# Patient Record
Sex: FEMALE | Race: ASIAN | Employment: UNEMPLOYED | URBAN - METROPOLITAN AREA
[De-identification: names, ages, dates, MRNs, and addresses within clinical notes are randomized per-mention and may not be internally consistent; named-entity substitution may affect disease eponyms.]

---

## 2024-07-29 ENCOUNTER — HOSPITAL ENCOUNTER (INPATIENT)
Facility: HOSPITAL | Age: 54
LOS: 15 days | Discharge: HOME | End: 2024-08-13
Attending: STUDENT IN AN ORGANIZED HEALTH CARE EDUCATION/TRAINING PROGRAM | Admitting: HOSPITALIST
Payer: MEDICAID

## 2024-07-29 ENCOUNTER — APPOINTMENT (OUTPATIENT)
Dept: GENERAL RADIOLOGY | Facility: HOSPITAL | Age: 54
End: 2024-07-29
Payer: MEDICAID

## 2024-07-29 DIAGNOSIS — I50.9 ACUTE CONGESTIVE HEART FAILURE, UNSPECIFIED HEART FAILURE TYPE (HCC): ICD-10-CM

## 2024-07-29 DIAGNOSIS — E05.91 THYROTOXICOSIS WITH THYROTOXIC CRISIS, UNSPECIFIED THYROTOXICOSIS TYPE: ICD-10-CM

## 2024-07-29 DIAGNOSIS — I42.9 CARDIOMYOPATHY, UNSPECIFIED TYPE (HCC): ICD-10-CM

## 2024-07-29 DIAGNOSIS — I48.91 ATRIAL FIBRILLATION WITH RAPID VENTRICULAR RESPONSE (HCC): Primary | ICD-10-CM

## 2024-07-29 DIAGNOSIS — D64.9 ANEMIA, UNSPECIFIED TYPE: ICD-10-CM

## 2024-07-29 LAB
ALBUMIN SERPL-MCNC: 3.2 G/DL (ref 3.2–4.8)
ALP LIVER SERPL-CCNC: 110 U/L
ALT SERPL-CCNC: <7 U/L
ANION GAP SERPL CALC-SCNC: 5 MMOL/L (ref 0–18)
APTT PPP: 33 SECONDS (ref 23–36)
AST SERPL-CCNC: 23 U/L (ref ?–34)
BASOPHILS # BLD AUTO: 0.05 X10(3) UL (ref 0–0.2)
BASOPHILS NFR BLD AUTO: 1 %
BILIRUB DIRECT SERPL-MCNC: 0.5 MG/DL (ref ?–0.3)
BILIRUB SERPL-MCNC: 0.9 MG/DL (ref 0.3–1.2)
BNP SERPL-MCNC: 426 PG/ML
BUN BLD-MCNC: 14 MG/DL (ref 9–23)
BUN/CREAT SERPL: 22.6 (ref 10–20)
CALCIUM BLD-MCNC: 8.5 MG/DL (ref 8.7–10.4)
CHLORIDE SERPL-SCNC: 110 MMOL/L (ref 98–112)
CO2 SERPL-SCNC: 26 MMOL/L (ref 21–32)
CORTIS SERPL-MCNC: 18.9 UG/DL
CREAT BLD-MCNC: 0.62 MG/DL
DEPRECATED RDW RBC AUTO: 51.9 FL (ref 35.1–46.3)
EGFRCR SERPLBLD CKD-EPI 2021: 105 ML/MIN/1.73M2 (ref 60–?)
EOSINOPHIL # BLD AUTO: 0.06 X10(3) UL (ref 0–0.7)
EOSINOPHIL NFR BLD AUTO: 1.2 %
ERYTHROCYTE [DISTWIDTH] IN BLOOD BY AUTOMATED COUNT: 19.5 % (ref 11–15)
GLUCOSE BLD-MCNC: 88 MG/DL (ref 70–99)
HCG SERPL QL: NEGATIVE
HCT VFR BLD AUTO: 25 %
HGB BLD-MCNC: 7.1 G/DL
IMM GRANULOCYTES # BLD AUTO: 0.01 X10(3) UL (ref 0–1)
IMM GRANULOCYTES NFR BLD: 0.2 %
INR BLD: 1.4 (ref 0.8–1.2)
IRON SATN MFR SERPL: 6 %
IRON SERPL-MCNC: 22 UG/DL
LYMPHOCYTES # BLD AUTO: 1.35 X10(3) UL (ref 1–4)
LYMPHOCYTES NFR BLD AUTO: 26.8 %
MAGNESIUM SERPL-MCNC: 1.8 MG/DL (ref 1.6–2.6)
MCH RBC QN AUTO: 20.9 PG (ref 26–34)
MCHC RBC AUTO-ENTMCNC: 28.4 G/DL (ref 31–37)
MCV RBC AUTO: 73.7 FL
MONOCYTES # BLD AUTO: 0.43 X10(3) UL (ref 0.1–1)
MONOCYTES NFR BLD AUTO: 8.5 %
NEUTROPHILS # BLD AUTO: 3.14 X10 (3) UL (ref 1.5–7.7)
NEUTROPHILS # BLD AUTO: 3.14 X10(3) UL (ref 1.5–7.7)
NEUTROPHILS NFR BLD AUTO: 62.3 %
OSMOLALITY SERPL CALC.SUM OF ELEC: 292 MOSM/KG (ref 275–295)
PLATELET # BLD AUTO: 264 10(3)UL (ref 150–450)
POTASSIUM SERPL-SCNC: 3.8 MMOL/L (ref 3.5–5.1)
PROT SERPL-MCNC: 8.2 G/DL (ref 5.7–8.2)
PROTHROMBIN TIME: 18 SECONDS (ref 11.6–14.8)
RBC # BLD AUTO: 3.39 X10(6)UL
SODIUM SERPL-SCNC: 141 MMOL/L (ref 136–145)
T3FREE SERPL-MCNC: 6.24 PG/ML (ref 2.4–4.2)
T4 FREE SERPL-MCNC: 3.1 NG/DL (ref 0.8–1.7)
TIBC SERPL-MCNC: 349 UG/DL (ref 250–425)
TRANSFERRIN SERPL-MCNC: 234 MG/DL (ref 250–380)
TROPONIN I SERPL HS-MCNC: 33 NG/L
TSI SER-ACNC: <0.008 MIU/ML (ref 0.55–4.78)
WBC # BLD AUTO: 5 X10(3) UL (ref 4–11)

## 2024-07-29 PROCEDURE — 99223 1ST HOSP IP/OBS HIGH 75: CPT | Performed by: HOSPITALIST

## 2024-07-29 PROCEDURE — 71045 X-RAY EXAM CHEST 1 VIEW: CPT | Performed by: STUDENT IN AN ORGANIZED HEALTH CARE EDUCATION/TRAINING PROGRAM

## 2024-07-29 PROCEDURE — 3E0333Z INTRODUCTION OF ANTI-INFLAMMATORY INTO PERIPHERAL VEIN, PERCUTANEOUS APPROACH: ICD-10-PCS | Performed by: STUDENT IN AN ORGANIZED HEALTH CARE EDUCATION/TRAINING PROGRAM

## 2024-07-29 RX ORDER — PROPYLTHIOURACIL 50 MG/1
50 TABLET ORAL ONCE
Status: COMPLETED | OUTPATIENT
Start: 2024-07-29 | End: 2024-07-29

## 2024-07-29 RX ORDER — PROCHLORPERAZINE EDISYLATE 5 MG/ML
5 INJECTION INTRAMUSCULAR; INTRAVENOUS EVERY 8 HOURS PRN
Status: DISCONTINUED | OUTPATIENT
Start: 2024-07-29 | End: 2024-08-13

## 2024-07-29 RX ORDER — HEPARIN SODIUM AND DEXTROSE 10000; 5 [USP'U]/100ML; G/100ML
INJECTION INTRAVENOUS CONTINUOUS
Status: DISCONTINUED | OUTPATIENT
Start: 2024-07-30 | End: 2024-08-05

## 2024-07-29 RX ORDER — HEPARIN SODIUM 1000 [USP'U]/ML
5000 INJECTION, SOLUTION INTRAVENOUS; SUBCUTANEOUS ONCE
Status: COMPLETED | OUTPATIENT
Start: 2024-07-29 | End: 2024-07-29

## 2024-07-29 RX ORDER — DEXAMETHASONE SODIUM PHOSPHATE 4 MG/ML
4 VIAL (ML) INJECTION ONCE
Status: COMPLETED | OUTPATIENT
Start: 2024-07-29 | End: 2024-07-29

## 2024-07-29 RX ORDER — ACETAMINOPHEN 500 MG
500 TABLET ORAL EVERY 4 HOURS PRN
Status: DISCONTINUED | OUTPATIENT
Start: 2024-07-29 | End: 2024-08-13

## 2024-07-29 RX ORDER — HEPARIN SODIUM AND DEXTROSE 10000; 5 [USP'U]/100ML; G/100ML
1000 INJECTION INTRAVENOUS ONCE
Status: COMPLETED | OUTPATIENT
Start: 2024-07-29 | End: 2024-07-30

## 2024-07-29 RX ORDER — FUROSEMIDE 10 MG/ML
40 INJECTION INTRAMUSCULAR; INTRAVENOUS
Status: DISCONTINUED | OUTPATIENT
Start: 2024-07-29 | End: 2024-08-01

## 2024-07-29 RX ORDER — ONDANSETRON 2 MG/ML
4 INJECTION INTRAMUSCULAR; INTRAVENOUS EVERY 6 HOURS PRN
Status: DISCONTINUED | OUTPATIENT
Start: 2024-07-29 | End: 2024-08-13

## 2024-07-29 RX ORDER — FUROSEMIDE 10 MG/ML
40 INJECTION INTRAMUSCULAR; INTRAVENOUS ONCE
Status: COMPLETED | OUTPATIENT
Start: 2024-07-29 | End: 2024-07-29

## 2024-07-29 RX ORDER — TEMAZEPAM 15 MG/1
15 CAPSULE ORAL NIGHTLY PRN
Status: DISCONTINUED | OUTPATIENT
Start: 2024-07-29 | End: 2024-08-13

## 2024-07-29 NOTE — ED INITIAL ASSESSMENT (HPI)
Shubham arrives via [facility] from  for new onset afib. Patient went there today due to 60-80lb weight gain in the last month. She states her legs are weeping as well.   Denies any medical hx, denies taking any medications.

## 2024-07-30 ENCOUNTER — APPOINTMENT (OUTPATIENT)
Dept: CV DIAGNOSTICS | Facility: HOSPITAL | Age: 54
End: 2024-07-30
Attending: HOSPITALIST
Payer: MEDICAID

## 2024-07-30 LAB
ANION GAP SERPL CALC-SCNC: 6 MMOL/L (ref 0–18)
APTT PPP: 198.8 SECONDS (ref 23–36)
APTT PPP: 46.7 SECONDS (ref 23–36)
APTT PPP: 54.1 SECONDS (ref 23–36)
BASOPHILS # BLD AUTO: 0.02 X10(3) UL (ref 0–0.2)
BASOPHILS NFR BLD AUTO: 0.7 %
BUN BLD-MCNC: 13 MG/DL (ref 9–23)
BUN/CREAT SERPL: 18.8 (ref 10–20)
CALCIUM BLD-MCNC: 8.2 MG/DL (ref 8.7–10.4)
CHLORIDE SERPL-SCNC: 110 MMOL/L (ref 98–112)
CO2 SERPL-SCNC: 23 MMOL/L (ref 21–32)
CREAT BLD-MCNC: 0.69 MG/DL
DEPRECATED RDW RBC AUTO: 53.1 FL (ref 35.1–46.3)
EGFRCR SERPLBLD CKD-EPI 2021: 102 ML/MIN/1.73M2 (ref 60–?)
EOSINOPHIL # BLD AUTO: 0 X10(3) UL (ref 0–0.7)
EOSINOPHIL NFR BLD AUTO: 0 %
ERYTHROCYTE [DISTWIDTH] IN BLOOD BY AUTOMATED COUNT: 19.6 % (ref 11–15)
GLUCOSE BLD-MCNC: 130 MG/DL (ref 70–99)
HCT VFR BLD AUTO: 25.1 %
HGB BLD-MCNC: 7.1 G/DL
IMM GRANULOCYTES # BLD AUTO: 0.01 X10(3) UL (ref 0–1)
IMM GRANULOCYTES NFR BLD: 0.3 %
IRON SATN MFR SERPL: 8 %
IRON SERPL-MCNC: 23 UG/DL
LYMPHOCYTES # BLD AUTO: 0.82 X10(3) UL (ref 1–4)
LYMPHOCYTES NFR BLD AUTO: 27.9 %
MAGNESIUM SERPL-MCNC: 1.7 MG/DL (ref 1.6–2.6)
MCH RBC QN AUTO: 21.1 PG (ref 26–34)
MCHC RBC AUTO-ENTMCNC: 28.3 G/DL (ref 31–37)
MCV RBC AUTO: 74.7 FL
MONOCYTES # BLD AUTO: 0.1 X10(3) UL (ref 0.1–1)
MONOCYTES NFR BLD AUTO: 3.4 %
NEUTROPHILS # BLD AUTO: 1.99 X10 (3) UL (ref 1.5–7.7)
NEUTROPHILS # BLD AUTO: 1.99 X10(3) UL (ref 1.5–7.7)
NEUTROPHILS NFR BLD AUTO: 67.7 %
OSMOLALITY SERPL CALC.SUM OF ELEC: 290 MOSM/KG (ref 275–295)
PLATELET # BLD AUTO: 261 10(3)UL (ref 150–450)
PLATELET MORPHOLOGY: NORMAL
POTASSIUM SERPL-SCNC: 4.2 MMOL/L (ref 3.5–5.1)
Q-T INTERVAL: 262 MS
QRS DURATION: 64 MS
QTC CALCULATION (BEZET): 398 MS
R AXIS: 88 DEGREES
RBC # BLD AUTO: 3.36 X10(6)UL
SODIUM SERPL-SCNC: 139 MMOL/L (ref 136–145)
T AXIS: -4 DEGREES
T4 FREE SERPL-MCNC: 2.5 NG/DL (ref 0.8–1.7)
TIBC SERPL-MCNC: 302 UG/DL (ref 250–425)
TRANSFERRIN SERPL-MCNC: 203 MG/DL (ref 250–380)
VENTRICULAR RATE: 139 BPM
WBC # BLD AUTO: 2.9 X10(3) UL (ref 4–11)

## 2024-07-30 PROCEDURE — 93306 TTE W/DOPPLER COMPLETE: CPT | Performed by: HOSPITALIST

## 2024-07-30 PROCEDURE — 99223 1ST HOSP IP/OBS HIGH 75: CPT | Performed by: INTERNAL MEDICINE

## 2024-07-30 PROCEDURE — 99233 SBSQ HOSP IP/OBS HIGH 50: CPT | Performed by: HOSPITALIST

## 2024-07-30 RX ORDER — PROPRANOLOL HYDROCHLORIDE 20 MG/1
20 TABLET ORAL 3 TIMES DAILY
Status: DISCONTINUED | OUTPATIENT
Start: 2024-07-30 | End: 2024-08-07

## 2024-07-30 RX ORDER — METHIMAZOLE 5 MG/1
20 TABLET ORAL 2 TIMES DAILY
Status: DISCONTINUED | OUTPATIENT
Start: 2024-07-30 | End: 2024-08-01

## 2024-07-30 RX ORDER — MAGNESIUM OXIDE 400 MG/1
400 TABLET ORAL ONCE
Status: COMPLETED | OUTPATIENT
Start: 2024-07-30 | End: 2024-07-30

## 2024-07-30 NOTE — CONSULTS
[facility]  part of Located within [facility]    Report of Consultation    Shubham Zurita Patient Status:  Inpatient    1970 MRN [id number]   Location [facility] 3W/SW Attending Ana Alcantara MD   Hosp Day # 1 PCP No primary care provider on file.     Date of Admission:  2024   DOS is the same date the note was signed   Consulted by Ana Alcantara MD  Reason for Consultation:    Hyperthyroid    History of Present Illness:   Patient is a 54 year old female who was admitted to the hospital for Atrial fibrillation with rapid ventricular response (HCC): Atrial fibrillation with rapid ventricular response, hyperthyroidism and heart failure.      Pt noticed eye changes yrs ago but got worse recently   She was told she might have thyroid dz   1 sister with hyperthyroid   1 sister with hypothyroid     No tremors or palpitations   Has leg swelling and wt gain   No compressive sx       Past Medical History  History reviewed. No pertinent past medical history.  Denied PMH     Past Surgical History  History reviewed. No pertinent surgical history.  CS  Family History  No family history on file.  1 sister with hyperthyroid   1 sister with hypothyroid   Social History  Social History     Socioeconomic History    Marital status:    Tobacco Use    Smoking status: Never    Smokeless tobacco: Never   Vaping Use    Vaping status: Never Used   Substance and Sexual Activity    Alcohol use: Never    Drug use: Never     Social Determinants of Health     Food Insecurity: No Food Insecurity (2024)    Food Insecurity     Food Insecurity: Never true   Transportation Needs: No Transportation Needs (2024)    Transportation Needs     Lack of Transportation: No   Housing Stability: Low Risk  (2024)    Housing Stability     Housing Instability: No           Current Medications:  Current Facility-Administered Medications   Medication Dose Route Frequency    methIMAzole (Tapazole) tab 20 mg  20 mg  Oral BID    heparin (Porcine) 91981 units/250mL infusion ACS/AFIB CONTINUOUS  200-3,000 Units/hr Intravenous Continuous    dilTIAZem 10 mg BOLUS FROM BAG infusion  10 mg Intravenous Q1H PRN    dilTIAZem (cardIZEM) 100 mg in sodium chloride 0.9% 100 mL IVPB-ADDV  2.5-20 mg/hr Intravenous Continuous    dilTIAZem (cardIZEM) tab 30 mg  30 mg Oral 4 times per day    furosemide (Lasix) 10 mg/mL injection 40 mg  40 mg Intravenous BID (Diuretic)    acetaminophen (Tylenol Extra Strength) tab 500 mg  500 mg Oral Q4H PRN    ondansetron (Zofran) 4 MG/2ML injection 4 mg  4 mg Intravenous Q6H PRN    prochlorperazine (Compazine) 10 MG/2ML injection 5 mg  5 mg Intravenous Q8H PRN    temazepam (Restoril) cap 15 mg  15 mg Oral Nightly PRN     No medications prior to admission.       Allergies  No Known Allergies                Review of Systems:   All other systems are negative other than HPI    Physical Exam:   Vital Signs:  Blood pressure 122/71, pulse 112, temperature 98.2 °F (36.8 °C), temperature source Oral, resp. rate 19, height 5' 4\" (1.626 m), weight 244 lb 1.6 oz (110.7 kg), SpO2 96%.   Graves orbitopathy   No goiter   LE edema     General: Awake and alert.    HENT: Eye: EOMI, normal lids, no discharge,     Neck: full range of motion  Neck/Thyroid: neck inspection: normal, No scar, No goiter   Lungs: No acute respiratory distress, non-labored respiration. Speaking full sentences  Abdomen:  Obese, nontender  MSK: Moves extremities spontaneously. full range of motion in all major joints  Neuro:speech is clear. Awake, alert, no aphasia, no facial asymmetry,   Psych: Orientated to time, place, person & situation,   Skin: Skin is dry, no obvious rashes or lesions      Results:     Laboratory Data:  Lab Results   Component Value Date    WBC 2.9 (L) 07/30/2024    HGB 7.1 (L) 07/30/2024    HCT 25.1 (L) 07/30/2024    .0 07/30/2024    CREATSERUM 0.69 07/30/2024    BUN 13 07/30/2024     07/30/2024    K 4.2 07/30/2024      07/30/2024    CO2 23.0 07/30/2024     (H) 07/30/2024    CA 8.2 (L) 07/30/2024    ALB 3.2 07/29/2024    ALKPHO 110 (H) 07/29/2024    TP 8.2 07/29/2024    AST 23 07/29/2024    ALT <7 (L) 07/29/2024    PTT 46.7 (H) 07/30/2024    INR 1.40 (H) 07/29/2024    PTP 18.0 (H) 07/29/2024    T4F 3.1 (H) 07/29/2024    TSH <0.008 (L) 07/29/2024    MG 1.7 07/30/2024         Imaging:  XR CHEST AP PORTABLE  (CPT=71045)    Result Date: 7/29/2024  CONCLUSION: Cardiomegaly.  Small left pleural effusion.    Dictated by (CST): Saúl Belle MD on 7/29/2024 at 5:54 PM     Finalized by (CST): Saúl Belle MD on 7/29/2024 at 5:56 PM              Impression:     Patient Active Problem List   Diagnosis    Atrial fibrillation with rapid ventricular response (HCC)    Thyrotoxicosis with thyrotoxic crisis, unspecified thyrotoxicosis type    Acute congestive heart failure, unspecified heart failure type (HCC)    Cardiomyopathy, unspecified type (HCC)    Anemia, unspecified type    Acute heart failure (HCC)       Severe hyperthyroid with A fib and RVR  Newly dx hyperthyroid with complications. Graves orbitopathy. Most likely graves. No goiter on exam. Strong family hx of thyroid autoimmune disease   D/w   ER Dr. ANC and LFTs are normal. Neg HCG. FT4 high with suppressed TSH  . Gave PTU and dexamethasone in the ER     Pending labs today   Pending TSI and TRAb   Will start methimazole 20 mg bid first dose now   Will follow       Thank you for allowing me to participate in the care of your patient.  D/w pt and  Rn   Scott Black MD  7/30/2024

## 2024-07-30 NOTE — DIETARY NOTE
NUTRITION EDUCATION NOTE     Received consult for heart failure nutrition education. Verbally reviewed basic cardiac diet restrictions. Provided with Heart Failure Nutrition Therapy NCM handout to reinforce. Receptive to instruction. Would benefit from outpt f/u. Expect fair to good compliance. RD contact information provided to ptAlvaro Shepard MS, BING, RDN, LDN  Clinical Dietitian  P: [phone]

## 2024-07-30 NOTE — PLAN OF CARE
No complaints overnight. Heparin and Cardizem drip running.   Problem: Patient Centered Care  Goal: Patient preferences are identified and integrated in the patient's plan of care  Description: Interventions:  - What would you like us to know as we care for you? From home with   - Provide timely, complete, and accurate information to patient/family  - Incorporate patient and family knowledge, values, beliefs, and cultural backgrounds into the planning and delivery of care  - Encourage patient/family to participate in care and decision-making at the level they choose  - Honor patient and family perspectives and choices  Outcome: Progressing     Problem: Patient/Family Goals  Goal: Patient/Family Long Term Goal  Description: Patient's Long Term Goal: go home    Interventions:  - follow md orders  - See additional Care Plan goals for specific interventions  Outcome: Progressing  Goal: Patient/Family Short Term Goal  Description: Patient's Short Term Goal: remain safe in hospital    Interventions:   - monitor on tele  - heparin gtt  - See additional Care Plan goals for specific interventions  Outcome: Progressing     Problem: CARDIOVASCULAR - ADULT  Goal: Maintains optimal cardiac output and hemodynamic stability  Description: INTERVENTIONS:  - Monitor vital signs, rhythm, and trends  - Monitor for bleeding, hypotension and signs of decreased cardiac output  - Evaluate effectiveness of vasoactive medications to optimize hemodynamic stability  - Monitor arterial and/or venous puncture sites for bleeding and/or hematoma  - Assess quality of pulses, skin color and temperature  - Assess for signs of decreased coronary artery perfusion - ex. Angina  - Evaluate fluid balance, assess for edema, trend weights  Outcome: Progressing  Goal: Absence of cardiac arrhythmias or at baseline  Description: INTERVENTIONS:  - Continuous cardiac monitoring, monitor vital signs, obtain 12 lead EKG if indicated  - Evaluate  effectiveness of antiarrhythmic and heart rate control medications as ordered  - Initiate emergency measures for life threatening arrhythmias  - Monitor electrolytes and administer replacement therapy as ordered  Outcome: Progressing

## 2024-07-30 NOTE — PLAN OF CARE
Patient alert and oriented. Ambulating with standby assist in room. Heparin gtt and cardizem gtt infusing. IV lasix continued. Started on PO thyroid medication for possible Grave's Disease. Resting in bed with fall precautions in place and call light in reach. Plan to discharge home when medically clear.     Problem: Patient Centered Care  Goal: Patient preferences are identified and integrated in the patient's plan of care  Description: Interventions:  - What would you like us to know as we care for you? From home with   - Provide timely, complete, and accurate information to patient/family  - Incorporate patient and family knowledge, values, beliefs, and cultural backgrounds into the planning and delivery of care  - Encourage patient/family to participate in care and decision-making at the level they choose  - Honor patient and family perspectives and choices  Outcome: Progressing     Problem: Patient/Family Goals  Goal: Patient/Family Long Term Goal  Description: Patient's Long Term Goal: go home    Interventions:  - follow md orders  - See additional Care Plan goals for specific interventions  Outcome: Progressing  Goal: Patient/Family Short Term Goal  Description: Patient's Short Term Goal: remain safe in hospital    Interventions:   - monitor on tele  - heparin gtt  - See additional Care Plan goals for specific interventions  Outcome: Progressing     Problem: CARDIOVASCULAR - ADULT  Goal: Maintains optimal cardiac output and hemodynamic stability  Description: INTERVENTIONS:  - Monitor vital signs, rhythm, and trends  - Monitor for bleeding, hypotension and signs of decreased cardiac output  - Evaluate effectiveness of vasoactive medications to optimize hemodynamic stability  - Monitor arterial and/or venous puncture sites for bleeding and/or hematoma  - Assess quality of pulses, skin color and temperature  - Assess for signs of decreased coronary artery perfusion - ex. Angina  - Evaluate fluid balance,  assess for edema, trend weights  Outcome: Progressing  Goal: Absence of cardiac arrhythmias or at baseline  Description: INTERVENTIONS:  - Continuous cardiac monitoring, monitor vital signs, obtain 12 lead EKG if indicated  - Evaluate effectiveness of antiarrhythmic and heart rate control medications as ordered  - Initiate emergency measures for life threatening arrhythmias  - Monitor electrolytes and administer replacement therapy as ordered  Outcome: Progressing

## 2024-07-30 NOTE — PAYOR COMM NOTE
--------------  ADMISSION REVIEW     Payor: [facility]  Subscriber #:  [id number]  Authorization Number: [id number]    Admit date: 7/29/24  Admit time:  9:42 PM       REVIEW DOCUMENTATION:     ED Provider Notes        ED Provider Notes signed by Victor M Nina MD at 7/29/2024  7:56 PM       Author: Victor M Nina MD Service: -- Author Type: Physician    Filed: 7/29/2024  7:56 PM Date of Service: 7/29/2024  5:42 PM Status: Signed    : Victor M Nina MD (Physician)             History     Chief Complaint   Patient presents with    Weight Gain    Arrythmia/Palpitations       HPI    54 year old female brought in by EMS from urgent care.  She states for about a month now she has had fatigue, exertional dyspnea, about 60 pounds weight gain, bilateral lower extremity swelling.  She does not take any medicines that does not routinely go to the doctor.  It was noted at urgent care patient was in A-fib with RVR and EMS was called.          History reviewed. No pertinent past medical history.    History reviewed. No pertinent surgical history.    Social History     Socioeconomic History    Marital status:    Tobacco Use    Smoking status: Never    Smokeless tobacco: Never   Vaping Use    Vaping status: Never Used   Substance and Sexual Activity    Alcohol use: Never    Drug use: Never                   Physical Exam     ED Triage Vitals [07/29/24 1731]   /78   Pulse (!) 155   Resp (!) 32   Temp 98.1 °F (36.7 °C)   Temp src Temporal   SpO2 100 %   O2 Device None (Room air)       Physical Exam  Constitutional:       Appearance: She is not ill-appearing.   Eyes:      Extraocular Movements: Extraocular movements intact.   Neck:      Comments: Jugular venous distention+  Cardiovascular:      Rate and Rhythm: Tachycardia present. Rhythm irregular.      Pulses: Normal pulses.   Pulmonary:      Effort: Pulmonary effort is normal. No respiratory distress.   Abdominal:      General: Abdomen  is flat. There is distension.      Tenderness: There is no abdominal tenderness.   Musculoskeletal:      Cervical back: Normal range of motion.      Comments: Bilateral lower extremity edema, venous stasis skin changes pulses are intact   Skin:     General: Skin is warm.   Neurological:      General: No focal deficit present.      Mental Status: She is alert.              ED Course     Labs Reviewed   BASIC METABOLIC PANEL (8) - Abnormal; Notable for the following components:       Result Value    BUN/CREA Ratio 22.6 (*)     Calcium, Total 8.5 (*)     All other components within normal limits   BNP (B TYPE NATRIURETIC PEPTIDE) - Abnormal; Notable for the following components:    Beta Natriuretic Peptide 426 (*)     All other components within normal limits   PROTHROMBIN TIME (PT) - Abnormal; Notable for the following components:    PT 18.0 (*)     INR 1.40 (*)     All other components within normal limits   HEPATIC FUNCTION PANEL (7) - Abnormal; Notable for the following components:    ALT <7 (*)     Alkaline Phosphatase 110 (*)     Bilirubin, Direct 0.5 (*)     All other components within normal limits   TSH W REFLEX TO FREE T4 - Abnormal; Notable for the following components:    TSH <0.008 (*)     All other components within normal limits   T4, FREE (S) - Abnormal; Notable for the following components:    Free T4 3.1 (*)     All other components within normal limits   FREE T3 (TRIIODOTHYRONINE) - Abnormal; Notable for the following components:    T3 Free 6.24 (*)     All other components within normal limits   CBC W/ DIFFERENTIAL - Abnormal; Notable for the following components:    RBC 3.39 (*)     HGB 7.1 (*)     HCT 25.0 (*)     MCV 73.7 (*)     MCH 20.9 (*)     MCHC 28.4 (*)     RDW-SD 51.9 (*)     RDW 19.5 (*)     All other components within normal limits   TROPONIN I HIGH SENSITIVITY - Normal   PTT, ACTIVATED - Normal   MAGNESIUM - Normal   HCG, BETA SUBUNIT, QUAL - Normal   CBC WITH DIFFERENTIAL WITH  PLATELET    Narrative:     The following orders were created for panel order CBC With Differential With Platelet.  Procedure                               Abnormality         Status                     ---------                               -----------         ------                     CBC W/ DIFFERENTIAL[[id number]]          Abnormal            Final result                 Please view results for these tests on the individual orders.   SCAN SLIDE   CORTISOL   RAINBOW DRAW LAVENDER   RAINBOW DRAW LIGHT GREEN   RAINBOW DRAW BLUE   RAINBOW DRAW GOLD     XR CHEST AP PORTABLE  (CPT=71045)    Result Date: 7/29/2024  CONCLUSION: Cardiomegaly.  Small left pleural effusion.    Dictated by (CST): Saúl Belle MD on 7/29/2024 at 5:54 PM     Finalized by (CST): Saúl Belle MD on 7/29/2024 at 5:56 PM               MDM     Vitals:    07/29/24 1845 07/29/24 1900 07/29/24 1915 07/29/24 1945   BP: 114/67 128/81 115/70 130/78   Pulse: 106 107 108 106   Resp: 25 19 19 15   Temp:       TempSrc:       SpO2: 100% 100% 100% 100%   Weight:       Height:           Patient's clinical picture is concerning for heart failure.  She appears fluid overloaded.  She is in atrial fibrillation with rapid ventricular response.  Initial blood pressure is reassuring.  She is mentating and perfusing appropriately.  Will plan to start patient on diltiazem bolus and drip, check for electrolyte abnormalities, ACS, thyroid dysf    ED Course as of 07/29/24 1956  ------------------------------------------------------------  Time: 07/29 1804  Comment: My interpretation of chest x-ray with significant cardiomegaly.  Radiologist noting small left pleural effusion.  ------------------------------------------------------------  Time: 07/29 1813  Value: Hemoglobin(!): 7.1  Comment: States has normal colored stools.  ------------------------------------------------------------  Time: 07/29 1817  Comment: Rate is responding well to Cardizem.  Blood pressure  remained stable.  She is mentating and perfusing appropriately.  Patient will be started on heparin drip, case endorsed to cardiologist.  ------------------------------------------------------------  Time: 07/29 1829  Comment: Undetectable TSH concerning for thyroid storm given her clinical picture.  ------------------------------------------------------------  Time: 07/29 1855  Comment: I consulted with endocrinology, will start patient on PTU 50 mg every 6 and dexamethasone 4 mg twice daily.  ------------------------------------------------------------  Time: 07/29 1943  Comment: Vitals remain stable.     Critical Care Note  44 minutes of my time was spent engaged in work directly related to patient care, exclusive of other procedures, to prevent further deterioration of patient's condition.  Addressed impending deterioration including: airway, respiratory, cardiovascular, metabolic  Interpretation of CXR, cardiac output, EKG, BP  Response to treatments and reassessment of patient  Speaking with consultants  Performed by self      Disposition and Plan     Clinical Impression:  1. Atrial fibrillation with rapid ventricular response (HCC)    2. Thyrotoxicosis with thyrotoxic crisis, unspecified thyrotoxicosis type    3. Acute congestive heart failure, unspecified heart failure type (HCC)    4. Cardiomyopathy, unspecified type (HCC)    5. Anemia, unspecified type        Disposition:  Admit    Follow-up:  No follow-up provider specified.    Medications Prescribed:  There are no discharge medications for this patient.      Hospital Problems       Present on Admission             ICD-10-CM Noted POA    * (Principal) Atrial fibrillation with rapid ventricular response (HCC) I48.91 7/29/2024 Unknown            Signed by Victor M Nina MD on 7/29/2024  7:56 PM         MEDICATIONS ADMINISTERED IN LAST 1 DAY:  heparin (Porcine) 1000 UNIT/ML injection - BOLUS IV 5,000 Units       Date Action Dose Route User    7/29/2024 2727  Given 5,000 Units Intravenous Ariane Hernández RN          heparin (Porcine) 26028 units/250mL infusion ACS/AFIB CONTINUOUS       Date Action Dose Route User    7/30/2024 0829 Hi-Risk Rate/Dose Change 900 Units/hr Intravenous Gina Tristan RN    7/30/2024 0207 Hi-Risk Rate/Dose Change 700 Units/hr Intravenous Amina Gallo RN          dexamethasone (Decadron) 4 MG/ML injection 4 mg       Date Action Dose Route User    7/29/2024 2050 Given 4 mg Intravenous Jeremias Sanchez RN          dilTIAZem (cardIZEM) 100 mg in sodium chloride 0.9% 100 mL IVPB-ADDV       Date Action Dose Route User    7/29/2024 2000 Rate/Dose Change 15 mg/hr Intravenous Jeremias Sanchez RN    7/29/2024 1807 Rate/Dose Change 15 mg/hr Intravenous Ariane Hernández RN    7/29/2024 1748 New Bag 10 mg/hr Intravenous Ariane Hernández RN          dilTIAZem (cardIZEM) 100 mg in sodium chloride 0.9% 100 mL IVPB-ADDV       Date Action Dose Route User    7/30/2024 1128 New Bag 5 mg/hr Intravenous Gina Tristan RN    7/30/2024 0159 Rate/Dose Change 5 mg/hr Intravenous Amina Gallo RN    7/30/2024 0008 New Bag 10 mg/hr Intravenous Amina Gallo RN    7/29/2024 2210 Started Other 15 mg/hr Intravenous Amina Gallo RN          dilTIAZem 10 mg BOLUS FROM BAG infusion       Date Action Dose Route User    7/29/2024 1748 Bolus from Bag 10 mg Intravenous Ariane Hernández RN          dilTIAZem (cardIZEM) tab 30 mg       Date Action Dose Route User    7/30/2024 1200 Given 30 mg Oral Gina Tristan RN    7/30/2024 0610 Given 30 mg Oral Amina Gallo RN    7/30/2024 0010 Given 30 mg Oral Amina Gallo RN          heparin (Porcine) 93276 units/250mL infusion ED INITIAL DOSE       Date Action Dose Route User    7/29/2024 1844 New Bag 1,000 Units/hr Intravenous Hernández, Ariane, RN          furosemide (Lasix) 10 mg/mL injection 40 mg       Date Action Dose Route User    7/29/2024 1841 Given 40 mg Intravenous Ariane Hernández, RN          furosemide  (Lasix) 10 mg/mL injection 40 mg       Date Action Dose Route User    7/30/2024 0824 Given 40 mg Intravenous Gina Tristan RN          magnesium oxide (Mag-Ox) tab 400 mg       Date Action Dose Route User    7/30/2024 0824 Given 400 mg Oral Gina Tristan RN          methIMAzole (Tapazole) tab 20 mg       Date Action Dose Route User    7/30/2024 0824 Given 20 mg Oral Gina Tristan RN          propylthiouracil (PTU) tab 50 mg       Date Action Dose Route User    7/29/2024 2216 Given 50 mg Oral Amina Gallo RN            Vitals (last day)       Date/Time Temp Pulse Resp BP SpO2 Weight O2 Device O2 Flow Rate (L/min) Who    07/30/24 1125 98 °F (36.7 °C) 99 18 103/58 96 % -- None (Room air) -- CS    07/30/24 0813 98.2 °F (36.8 °C) 112 19 122/71 96 % -- None (Room air) -- CS    07/30/24 0606 97.7 °F (36.5 °C) 105 20 106/64 96 % -- None (Room air) -- PG    07/30/24 0007 -- 99 -- 110/60 -- 244 lb 1.6 oz (110.7 kg) -- -- PG    07/29/24 2158 98 °F (36.7 °C) 109 20 111/65 98 % -- None (Room air) -- PG    07/29/24 2126 -- 97 -- -- -- -- -- -- MO    07/29/24 2115 -- 90 20 -- 98 % -- -- -- TG    07/29/24 2100 -- 88 22 131/72 100 % -- -- -- TG    07/29/24 2045 -- 92 18 122/80 100 % -- -- -- TG    07/29/24 1945 -- 106 15 130/78 100 % -- -- -- TG    07/29/24 1915 -- 108 19 115/70 100 % -- None (Room air) -- KS    07/29/24 1900 -- 107 19 128/81 100 % -- None (Room air) -- KS    07/29/24 1845 -- 106 25 114/67 100 % -- None (Room air) -- KS    07/29/24 1835 -- -- -- -- -- 242 lb 8.1 oz (110 kg) -- -- KS    07/29/24 1826 -- 125 20 133/81 98 % -- -- -- KS    07/29/24 1815 -- 116 18 126/77 97 % -- None (Room air) -- KS    07/29/24 1809 -- 112 20 138/91 99 % -- None (Room air) -- KS    07/29/24 1800 -- 115 22 135/86 100 % -- None (Room air) -- KS    07/29/24 1745 -- 149 27 136/87 100 % -- None (Room air) -- KS    07/29/24 1743 -- 153 26 140/80 100 % -- None (Room air) -- KS    07/29/24 1740 -- -- -- -- -- -- None (Room air) -- KS     07/29/24 1731 98.1 °F (36.7 °C) 155 32 152/78 100 % -- None (Room air) -- TC    07/29/24 1730 -- -- -- -- -- 240 lb (108.9 kg) -- -- TC       7/29 H&P    ADMISSION DATE:       07/29/2024     HISTORY AND PHYSICAL EXAMINATION     CHIEF COMPLAINT:  Atrial fibrillation with rapid ventricular response, hyperthyroidism and heart failure.     HISTORY OF PRESENT ILLNESS:  The patient is a 54-year-old [place] female who came into the emergency department for evaluation of progressive weight gain and dyspnea.  She does not usually see a physician and does not take any medications at home.  CBC was unremarkable, except for hemoglobin of 7.1 and MCV 73.7 suggestive of iron deficiency anemia.  Liver function test and chemistry were roughly unremarkable.  Troponin was negative.  Magnesium 1.8.  TSH was 0.008.  Free T4 still pending.  Noted to be in atrial fibrillation with rapid ventricular response, rate of 130.  B natriuretic peptide 426, and chest x-ray showed cardiomegaly with small left pleural effusion.  Patient was initiated on IV Cardizem, Lasix, heparin, and she will be admitted to the hospital for further management.       REVIEW OF SYSTEMS:  Patient describes progressive dyspnea on exertion, leg edema, anasarca and weight gain of almost 60 pounds in the last 2 to 3 months.  She does not feel palpitations.  She feels chills at times, and she had loose bowel movements which has been chronic.  She denies any chest pain.  Other 12-point review of systems is negative.       PHYSICAL EXAMINATION:    GENERAL:  Alert and oriented to time, place, and person.  Mild to moderate distress.  VITAL SIGNS:  Temperature 98.1, pulse 125, respiratory rate 20, blood pressure 133/81, pulse ox 98% on room air.    HEENT:  Atraumatic.  Oropharynx clear.  Dry mucous membranes.   NECK:  Supple.  Positive jugular venous distention.  Trachea midline.  Goiter noted on exam.  Thyromegaly.    LUNGS:  Diminished breathing sounds both lung  bases.  HEART:  Irregularly irregular rhythm.  S1 and S2 auscultated.  Tachycardiac with systolic murmur best heard at the apex.  ABDOMEN:  Soft, nondistended.  Obese.  EXTREMITIES:  Edema +3 both legs.  No clubbing or cyanosis.  NEUROLOGIC:  Motor and sensory intact.  Cranial nerves II through XII are intact.      ASSESSMENT:    1.       Atrial fibrillation with rapid ventricular response.  2.       Hyperthyroidism with possible thyrotoxic goiter.  3.       Iron deficiency anemia.  4.       Heart failure with possible underlying severe cardiomyopathy.     PLAN:  Patient will be admitted to telemetry floor.  IV Cardizem, Lasix, heparin.  Obtain 2D echocardiogram with Doppler.  Obtain cardiology and endocrinology consult.  Further recommendations to follow.     7/30 ENDOCRINOLOGY CONSULT NOTE  Reason for Consultation:    Hyperthyroid     History of Present Illness:   Patient is a 54 year old female who was admitted to the hospital for Atrial fibrillation with rapid ventricular response (HCC): Atrial fibrillation with rapid ventricular response, hyperthyroidism and heart failure.      Pt noticed eye changes yrs ago but got worse recently   She was told she might have thyroid dz   1 sister with hyperthyroid   1 sister with hypothyroid      No tremors or palpitations   Has leg swelling and wt gain   No compressive sx           Impression:          Patient Active Problem List   Diagnosis    Atrial fibrillation with rapid ventricular response (HCC)    Thyrotoxicosis with thyrotoxic crisis, unspecified thyrotoxicosis type    Acute congestive heart failure, unspecified heart failure type (HCC)    Cardiomyopathy, unspecified type (HCC)    Anemia, unspecified type    Acute heart failure (HCC)         Severe hyperthyroid with A fib and RVR  Newly dx hyperthyroid with complications. Graves orbitopathy. Most likely graves. No goiter on exam. Strong family hx of thyroid autoimmune disease   D/w   ER  ANC and LFTs are normal.  Neg HCG. FT4 high with suppressed TSH  . Gave PTU and dexamethasone in the ER      Pending labs today   Pending TSI and TRAb   Will start methimazole 20 mg bid first dose now   Will follow     7/30 CARDIOLOGY CONSULT NOTE    HPIAlvaro Zurita is a 54 year old female presenting with atrial fibrillation, shortness of breath, significant volume retention.  In the emergency room, patient noted to be anemic with hemoglobin 7, MCV 73 and severely hypothyroid with TSH 0.008.  Patient noted to be in rapid atrial fibrillation with heart rate in 130s, .  She was started on diltiazem infusion given diuretics as well as started on anticoagulation with heparin infusion and admitted for further evaluation.  Endocrinology's been consulted for opinion on management and workup of severe hypothyroidism.  She was given PTU and dexamethasone in the emergency room prior to admission.  Current workup is pending however she was started on methimazole 20 mg twice daily.  Currently she is on diltiazem infusion with good rate control.  Echocardiogram today shows EF 60 to 65% with no regional wall motion maladies, there is moderate MR         Assessment:    HFpEF  Severe hypothyroidism  Atrial fibrillation with RVR        Plan:  Patient started on methimazole per endocrinology.  Recommend transitioning off of diltiazem and switching to propranolol for hyperthyroidism related atrial fibrillation.  Echo  shows normal EF, moderate MR  Will recommend weaning up diltiazem infusion and starting propranolol short acting 20 mg 3 times daily  Agree with IV diuretics given significant volume overload.

## 2024-07-30 NOTE — PROGRESS NOTES
Progress Note     Shubham Zurita Patient Status:  Inpatient    1970 MRN [id number]   Location [facility] 3W/SW Attending Ana Alcantara MD   Hosp Day # 1 PCP No primary care provider on file.     Chief Complaint: lower ext edema    Subjective:   S: Patient admitted for weight gain and lower extremity edema she did no dyspnea she was also found to be in atrial fibrillation.  She states that she usually does not see physicians  Her biggest concern is her edema  Is mild shortness of breath  She notes that her weight has fluctuated  Bowel movements are loose      Review of Systems:   10 point ROS completed and was negative, except for pertinent positive and negatives stated in subjective.    Objective:   Vital signs:  Temp:  [97.7 °F (36.5 °C)-98.2 °F (36.8 °C)] 97.8 °F (36.6 °C)  Pulse:  [] 105  Resp:  [15-32] 19  BP: (103-152)/(55-91) 117/55  SpO2:  [96 %-100 %] 99 %    Wt Readings from Last 6 Encounters:   24 244 lb 1.6 oz (110.7 kg)         Physical Exam:    General: No acute distress. Alert ,      , morbidly obese  Respiratory: Clear to auscultation bilaterally. No wheezes. No rhonchi.  Cardiovascular: S1, S2.  Irregular and tachycardic   abdomen: Soft, nontender, nondistended.  Positive bowel sounds. No rebound or guarding.  Neurologic: No focal neurological deficits.   Musculoskeletal: Moves all extremities.  Extremities: + Edema bilaterally  Results:   Diagnostic Data:      Labs:    Labs Last 24 Hours:   BMP     CBC    Other     Na 139 Cl 110 BUN 13 Glu 130   Hb 7.1   PTT 54.1 Procal -   K 4.2 CO2 23.0 Cr 0.69   WBC 2.9 >< .0  INR 1.40 CRP -   Renal Lytes Endo    Hct 25.1   Trop - D dim -   eGFR - Ca 8.2 POC Gluc  -    LFT   pBNP - Lactic -   eGFR AA - PO4 - A1c -   AST 23 APk 110 Prot 8.2  LDL -     Mg 1.7 TSH <0.008   ALT <7 T jagdeep 0.9 Alb 3.2        COVID-19 Lab Results    COVID-19  No results found for: \"COVID19\"    Pro-Calcitonin  No results for input(s): \"PCT\" in the  last 168 hours.    Cardiac  No results for input(s): \"TROP\", \"PBNP\" in the last 168 hours.    Creatinine Kinase  No results for input(s): \"CK\" in the last 168 hours.    Inflammatory Markers  No results for input(s): \"CRP\", \"JORGE LUIS\", \"LDH\", \"DDIMER\" in the last 168 hours.    Imaging: Imaging data reviewed in Epic.    Medications:    methIMAzole  20 mg Oral BID    propranolol  20 mg Oral TID    dilTIAZem  30 mg Oral 4 times per day    furosemide  40 mg Intravenous BID (Diuretic)       Assessment & Plan:   ASSESSMENT / PLAN:      Atrial fibrillation with rapid ventricular response  -TSH low started on methimazole  -Started on IV diltiazem drip and now switching to propranolol once IV Dilt is weaned  -Ejection fraction is 60 to 65%  -iv hep gtt    HFpEF  -Ejection fraction is 60 to 65% cannot assess LV dysfunction due to heart rhythm   -Plan aggressive diuresis with IV Lasix 40 mg twice daily  -Strict I's and O's and daily weights     Severe hyperthyroidism  -Has Graves' disease as well  -F T4 high with suppressed TCH  -Was given PTU and dexamethasone in the ED  -Started methimazole 20 mg twice daily  -Endocrinology consultation appreciated    Iron deficiency anemia    Heart failure          Quality:  DVT Prophylaxis: hep iv  CODE status: full  [place]:    Central line: n/a  Dispo: further recs pending clinical course      Will the patient be referred to TCC on discharge?: yes  Estimated date of discharge: TBD  Discharge is dependent on: clinical improvement  At this point Ms. Zurita is expected to be discharge to: home    Plan of care discussed with patient and nursing    Outpatient records or previous hospital records reviewed.   Patient and/or patient's family given opportunity to ask questions and note understanding and agreeing with therapeutic plan as outlined     Coordinated care with providers and counseling re: treatment plan and workup    MDM: High complexity     ALICE DANIELS MD    Supplementary Documentation:

## 2024-07-30 NOTE — DISCHARGE INSTRUCTIONS
Going Home Instructions  In this section you will find the tools which will guide you through the first few days after you leave the hospital. Continued use of these tools will help you develop the skills necessary to keep your heart failure under control.     Home Care Instructions Following Heart Failure - the most important things to do every day include:   Weigh yourself and review the “Self-Check Plan” sheet every morning.   Call your cardiologist office if you are in the “Pay Attention-Use Caution” (yellow zone) or “Medical Alert-Warning!” (red zone) as outlined in the Self-Check Plan sheet.  Take your medicines as prescribed.  Limit your sodium (salt) intake.  Know when to call your cardiologist, primary doctor, or nurse.  Know when to seek emergency care.      Things for You to Remember:   1. See your doctor or healthcare provider as written on your discharge instructions.  It is important that you attend this appointment to make sure your symptoms are under control.     2. Your recommended sodium intake is 6202-3819 mg daily.    3.  Weigh yourself every day.    4. Some exercise and activity is important to help keep your heart functioning and strong. Unless instructed not to exercise, you may walk at a slow to moderate pace for 10-15 minutes 2-3 days per week to start. Pace your activity to prevent shortness of breath or fatigue. Stop exercising if you develop chest pain, lightheadedness, or significant shortness of breath.       Call Your Cardiologist If:   You gain 2-3 pounds in one day or 5 pounds in one week.  You have more difficulty breathing.  You are getting more tired with normal activity.  You are more short of breath lying down, or awaken at night short of breath.  You have swelling of your feet or legs.  You urinate less often during the day and more often at night.  You have cramps in your legs.  You have blurred vision or see yellowish-green halos around objects of lights.    Go to the  Emergency Room If:   You have pain or tightness in your chest  You are extremely short of breath  You are coughing up pink-frothy mucus  You are traveling and develop symptoms of worsening heart failure      ** Please follow up with your cardiologist or Advanced Practice Provider as written on your discharge instructions. If you are not provided with an appointment, let your nurse know so you can get an appointment**

## 2024-07-30 NOTE — H&P
[facility]    PATIENT'S NAME: MARLENE RICHARDSON   ATTENDING PHYSICIAN: Victor M Nina MD   PATIENT ACCOUNT#:   [id number]    LOCATION:  [place] [address]  MEDICAL RECORD #:   [id number]       YOB: 1970  ADMISSION DATE:       07/29/2024    HISTORY AND PHYSICAL EXAMINATION    CHIEF COMPLAINT:  Atrial fibrillation with rapid ventricular response, hyperthyroidism and heart failure.    HISTORY OF PRESENT ILLNESS:  The patient is a 54-year-old [place] female who came into the emergency department for evaluation of progressive weight gain and dyspnea.  She does not usually see a physician and does not take any medications at home.  CBC was unremarkable, except for hemoglobin of 7.1 and MCV 73.7 suggestive of iron deficiency anemia.  Liver function test and chemistry were roughly unremarkable.  Troponin was negative.  Magnesium 1.8.  TSH was 0.008.  Free T4 still pending.  Noted to be in atrial fibrillation with rapid ventricular response, rate of 130.  B natriuretic peptide 426, and chest x-ray showed cardiomegaly with small left pleural effusion.  Patient was initiated on IV Cardizem, Lasix, heparin, and she will be admitted to the hospital for further management.    PAST MEDICAL HISTORY:  None.    PAST SURGICAL HISTORY:  None.    MEDICATIONS:  Currently none.    ALLERGIES:  No known drug allergies.    FAMILY HISTORY:  Father had heart disease.    SOCIAL HISTORY:  No tobacco, alcohol, or drug use.  Lives with her family.     REVIEW OF SYSTEMS:  Patient describes progressive dyspnea on exertion, leg edema, anasarca and weight gain of almost 60 pounds in the last 2 to 3 months.  She does not feel palpitations.  She feels chills at times, and she had loose bowel movements which has been chronic.  She denies any chest pain.  Other 12-point review of systems is negative.      PHYSICAL EXAMINATION:    GENERAL:  Alert and oriented to time, place, and person.  Mild to moderate distress.  VITAL SIGNS:   Temperature 98.1, pulse 125, respiratory rate 20, blood pressure 133/81, pulse ox 98% on room air.    HEENT:  Atraumatic.  Oropharynx clear.  Dry mucous membranes.   NECK:  Supple.  Positive jugular venous distention.  Trachea midline.  Goiter noted on exam.  Thyromegaly.    LUNGS:  Diminished breathing sounds both lung bases.  HEART:  Irregularly irregular rhythm.  S1 and S2 auscultated.  Tachycardiac with systolic murmur best heard at the apex.  ABDOMEN:  Soft, nondistended.  Obese.  EXTREMITIES:  Edema +3 both legs.  No clubbing or cyanosis.  NEUROLOGIC:  Motor and sensory intact.  Cranial nerves II through XII are intact.     ASSESSMENT:    1.   Atrial fibrillation with rapid ventricular response.  2.   Hyperthyroidism with possible thyrotoxic goiter.  3.   Iron deficiency anemia.  4.   Heart failure with possible underlying severe cardiomyopathy.    PLAN:  Patient will be admitted to telemetry floor.  IV Cardizem, Lasix, heparin.  Obtain 2D echocardiogram with Doppler.  Obtain cardiology and endocrinology consult.  Further recommendations to follow.     Dictated By Cynthia Aguilar MD  d: 07/29/2024 18:37:30  t: 07/29/2024 19:54:04  Job [id number]  FB/    cc: Victor M Nina MD

## 2024-07-30 NOTE — ED QUICK NOTES
Orders for admission, patient is aware of plan and ready to go upstairs. Any questions, please call ED RN Ariane at extension [phone].     Patient Covid vaccination status: Fully vaccinated     COVID Test Ordered in ED: None    COVID Suspicion at Admission: N/A    Running Infusions:    dilTIAZem 15 mg/hr (07/29/24 1807)    [START ON 7/30/2024] continuous dose heparin          Mental Status/LOC at time of transport: a&ox4    Other pertinent information:   CIWA score: N/A   NIH score:  N/A

## 2024-07-30 NOTE — CONSULTS
Cardiology Consult Note    Shubham Zurita Patient Status:  Inpatient    1970 MRN [id number]   Location [facility] 3W/SW Attending Ana Alcantara MD   Hosp Day # 1 PCP No primary care provider on file.     HPI.  Shubham Zurita is a 54 year old female presenting with atrial fibrillation, shortness of breath, significant volume retention.  In the emergency room, patient noted to be anemic with hemoglobin 7, MCV 73 and severely hypothyroid with TSH 0.008.  Patient noted to be in rapid atrial fibrillation with heart rate in 130s, .  She was started on diltiazem infusion given diuretics as well as started on anticoagulation with heparin infusion and admitted for further evaluation.  Endocrinology's been consulted for opinion on management and workup of severe hypothyroidism.  She was given PTU and dexamethasone in the emergency room prior to admission.  Current workup is pending however she was started on methimazole 20 mg twice daily.  Currently she is on diltiazem infusion with good rate control.  Echocardiogram today shows EF 60 to 65% with no regional wall motion maladies, there is moderate MR      Prior cardiac workup  Echo   1. Left ventricle: The cavity size was normal. Wall thickness was mildly      increased. Systolic function was normal. The estimated ejection fraction      was 60-65%, by visual assessment. Wall motion is normal; there are no      regional wall motion abnormalities. Unable to assess LV diastolic      function due to heart rhythm.   2. Right ventricle: The cavity size was increased. Systolic function was      normal.   3. Ventricular septum: There was diastolic flattening.   4. Mitral valve: There was moderate regurgitation.   5. Left atrium: The atrium was mildly dilated.   6. Tricuspid valve: The annulus is dilated. The valve is structurally      normal. There leaflets were restricted. There was wide-open      regurgitation.   7. Right atrium: The atrium was markedly  dilated.   8. Pulmonary arteries: Systolic pressure was moderately increased, estimated      to be 57mm Hg.         --------------------------------------------------------------------------------------------------------------------------------  ROS 10 systems reviewed, pertinent findings above.  ROS    History:  History reviewed. No pertinent past medical history.  History reviewed. No pertinent surgical history.  No family history on file.   reports that she has never smoked. She has never used smokeless tobacco. She reports that she does not drink alcohol and does not use drugs.    Objective:   Temp: 98 °F (36.7 °C)  Pulse: 99  Resp: 18  BP: 103/58    Intake/Output:     Intake/Output Summary (Last 24 hours) at 7/30/2024 1601  Last data filed at 7/30/2024 0929  Gross per 24 hour   Intake 641.52 ml   Output --   Net 641.52 ml       Physical Exam:     General: Alert and oriented x 3, no acute distress  HEENT: Normocephalic, anicteric sclera, neck supple.  Neck: No JVD, carotids 2+, no bruits.  Cardiac: Irregularly irregular rate and rhythm. S1, S2 normal. No murmur, pericardial rub, S3.  Lungs: Clear without wheezes, rales, rhonchi or dullness.  Normal excursions and effort.  Abdomen: Soft, non-tender. BS-present.  Extremities: Without clubbing, cyanosis.  Peripheral pulses are 2+.  +3 lower extremity edema bilaterally  Neurologic: Non-focal  Skin: Warm and dry.       Assessment:    HFpEF  Severe hypothyroidism  Atrial fibrillation with RVR      Plan:  Patient started on methimazole per endocrinology.  Recommend transitioning off of diltiazem and switching to propranolol for hyperthyroidism related atrial fibrillation.  Echo  shows normal EF, moderate MR  Will recommend weaning up diltiazem infusion and starting propranolol short acting 20 mg 3 times daily  Agree with IV diuretics given significant volume overload.    Thank you for allowing me to take part in the care of Shubham Zurita. Please call with any questions  of concerns.        Level of care: C5    Dr. Garfield Padron,   July 30, 2024  4:01 PM

## 2024-07-30 NOTE — PROGRESS NOTES
Shubham Zurita Patient Status:  Emergency    1970 MRN [id number]   Location [facility] EMERGENCY DEPARTMENT Attending Victor M Nina MD   Hosp Day # 0 PCP No primary care provider on file.     Cardiology Nocturnal APN Note    Briefly: (Documentation from chart review)     Shubham Zurita is a 53 y/o who presented with shortness of breath, fatigue and weight gain over the last month and has a PMH/PSH of:     History reviewed. No pertinent past medical history.    Primary Cardiologist None    Vital Signs:       2024     7:15 PM 2024     7:45 PM   Vitals History   /70 130/78   Pulse 108 106   Resp 19 15   SpO2 100 % 100 %        Labs:   Lab Results   Component Value Date    WBC 5.0 2024    HGB 7.1 2024    HCT 25.0 2024    .0 2024    CREATSERUM 0.62 2024    BUN 14 2024     2024    K 3.8 2024     2024    CO2 26.0 2024    GLU 88 2024    CA 8.5 2024    ALB 3.2 2024    ALKPHO 110 2024    BILT 0.9 2024    TP 8.2 2024    AST 23 2024    ALT <7 2024    PTT 33.0 2024    INR 1.40 2024    T4F 3.1 2024    TSH <0.008 2024    MG 1.8 2024    TROPHS 33 2024       Diagnostics:   XR CHEST AP PORTABLE  (CPT=71045)    Result Date: 2024  PROCEDURE: XR CHEST AP PORTABLE  (CPT=71045) TIME: 5:46 p.m.   COMPARISON: None.  INDICATIONS: Afib heart rate, sudden weight gain, and bilateral leg swelling.  TECHNIQUE:   Single view.   FINDINGS:  CARDIAC/VASC: There is enlargement of the cardiac silhouette.  Unremarkable pulmonary vasculature.  MEDIAST/VASHTI:   No visible mass or adenopathy. LUNGS/PLEURA: No significant pulmonary parenchymal abnormalities.  Blunting of left costophrenic sulcus compatible small effusion.  BONES: Scattered mild degenerative endplate changes in the visualized thoracolumbar spine. OTHER: Negative.           CONCLUSION: Cardiomegaly.  Small left pleural effusion.    Dictated by (CST): Saúl Belle MD on 7/29/2024 at 5:54 PM     Finalized by (CST): Saúl Belle MD on 7/29/2024 at 5:56 PM            Allergies:  No Known Allergies    Medications:    dilTIAZem    dilTIAZem    ED initial dose heparin    [START ON 7/30/2024] continuous dose heparin    propylthiouracil    dexamethasone    Assessment:   AF RVR  - New onset of unclear duration  - On heparin and diltiazem gtt  - Potentially triggered by thyroid storm    Acute heart failure  - Likely secondary to new AF RFR, though cardiac silhouette is enlarged on x-ray   -   - Up 60 pounds  - Small pleural effusion on x-ray  - Agree with echo in am as ordered by hospitalist      Plan:    - Continue to monitor overnight  - Formal Cardiology consult to follow in AM.       JULIAN Batres  [facility]  7/29/2024  8:21 PM

## 2024-07-31 PROBLEM — E05.00 GRAVES' ORBITOPATHY: Status: ACTIVE | Noted: 2024-07-31

## 2024-07-31 PROBLEM — E05.00 GRAVES DISEASE: Status: ACTIVE | Noted: 2024-07-31

## 2024-07-31 LAB
ANION GAP SERPL CALC-SCNC: 6 MMOL/L (ref 0–18)
APTT PPP: 54 SECONDS (ref 23–36)
BASOPHILS # BLD AUTO: 0.06 X10(3) UL (ref 0–0.2)
BASOPHILS NFR BLD AUTO: 0.9 %
BUN BLD-MCNC: 17 MG/DL (ref 9–23)
BUN/CREAT SERPL: 17.5 (ref 10–20)
CALCIUM BLD-MCNC: 8.6 MG/DL (ref 8.7–10.4)
CHLORIDE SERPL-SCNC: 107 MMOL/L (ref 98–112)
CO2 SERPL-SCNC: 24 MMOL/L (ref 21–32)
CREAT BLD-MCNC: 0.97 MG/DL
DEPRECATED HBV CORE AB SER IA-ACNC: 19.5 NG/ML
DEPRECATED RDW RBC AUTO: 49.7 FL (ref 35.1–46.3)
EGFRCR SERPLBLD CKD-EPI 2021: 69 ML/MIN/1.73M2 (ref 60–?)
EOSINOPHIL # BLD AUTO: 0.05 X10(3) UL (ref 0–0.7)
EOSINOPHIL NFR BLD AUTO: 0.8 %
ERYTHROCYTE [DISTWIDTH] IN BLOOD BY AUTOMATED COUNT: 19 % (ref 11–15)
GLUCOSE BLD-MCNC: 115 MG/DL (ref 70–99)
HCT VFR BLD AUTO: 24.4 %
HGB BLD-MCNC: 7.2 G/DL
IMM GRANULOCYTES # BLD AUTO: 0.02 X10(3) UL (ref 0–1)
IMM GRANULOCYTES NFR BLD: 0.3 %
LYMPHOCYTES # BLD AUTO: 2.15 X10(3) UL (ref 1–4)
LYMPHOCYTES NFR BLD AUTO: 32.8 %
MAGNESIUM SERPL-MCNC: 1.8 MG/DL (ref 1.6–2.6)
MCH RBC QN AUTO: 21.4 PG (ref 26–34)
MCHC RBC AUTO-ENTMCNC: 29.5 G/DL (ref 31–37)
MCV RBC AUTO: 72.4 FL
MONOCYTES # BLD AUTO: 0.63 X10(3) UL (ref 0.1–1)
MONOCYTES NFR BLD AUTO: 9.6 %
NEUTROPHILS # BLD AUTO: 3.64 X10 (3) UL (ref 1.5–7.7)
NEUTROPHILS # BLD AUTO: 3.64 X10(3) UL (ref 1.5–7.7)
NEUTROPHILS NFR BLD AUTO: 55.6 %
OSMOLALITY SERPL CALC.SUM OF ELEC: 286 MOSM/KG (ref 275–295)
PLATELET # BLD AUTO: 320 10(3)UL (ref 150–450)
POTASSIUM SERPL-SCNC: 4.2 MMOL/L (ref 3.5–5.1)
RBC # BLD AUTO: 3.37 X10(6)UL
SODIUM SERPL-SCNC: 137 MMOL/L (ref 136–145)
T4 FREE SERPL-MCNC: 1.9 NG/DL (ref 0.8–1.7)
THY STIM IMMUNO: 1.85 IU/L
WBC # BLD AUTO: 6.6 X10(3) UL (ref 4–11)

## 2024-07-31 PROCEDURE — 99233 SBSQ HOSP IP/OBS HIGH 50: CPT | Performed by: HOSPITALIST

## 2024-07-31 PROCEDURE — 99233 SBSQ HOSP IP/OBS HIGH 50: CPT | Performed by: INTERNAL MEDICINE

## 2024-07-31 RX ORDER — MAGNESIUM OXIDE 400 MG/1
400 TABLET ORAL ONCE
Status: COMPLETED | OUTPATIENT
Start: 2024-07-31 | End: 2024-07-31

## 2024-07-31 NOTE — PROGRESS NOTES
[facility]  part of [facility]    Progress Note    Shubham Zurita Patient Status:  Inpatient    1970 MRN [id number]   Location [facility] 3W/SW Attending Los Suero MD   Hosp Day # 2 PCP No primary care provider on file.     Subjective:   Shubham Zurita is a(n) 54 year old female      Discussed with pt and her  at bedside   FT4 1.9 this AM , high TSI   Sister had thyroid surgery for large goiter     Objective:   Vital Signs:  Blood pressure 97/56, pulse 83, temperature 98 °F (36.7 °C), temperature source Oral, resp. rate 18, height 5' 4\" (1.626 m), weight 248 lb 12.8 oz (112.9 kg), SpO2 98%.                    General: Awake and alert.    HENT: Eye: EOMI,    Neck/Thyroid: neck inspection: normal   Lungs:  Speaking full sentences  MSK: Moves extremities spontaneously.    Neuro:speech is clear.   Skin: Skin is dry       Assessment and Plan:     Patient Active Problem List   Diagnosis    Atrial fibrillation with rapid ventricular response (HCC)    Thyrotoxicosis with thyrotoxic crisis, unspecified thyrotoxicosis type    Acute congestive heart failure, unspecified heart failure type (HCC)    Cardiomyopathy, unspecified type (HCC)    Anemia, unspecified type    Acute heart failure (HCC)    Graves disease    Graves' orbitopathy       Severe hyperthyroid with A fib and RVR  Newly dx hyperthyroid with complications. Graves orbitopathy. Graves high TSI  No goiter on exam. Strong family hx of thyroid autoimmune disease        Pending  TRAb   Methimazole 20 mg bid and will titrate   Will follow       Discussed with patient and  her dx, treatment options, SALGADO vs surgery vs meds. Discussed thyroid and pregnancy (if applicable), wt gain, eye disease, and SE of meds and SALGADO. Methimazole may cause significant bone marrow depression; the most severe manifestation is agranulocytosis. May also cause Hepatotoxicity (including acute liver failure) Symptoms suggestive of hepatic  dysfunction (eg, anorexia, pruritus, right upper quadrant pain) should prompt evaluation. If you have nausea, vomiting, abdominal pain, jaundice- yellow skin or eye color-, dark urine, light stool color, fever, sore throat or infection, call us or the PCP.  Antithyroid drugs  side effects, monitoring, and follow-up issues, specifically agranulocytosis, liver toxicity, anaphylaxis, rash, lupus like syndrome, metallic taste in the mouth, and joint aches. We discussed that patient should discontinue methimazole at the earliest sign of a fever, sore throat, or other infection, should call our office and have WBC count with differential done. The drug should be held until the result is available.             Results:      Latest Reference Range & Units 07/30/24 09:35   Thy Stim Immuno 0.00 - 0.55 IU/L 1.85 (H)   (H): Data is abnormally high   Latest Reference Range & Units 07/31/24 06:18   T4,Free (Direct) 0.8 - 1.7 ng/dL 1.9 (H)   (H): Data is abnormally high     Latest Reference Range & Units 07/31/24 06:18   Neutrophils Absolute 1.50 - 7.70 x10(3) uL 3.64     Lab Results   Component Value Date    WBC 6.6 07/31/2024    HGB 7.2 (L) 07/31/2024    HCT 24.4 (L) 07/31/2024    .0 07/31/2024    CREATSERUM 0.97 07/31/2024    BUN 17 07/31/2024     07/31/2024    K 4.2 07/31/2024     07/31/2024    CO2 24.0 07/31/2024     (H) 07/31/2024    CA 8.6 (L) 07/31/2024    ALB 3.2 07/29/2024    ALKPHO 110 (H) 07/29/2024    BILT 0.9 07/29/2024    TP 8.2 07/29/2024    AST 23 07/29/2024    ALT <7 (L) 07/29/2024    PTT 54.0 (H) 07/31/2024    INR 1.40 (H) 07/29/2024    T4F 1.9 (H) 07/31/2024    TSH <0.008 (L) 07/29/2024    MG 1.8 07/31/2024       XR CHEST AP PORTABLE  (CPT=71045)    Result Date: 7/29/2024  CONCLUSION: Cardiomegaly.  Small left pleural effusion.    Dictated by (CST): Saúl Belle MD on 7/29/2024 at 5:54 PM     Finalized by (CST): Saúl Belle MD on 7/29/2024 at 5:56 PM         EKG 12 Lead    Result  Date: 7/30/2024  Atrial fibrillation with rapid ventricular response Low voltage QRS, consider pulmonary disease, pericardial effusion, or normal variant Nonspecific T wave abnormality Abnormal ECG No previous ECGs found in [facility] Confirmed by ADAN GREENWOOD ([id number]) on 7/30/2024 7:21:15 AM           Scott Black MD  7/31/2024        DOS is the same date the note was signed

## 2024-07-31 NOTE — PLAN OF CARE
No complaints overnight. Cardizem drip stopped. Pt asymptomatically hypotensive, 250ml bolus given. Heparin drip running at 9.  Problem: Patient Centered Care  Goal: Patient preferences are identified and integrated in the patient's plan of care  Description: Interventions:  - What would you like us to know as we care for you? From home with   - Provide timely, complete, and accurate information to patient/family  - Incorporate patient and family knowledge, values, beliefs, and cultural backgrounds into the planning and delivery of care  - Encourage patient/family to participate in care and decision-making at the level they choose  - Honor patient and family perspectives and choices  Outcome: Progressing     Problem: Patient/Family Goals  Goal: Patient/Family Long Term Goal  Description: Patient's Long Term Goal: go home    Interventions:  - follow md orders  - See additional Care Plan goals for specific interventions  Outcome: Progressing  Goal: Patient/Family Short Term Goal  Description: Patient's Short Term Goal: remain safe in hospital    Interventions:   - monitor on tele  - heparin gtt  - See additional Care Plan goals for specific interventions  Outcome: Progressing     Problem: CARDIOVASCULAR - ADULT  Goal: Maintains optimal cardiac output and hemodynamic stability  Description: INTERVENTIONS:  - Monitor vital signs, rhythm, and trends  - Monitor for bleeding, hypotension and signs of decreased cardiac output  - Evaluate effectiveness of vasoactive medications to optimize hemodynamic stability  - Monitor arterial and/or venous puncture sites for bleeding and/or hematoma  - Assess quality of pulses, skin color and temperature  - Assess for signs of decreased coronary artery perfusion - ex. Angina  - Evaluate fluid balance, assess for edema, trend weights  Outcome: Progressing  Goal: Absence of cardiac arrhythmias or at baseline  Description: INTERVENTIONS:  - Continuous cardiac monitoring, monitor  vital signs, obtain 12 lead EKG if indicated  - Evaluate effectiveness of antiarrhythmic and heart rate control medications as ordered  - Initiate emergency measures for life threatening arrhythmias  - Monitor electrolytes and administer replacement therapy as ordered  Outcome: Progressing

## 2024-07-31 NOTE — PROGRESS NOTES
Notified by RN pt hypotensive with last BP 85/60. Cardizem gtt stopped. Scheduled dose of propanolol held. Will monitor for now. No change to plan of care at this time.       Addendum 2315:  RN stated pt still hypotensive. Last BP 86/50. 250 ml bolus 0.9 NS ordered.

## 2024-07-31 NOTE — PLAN OF CARE
Problem: Patient Centered Care  Goal: Patient preferences are identified and integrated in the patient's plan of care  Description: Interventions:  - What would you like us to know as we care for you? From home with   - Provide timely, complete, and accurate information to patient/family  - Incorporate patient and family knowledge, values, beliefs, and cultural backgrounds into the planning and delivery of care  - Encourage patient/family to participate in care and decision-making at the level they choose  - Honor patient and family perspectives and choices  Outcome: Progressing     Problem: Patient/Family Goals  Goal: Patient/Family Long Term Goal  Description: Patient's Long Term Goal: go home    Interventions:  - follow md orders  - See additional Care Plan goals for specific interventions  Outcome: Progressing  Goal: Patient/Family Short Term Goal  Description: Patient's Short Term Goal: remain safe in hospital    Interventions:   - monitor on tele  - heparin gtt  - See additional Care Plan goals for specific interventions  Outcome: Progressing     Problem: CARDIOVASCULAR - ADULT  Goal: Maintains optimal cardiac output and hemodynamic stability  Description: INTERVENTIONS:  - Monitor vital signs, rhythm, and trends  - Monitor for bleeding, hypotension and signs of decreased cardiac output  - Evaluate effectiveness of vasoactive medications to optimize hemodynamic stability  - Monitor arterial and/or venous puncture sites for bleeding and/or hematoma  - Assess quality of pulses, skin color and temperature  - Assess for signs of decreased coronary artery perfusion - ex. Angina  - Evaluate fluid balance, assess for edema, trend weights  Outcome: Progressing  Goal: Absence of cardiac arrhythmias or at baseline  Description: INTERVENTIONS:  - Continuous cardiac monitoring, monitor vital signs, obtain 12 lead EKG if indicated  - Evaluate effectiveness of antiarrhythmic and heart rate control medications as  ordered  - Initiate emergency measures for life threatening arrhythmias  - Monitor electrolytes and administer replacement therapy as ordered  Outcome: Progressing    Alert x 4. Heparin drip. No complaints of pain or shortness of breath. Bladder scanned patient; patient retaining 800 ml of urine; patient refusing to be straight cathed at this time, will be more agreeable this evening after she eats dinner per patient. Patient had 4.5 second pause, asymptomatic. Patient had 3 second pause, asymptomatic; on call cardiology apn notified. Said to notify her if patient has any more pauses.

## 2024-07-31 NOTE — PROGRESS NOTES
[facility]  part of [facility]    Progress Note    Shubham Zurita Patient Status:  Inpatient    1970 MRN [id number]   Location [facility] 3W/SW Attending Los Suero MD   Hosp Day # 2 PCP No primary care provider on file.       Subjective:     No CP.    Breathing improved.    Objective:   Blood pressure 108/51, pulse 83, temperature 98 °F (36.7 °C), temperature source Oral, resp. rate 18, height 5' 4\" (1.626 m), weight 243 lb 9.6 oz (110.5 kg), SpO2 98%.    Gen:   NAD.  A and O x 3  CV:    irreg irreg  Pulm:   CTA bilat  Abd:   +bs, soft, NT, ND  LE:   2-3+ lymphedema  Neuro:   nonfocal    Results:     Lab Results   Component Value Date    WBC 6.6 2024    HGB 7.2 (L) 2024    HCT 24.4 (L) 2024    .0 2024    CREATSERUM 0.97 2024    BUN 17 2024     2024    K 4.2 2024     2024    CO2 24.0 2024     (H) 2024    CA 8.6 (L) 2024    ALB 3.2 2024    ALKPHO 110 (H) 2024    BILT 0.9 2024    TP 8.2 2024    AST 23 2024    ALT <7 (L) 2024    PTT 54.0 (H) 2024    INR 1.40 (H) 2024    T4F 1.9 (H) 2024    TSH <0.008 (L) 2024    MG 1.8 2024       XR CHEST AP PORTABLE  (CPT=71045)    Result Date: 2024  CONCLUSION: Cardiomegaly.  Small left pleural effusion.    Dictated by (CST): Saúl Belle MD on 2024 at 5:54 PM     Finalized by (CST): Saúl Belle MD on 2024 at 5:56 PM         EKG 12 Lead    Result Date: 2024  Atrial fibrillation with rapid ventricular response Low voltage QRS, consider pulmonary disease, pericardial effusion, or normal variant Nonspecific T wave abnormality Abnormal ECG No previous ECGs found in [facility] Confirmed by ADAN GREENWOOD ([id number]) on 2024 7:21:15 AM     Assessment and Plan:     Atrial fibrillation with rapid ventricular response  Due to hyperthyroidism.  - TSH low, started on  methimazole  - cards on consult  - off dilt gtt  - cont po diltiazem, cont po propranolol  - heparin gtt  -Echo shows ejection fraction is 60 to 65%     HFpEF  - Ejection fraction is 60 to 65%, cannot assess LV dysfunction due to heart rhythm   - cont diuresis with IV Lasix 40 mg twice daily  - Strict I's and O's and daily weights   - cards on consult     Severe hyperthyroidism  Graves' disease    - F T4 high with suppressed TCH  - Was given PTU and dexamethasone in the ED  - cont methimazole 20 mg twice daily  - Endocrinology on consult     Microcytic anemia  - check ferritin, check haptoglobin  - check hemocult     dvt proph:    heparin gtt    Code status:    Full       MDM:    High Los Mason MD  7/31/2024

## 2024-07-31 NOTE — PROGRESS NOTES
Progress Note  Shubham Zurita Patient Status:  Inpatient    1970 MRN [id number]   Location [facility] 3W/SW Attending [place] MD   Hosp Day # 2 PCP No primary care provider on file.     Subjective:  Pt stated to have some SOB with activities. Stated last time she went to any doctor was 34years ago when she was pregnant, was healthy and didn't even have any cough all these years.     Objective:  /51 (BP Location: Left arm)   Pulse 83   Temp 97.8 °F (36.6 °C) (Oral)   Resp 18   Ht 5' 4\" (1.626 m)   Wt 248 lb 12.8 oz (112.9 kg)   SpO2 99%   BMI 42.71 kg/m²     Telemetry: afib, Hr 84      Intake/Output:    Intake/Output Summary (Last 24 hours) at 2024 0927  Last data filed at 2024 0607  Gross per 24 hour   Intake 924.75 ml   Output 750 ml   Net 174.75 ml       Last 3 Weights   24 0300 248 lb 12.8 oz (112.9 kg)   24 0007 244 lb 1.6 oz (110.7 kg)   24 1835 242 lb 8.1 oz (110 kg)   24 1730 240 lb (108.9 kg)       Labs:  Recent Labs   Lab 24  17324  0710 24  0618   GLU 88 130* 115*   BUN 14 13 17   CREATSERUM 0.62 0.69 0.97   EGFRCR 105 102 69   CA 8.5* 8.2* 8.6*    139 137   K 3.8 4.2 4.2    110 107   CO2 26.0 23.0 24.0     Recent Labs   Lab 24  17324  0710 24  0618   RBC 3.39* 3.36* 3.37*   HGB 7.1* 7.1* 7.2*   HCT 25.0* 25.1* 24.4*   MCV 73.7* 74.7* 72.4*   MCH 20.9* 21.1* 21.4*   MCHC 28.4* 28.3* 29.5*   RDW 19.5* 19.6* 19.0*   NEPRELIM 3.14 1.99 3.64   WBC 5.0 2.9* 6.6   .0 261.0 320.0         Recent Labs   Lab 24  1731   TROPHS 33     Lab Results   Component Value Date    INR 1.40 (H) 2024       Diagnostics:       Review of Systems   Respiratory:  Positive for shortness of breath.    Cardiovascular:  Positive for leg swelling.       Physical Exam:    Physical Exam  Vitals reviewed.   Constitutional:       General: She is not in acute distress.     Appearance: She is obese. She  is not ill-appearing.   Eyes:      Comments: Exophthalmos    Neck:      Vascular: JVD present.   Cardiovascular:      Rate and Rhythm: Normal rate. Rhythm irregularly irregular.      Pulses: Normal pulses.           Radial pulses are 2+ on the right side and 2+ on the left side.      Heart sounds: S1 normal and S2 normal. Murmur heard.      Systolic murmur is present with a grade of 3/6.      No friction rub. Gallop present. S3 sounds present. No S4 sounds.   Pulmonary:      Effort: Pulmonary effort is normal. No respiratory distress.      Breath sounds: No stridor. No wheezing, rhonchi or rales.   Abdominal:      Palpations: Abdomen is soft.      Comments: Rounded, striae present   Musculoskeletal:      Cervical back: Normal range of motion.      Right lower leg: 3+ Edema present.      Left lower leg: 3+ Edema present.      Comments: Edema up to abdomen   Neurological:      Mental Status: She is alert and oriented to person, place, and time.         Medications:   magnesium oxide  400 mg Oral Once    methIMAzole  20 mg Oral BID    propranolol  20 mg Oral TID    dilTIAZem  30 mg Oral 4 times per day    furosemide  40 mg Intravenous BID (Diuretic)      continuous dose heparin 900 Units/hr (07/30/24 2250)    dilTIAZem Stopped (07/30/24 2020)       Assessment/Plan:    Severe hyperthyroidism  - TSH of 0.008  - free T3, T4 elevated   - started on methlmazole and propanolol     HFpEF  - pt presented with SOB, significant volume retention  - BNP: 426  - chest xray: cardiomegaly, small left pleural effusion  - Echo with LVEF 60-65%, no RWMA, RV dilated, diastolic flattening of Ventricular septum, LA/RA dilated, moderate MR, There is wide open TR, PASP 57mmHg. IVC dilated,   - inaccurate documentation of output  -  on propanolol and lasix    pHTN  - on lasix    Iron deficiency anemia  - hgb of 7.2 today morning  - mgt per primary    Atrial fibrillation with RVR  - HR well controlled  - Cardizem gtt was stopped due to  hypotension  - propanolol on hold with low BP  - on heparin gtt for AC, if no invasive procedures planned and if Hgb stays stable will plan on transitioning to DOAC    Plan:  - Volume expaned on exam, continue diureis  - continue with propranolol   - Anemia work up per primary  - continue with heparin gtt per afib protocol now, if no invasive procedures planned, will switch to DOAC    Plan discussed with pt and RN     JULIAN Rodriguez  [facility]  7/31/2024  9:27 AM    Cardiologist Addendum:  Shubham Zurita was seen and examined independently and I agree with the above documentation provided by FAUSTO Saez.  Clinically, patient appears improved.  Echo with normal EF.  Transitioned off diltiazem infusion currently on short acting propranolol-can transition to extended release Inderal once blood pressure is more stable.  Hypothyroidism therapy per endocrinology.  Transition to DOAC if no invasive procedures are planned.    Thank you for allowing me to take part in the care of Shubham Zurita. Please call with any questions of concerns.    L3    Garfield Padron,   [facility]   Interventional Cardiac and Vascular Services      July 31, 2024  4:18 PM

## 2024-08-01 LAB
ANION GAP SERPL CALC-SCNC: 6 MMOL/L (ref 0–18)
APTT PPP: 33.8 SECONDS (ref 23–36)
APTT PPP: 67.1 SECONDS (ref 23–36)
APTT PPP: >240 SECONDS (ref 23–36)
BUN BLD-MCNC: 23 MG/DL (ref 9–23)
BUN/CREAT SERPL: 20.2 (ref 10–20)
CALCIUM BLD-MCNC: 8.1 MG/DL (ref 8.7–10.4)
CHLORIDE SERPL-SCNC: 107 MMOL/L (ref 98–112)
CO2 SERPL-SCNC: 24 MMOL/L (ref 21–32)
CREAT BLD-MCNC: 1.14 MG/DL
CREAT UR-SCNC: 14.8 MG/DL
DEPRECATED RDW RBC AUTO: 49.4 FL (ref 35.1–46.3)
EGFRCR SERPLBLD CKD-EPI 2021: 57 ML/MIN/1.73M2 (ref 60–?)
ERYTHROCYTE [DISTWIDTH] IN BLOOD BY AUTOMATED COUNT: 19 % (ref 11–15)
GLUCOSE BLD-MCNC: 100 MG/DL (ref 70–99)
HCT VFR BLD AUTO: 25.5 %
HGB BLD-MCNC: 7.7 G/DL
MAGNESIUM SERPL-MCNC: 1.8 MG/DL (ref 1.6–2.6)
MCH RBC QN AUTO: 21.8 PG (ref 26–34)
MCHC RBC AUTO-ENTMCNC: 30.2 G/DL (ref 31–37)
MCV RBC AUTO: 72.2 FL
OSMOLALITY SERPL CALC.SUM OF ELEC: 288 MOSM/KG (ref 275–295)
PLATELET # BLD AUTO: 348 10(3)UL (ref 150–450)
POTASSIUM SERPL-SCNC: 4.1 MMOL/L (ref 3.5–5.1)
RBC # BLD AUTO: 3.53 X10(6)UL
SODIUM SERPL-SCNC: 127 MMOL/L
SODIUM SERPL-SCNC: 137 MMOL/L (ref 136–145)
THYROTROPIN REC AB: 6.68 IU/L
UUN UR-MCNC: 121 MG/DL
WBC # BLD AUTO: 7.4 X10(3) UL (ref 4–11)

## 2024-08-01 PROCEDURE — 99232 SBSQ HOSP IP/OBS MODERATE 35: CPT | Performed by: INTERNAL MEDICINE

## 2024-08-01 PROCEDURE — 99233 SBSQ HOSP IP/OBS HIGH 50: CPT | Performed by: STUDENT IN AN ORGANIZED HEALTH CARE EDUCATION/TRAINING PROGRAM

## 2024-08-01 RX ORDER — METHIMAZOLE 5 MG/1
20 TABLET ORAL DAILY
Status: DISCONTINUED | OUTPATIENT
Start: 2024-08-01 | End: 2024-08-13

## 2024-08-01 RX ORDER — BUMETANIDE 0.25 MG/ML
2 INJECTION INTRAMUSCULAR; INTRAVENOUS
Status: DISCONTINUED | OUTPATIENT
Start: 2024-08-01 | End: 2024-08-01

## 2024-08-01 RX ORDER — MAGNESIUM OXIDE 400 MG/1
400 TABLET ORAL ONCE
Status: COMPLETED | OUTPATIENT
Start: 2024-08-01 | End: 2024-08-01

## 2024-08-01 RX ORDER — TAMSULOSIN HYDROCHLORIDE 0.4 MG/1
0.4 CAPSULE ORAL
Status: DISCONTINUED | OUTPATIENT
Start: 2024-08-01 | End: 2024-08-13

## 2024-08-01 RX ORDER — HEPARIN SODIUM 1000 [USP'U]/ML
3000 INJECTION, SOLUTION INTRAVENOUS; SUBCUTANEOUS ONCE
Status: COMPLETED | OUTPATIENT
Start: 2024-08-01 | End: 2024-08-01

## 2024-08-01 NOTE — PAYOR COMM NOTE
7/31 & 8/1  CONTINUED STAY REVIEW    Payor: [facility]  Subscriber #:  [id number]  Authorization Number: [id number]    Admit date: 7/29/24  Admit time:  9:42 PM         MEDICATIONS ADMINISTERED IN LAST 1 DAY:  heparin (Porcine) 57452 units/250mL infusion ACS/AFIB CONTINUOUS       Date Action Dose Route User    8/1/2024 0742 Hi-Risk Rate/Dose Change 600 Units/hr Intravenous Stephanie Jordan RN    7/31/2024 2140 New Bag 900 Units/hr Intravenous Nina Streeter RN          dilTIAZem (cardIZEM) tab 30 mg       Date Action Dose Route User    8/1/2024 1250 Given 30 mg Oral Stephanie Jordan RN    8/1/2024 0540 Given 30 mg Oral Nina Streeter RN    8/1/2024 0123 Given 30 mg Oral Nina Streeter RN    7/31/2024 1706 Given 30 mg Oral Stephanie Jordan RN          furosemide (Lasix) 10 mg/mL injection 40 mg       Date Action Dose Route User    8/1/2024 1251 Given 40 mg Intravenous Stephanie Jordan RN    7/31/2024 1706 Given 40 mg Intravenous Stephanie Jordan RN          magnesium oxide (Mag-Ox) tab 400 mg       Date Action Dose Route User    8/1/2024 1122 Given 400 mg Oral Stephaine Jordan RN          methIMAzole (Tapazole) tab 20 mg       Date Action Dose Route User    7/31/2024 2131 Given 20 mg Oral Nina Streeter RN          methIMAzole (Tapazole) tab 20 mg       Date Action Dose Route User    8/1/2024 1251 Given 20 mg Oral Setphanie Jordan RN          propranolol (Inderal) tab 20 mg       Date Action Dose Route User    7/31/2024 2131 Given 20 mg Oral Nina Streeter RN    7/31/2024 1706 Given 20 mg Oral Stephanie Jordan RN          tamsulosin (Flomax) cap 0.4 mg       Date Action Dose Route User    8/1/2024 1122 Given 0.4 mg Oral Stephanie Jordan, RN            Vitals (last day)       Date/Time Temp Pulse Resp BP SpO2 Weight O2 Device O2 Flow Rate (L/min) [facility]    08/01/24 1249 -- 75 20 100/57 100 % -- None (Room air) -- CC     08/01/24 1124 -- 80 18 103/60 98 % -- None (Room air) -- CC    08/01/24 0952 98.4 °F (36.9 °C) 77 16 99/55 97 % -- None (Room air) -- CC    08/01/24 0500 98.3 °F (36.8 °C) 74 14 97/54 99 % -- None (Room air) -- GO    08/01/24 0415 -- -- -- -- -- 248 lb 3.2 oz (112.6 kg) -- -- FS    07/31/24 2129 98 °F (36.7 °C) 86 15 107/63 100 % -- None (Room air) -- GO    07/31/24 1900 -- 66 -- -- -- -- -- -- MO    07/31/24 1830 -- 73 18 100/73 98 % -- None (Room air) --     07/31/24 1825 -- 72 -- -- -- -- -- -- RW    07/31/24 1819 -- 82 -- -- -- -- -- -- RW    07/31/24 1705 98.2 °F (36.8 °C) 85 18 111/58 98 % -- None (Room air) --     07/31/24 1116 -- 83 -- 108/51 -- -- -- --     07/31/24 1049 -- -- -- -- -- 243 lb 9.6 oz (110.5 kg) -- -- MV    07/31/24 1049 -- 86 -- 99/56 -- -- -- --     07/31/24 0929 98 °F (36.7 °C) 83 18 97/56 98 % -- None (Room air) -- CC    07/31/24 0608 97.8 °F (36.6 °C) 83 18 100/51 99 % -- None (Room air) -- PG    07/31/24 0300 -- -- -- -- -- 248 lb 12.8 oz (112.9 kg) -- -- BP    07/31/24 0027 -- -- -- 90/53 -- -- -- -- PG    07/31/24 0025 -- 76 -- 91/51 -- -- -- -- PG     7/31 CARDIOLOGY NOTE    Subjective:  Pt stated to have some SOB with activities. Stated last time she went to any doctor was 34years ago when she was pregnant, was healthy and didn't even have any cough all these years.      Objective:  /51 (BP Location: Left arm)   Pulse 83   Temp 97.8 °F (36.6 °C) (Oral)   Resp 18   Ht 5' 4\" (1.626 m)   Wt 248 lb 12.8 oz (112.9 kg)   SpO2 99%   BMI 42.71 kg/m²      Telemetry: afib, Hr 84        Intake/Output:     Intake/Output Summary (Last 24 hours) at 7/31/2024 0927  Last data filed at 7/31/2024 0607      Gross per 24 hour   Intake 924.75 ml   Output 750 ml   Net 174.75 ml          Labs:        Recent Labs   Lab 07/29/24  1731 07/30/24  0710 07/31/24  0618   GLU 88 130* 115*   BUN 14 13 17   CREATSERUM 0.62 0.69 0.97   EGFRCR 105 102 69   CA 8.5* 8.2* 8.6*    139 137   K  3.8 4.2 4.2    110 107   CO2 26.0 23.0 24.0            Recent Labs   Lab 07/29/24  1731 07/30/24  0710 07/31/24  0618   RBC 3.39* 3.36* 3.37*   HGB 7.1* 7.1* 7.2*   HCT 25.0* 25.1* 24.4*   MCV 73.7* 74.7* 72.4*   MCH 20.9* 21.1* 21.4*   MCHC 28.4* 28.3* 29.5*   RDW 19.5* 19.6* 19.0*   NEPRELIM 3.14 1.99 3.64   WBC 5.0 2.9* 6.6   .0 261.0 320.0         Physical Exam:    Physical Exam  Vitals reviewed.   Constitutional:       General: She is not in acute distress.     Appearance: She is obese. She is not ill-appearing.   Eyes:      Comments: Exophthalmos    Neck:      Vascular: JVD present.   Cardiovascular:      Rate and Rhythm: Normal rate. Rhythm irregularly irregular.      Pulses: Normal pulses.           Radial pulses are 2+ on the right side and 2+ on the left side.      Heart sounds: S1 normal and S2 normal. Murmur heard.      Systolic murmur is present with a grade of 3/6.      No friction rub. Gallop present. S3 sounds present. No S4 sounds.   Pulmonary:      Effort: Pulmonary effort is normal. No respiratory distress.      Breath sounds: No stridor. No wheezing, rhonchi or rales.   Abdominal:      Palpations: Abdomen is soft.      Comments: Rounded, striae present   Musculoskeletal:      Cervical back: Normal range of motion.      Right lower leg: 3+ Edema present.      Left lower leg: 3+ Edema present.      Comments: Edema up to abdomen   Neurological:      Mental Status: She is alert and oriented to person, place, and time.            Medications:  Scheduled Medications    magnesium oxide  400 mg Oral Once    methIMAzole  20 mg Oral BID    propranolol  20 mg Oral TID    dilTIAZem  30 mg Oral 4 times per day    furosemide  40 mg Intravenous BID (Diuretic)         Medication Infusions    continuous dose heparin 900 Units/hr (07/30/24 2250)    dilTIAZem Stopped (07/30/24 2020)          Assessment/Plan:     Severe hyperthyroidism  - TSH of 0.008  - free T3, T4 elevated   - started on methlmazole  and propanolol      HFpEF  - pt presented with SOB, significant volume retention  - BNP: 426  - chest xray: cardiomegaly, small left pleural effusion  - Echo with LVEF 60-65%, no RWMA, RV dilated, diastolic flattening of Ventricular septum, LA/RA dilated, moderate MR, There is wide open TR, PASP 57mmHg. IVC dilated,   - inaccurate documentation of output  -  on propanolol and lasix     pHTN  - on lasix     Iron deficiency anemia  - hgb of 7.2 today morning  - mgt per primary     Atrial fibrillation with RVR  - HR well controlled  - Cardizem gtt was stopped due to hypotension  - propanolol on hold with low BP  - on heparin gtt for AC, if no invasive procedures planned and if Hgb stays stable will plan on transitioning to DOAC     Plan:  - Volume expaned on exam, continue diureis  - continue with propranolol   - Anemia work up per primary  - continue with heparin gtt per afib protocol now, if no invasive procedures planned, will switch to DOAC     Plan discussed with pt and RN     7/31 ENDOCRINOLOGY NOTE    Discussed with pt and her  at bedside   FT4 1.9 this AM , high TSI   Sister had thyroid surgery for large goiter      Objective:   Vital Signs:  Blood pressure 97/56, pulse 83, temperature 98 °F (36.7 °C), temperature source Oral, resp. rate 18, height 5' 4\" (1.626 m), weight 248 lb 12.8 oz (112.9 kg), SpO2 98%.        Assessment and Plan:          Patient Active Problem List   Diagnosis    Atrial fibrillation with rapid ventricular response (HCC)    Thyrotoxicosis with thyrotoxic crisis, unspecified thyrotoxicosis type    Acute congestive heart failure, unspecified heart failure type (HCC)    Cardiomyopathy, unspecified type (HCC)    Anemia, unspecified type    Acute heart failure (HCC)    Graves disease    Graves' orbitopathy         Severe hyperthyroid with A fib and RVR  Newly dx hyperthyroid with complications. Graves orbitopathy. Graves high TSI  No goiter on exam. Strong family hx of thyroid  autoimmune disease         Pending  TRAb   Methimazole 20 mg bid and will titrate   Will follow     7/31 HOSPITALIST NOTE    Subjective:      No CP.    Breathing improved.     Objective:   Blood pressure 108/51, pulse 83, temperature 98 °F (36.7 °C), temperature source Oral, resp. rate 18, height 5' 4\" (1.626 m), weight 243 lb 9.6 oz (110.5 kg), SpO2 98%.     Gen:   NAD.  A and O x 3  CV:    irreg irreg  Pulm:   CTA bilat  Abd:   +bs, soft, NT, ND  LE:   2-3+ lymphedema  Neuro:   nonfocal      Assessment and Plan:      Atrial fibrillation with rapid ventricular response  Due to hyperthyroidism.  - TSH low, started on methimazole  - cards on consult  - off dilt gtt  - cont po diltiazem, cont po propranolol  - heparin gtt  -Echo shows ejection fraction is 60 to 65%     HFpEF  - Ejection fraction is 60 to 65%, cannot assess LV dysfunction due to heart rhythm   - cont diuresis with IV Lasix 40 mg twice daily  - Strict I's and O's and daily weights   - cards on consult     Severe hyperthyroidism  Graves' disease    - F T4 high with suppressed TCH  - Was given PTU and dexamethasone in the ED  - cont methimazole 20 mg twice daily  - Endocrinology on consult     Microcytic anemia  - check ferritin, check haptoglobin  - check hemocult     dvt proph:    heparin gtt     Code status:    Full        8/1 ENDOCRINOLOGY    Subjective:   Shubham Zurita is a(n) 54 year old female       D/w pt and  at bedside   Feels better today   On MMI 20 mg bid      Objective:   Vital Signs:  Blood pressure 97/54, pulse 74, temperature 98.3 °F (36.8 °C), temperature source Oral, resp. rate 14, height 5' 4\" (1.626 m), weight 248 lb 3.2 oz (112.6 kg), SpO2 99%.      Assessment and Plan:          Patient Active Problem List   Diagnosis    Atrial fibrillation with rapid ventricular response (HCC)    Thyrotoxicosis with thyrotoxic crisis, unspecified thyrotoxicosis type    Acute congestive heart failure, unspecified heart failure type (HCC)     Cardiomyopathy, unspecified type (HCC)    Anemia, unspecified type    Acute heart failure (HCC)    Graves disease    Graves' orbitopathy         Severe hyperthyroid with A fib and RVR  Newly dx hyperthyroid with complications. Graves orbitopathy. Graves high TSI, High  TRAb   No goiter on exam. Strong family hx of thyroid autoimmune disease         Methimazole 20 mg bid --> every day and will titrate   FT4 levels tomorrow   Will follow         Discussed with patient and  her dx, treatment options, SALGADO vs surgery vs meds. Discussed thyroid and pregnancy (if applicable), wt gain, eye disease, and SE of meds and SALGADO. Methimazole may cause significant bone marrow depression; the most severe manifestation is agranulocytosis. May also cause Hepatotoxicity (including acute liver failure) Symptoms suggestive of hepatic dysfunction (eg, anorexia, pruritus, right upper quadrant pain) should prompt evaluation. If you have nausea, vomiting, abdominal pain, jaundice- yellow skin or eye color-, dark urine, light stool color, fever, sore throat or infection, call us or the PCP.  Antithyroid drugs  side effects, monitoring, and follow-up issues, specifically agranulocytosis, liver toxicity, anaphylaxis, rash, lupus like syndrome, metallic taste in the mouth, and joint aches. We discussed that patient should discontinue methimazole at the earliest sign of a fever, sore throat, or other infection, should call our office and have WBC count with differential done. The drug should be held until the result is available.      Results:            Lab Results   Component Value Date     WBC 7.4 08/01/2024     HGB 7.7 (L) 08/01/2024     HCT 25.5 (L) 08/01/2024     .0 08/01/2024     CREATSERUM 1.14 (H) 08/01/2024     BUN 23 08/01/2024      08/01/2024     K 4.1 08/01/2024      08/01/2024     CO2 24.0 08/01/2024      (H) 08/01/2024     CA 8.1 (L) 08/01/2024

## 2024-08-01 NOTE — PROGRESS NOTES
Notified by RN pt had a 4 second pause and then a 3.5 second pause about 5 minutes later. Per RN, asymptomatic. Will monitor for now. No change to plan of care at this time. RN advised to repage if pauses continue.

## 2024-08-01 NOTE — PROGRESS NOTES
Progress Note     Shubham Zurita Patient Status:  Inpatient    1970 MRN [id number]   Location [facility] 3W/SW Attending Sara Leiva MD   Hosp Day # 3 PCP No primary care provider on file.     Subjective:   S: Patient seen at bedside, denied acute complaints. Only some discomfort when durbin inserted.     Review of Systems:   10 point ROS completed and was negative, except for pertinent positive and negatives stated in subjective.    Objective:   Vital signs:  Temp:  [98 °F (36.7 °C)-98.4 °F (36.9 °C)] 98.4 °F (36.9 °C)  Pulse:  [66-86] 75  Resp:  [14-20] 20  BP: ()/(54-73) 100/57  SpO2:  [97 %-100 %] 100 %    Wt Readings from Last 6 Encounters:   24 248 lb 3.2 oz (112.6 kg)         Physical Exam:    General: NAD, +Anasarca  Respiratory: Clear to auscultation bilaterally.  Cardiovascular: RRR  Abdomen: Soft, nontender, nondistended.  Positive bowel sounds. No rebound or guarding.  Neurologic: No focal neurological deficits.   Musculoskeletal: Moves all extremities.  Extremities: LE lymphadematous legs bilaterally     Results:   Diagnostic Data:      Labs:    Labs Last 24 Hours:   BMP     CBC    Other     Na 137 Cl 107 BUN 23 Glu 100   Hb 7.7   PTT 33.8 Procal -   K 4.1 CO2 24.0 Cr 1.14   WBC 7.4 >< .0  INR - CRP -   Renal Lytes Endo    Hct 25.5   Trop - D dim -   eGFR - Ca 8.1 POC Gluc  -    LFT   pBNP - Lactic -   eGFR AA - PO4 - A1c -   AST - APk - Prot -  LDL -     Mg 1.8 TSH -   ALT - T jagdeep - Alb -        COVID-19 Lab Results    COVID-19  No results found for: \"COVID19\"    Pro-Calcitonin  No results for input(s): \"PCT\" in the last 168 hours.    Cardiac  No results for input(s): \"TROP\", \"PBNP\" in the last 168 hours.    Creatinine Kinase  No results for input(s): \"CK\" in the last 168 hours.    Inflammatory Markers  Recent Labs   Lab 24  1241   JORGE LUIS 19.5*       Imaging: Imaging data reviewed in Epic.    Medications:    methIMAzole  20 mg Oral Daily    tamsulosin  0.4 mg  Oral Daily @ 0700    heparin  3,000 Units Intravenous Once    propranolol  20 mg Oral TID    dilTIAZem  30 mg Oral 4 times per day    furosemide  40 mg Intravenous BID (Diuretic)       Assessment & Plan:   ASSESSMENT / PLAN:     Atrial fibrillation with rapid ventricular response  Due to hyperthyroidism  - TSH low, started on methimazole  - cards on consult  - off dilt gtt  - cont po diltiazem, cont po propranolol  - heparin gtt  -Echo shows ejection fraction is 60 to 65%     HFpEF  - Ejection fraction is 60 to 65%, cannot assess LV dysfunction due to heart rhythm   - cont diuresis with IV Lasix 40 mg twice daily  - Strict I's and O's and daily weights -- showing net positive balance so far   - cards on consult     Severe hyperthyroidism  Graves' disease    - F T4 high with suppressed TCH  - strong family history of autoimmune thyroid disease   - Was given PTU and dexamethasone in the ED  - Methimazole 20 mg twice daily --> dose daily starting today   - FT4 levels tomorrow   - Endocrinology on consult     Microcytic anemia  - Iron labs reveal ACD  - cont to monitor H/H, transfuse for Hgb <7  - unclear if pt ever had preventative workup with cscope, pap, etc. Needs strict OP follow up.      Urinary Retention  - pt endorsing good Bms  - bladder scan showing >700-900 retention  - after durbin insertion still low UOP in durbin bag  - flomax added  - urology consulted   - pt on Lasix for CHF above, hold for now     DALTON  - Cr bumped this morning to 1.14, trending up from 0.6  - could be in setting of lasix vs retention vs other  - ulytes ordered  - cont to monitor     Deconditioning  - PT eval       dvt proph:    heparin gtt  Code status:    Full         MDM: High   I personally spent time on chart/note review, review of labs/imaging, discussion with patient, physical exam, discussion with staff, consultants, coordinating care, writing progress note, and discussion of plan of care.   Acute illness posing threat to life.  IV  medications requiring close inpatient monitoring       Sara Leiva MD    Supplementary Documentation:

## 2024-08-01 NOTE — PLAN OF CARE
Heparin drip continues. Straight cath done x1 for bladder scan 744 ml. Able to  walk to BR with supervision. Need stool OB, no Bm this shift. Will continue to monitor.    Problem: Patient Centered Care  Goal: Patient preferences are identified and integrated in the patient's plan of care  Description: Interventions:  - What would you like us to know as we care for you? From home with   - Provide timely, complete, and accurate information to patient/family  - Incorporate patient and family knowledge, values, beliefs, and cultural backgrounds into the planning and delivery of care  - Encourage patient/family to participate in care and decision-making at the level they choose  - Honor patient and family perspectives and choices  Outcome: Progressing     Problem: CARDIOVASCULAR - ADULT  Goal: Maintains optimal cardiac output and hemodynamic stability  Description: INTERVENTIONS:  - Monitor vital signs, rhythm, and trends  - Monitor for bleeding, hypotension and signs of decreased cardiac output  - Evaluate effectiveness of vasoactive medications to optimize hemodynamic stability  - Monitor arterial and/or venous puncture sites for bleeding and/or hematoma  - Assess quality of pulses, skin color and temperature  - Assess for signs of decreased coronary artery perfusion - ex. Angina  - Evaluate fluid balance, assess for edema, trend weights  Outcome: Progressing  Goal: Absence of cardiac arrhythmias or at baseline  Description: INTERVENTIONS:  - Continuous cardiac monitoring, monitor vital signs, obtain 12 lead EKG if indicated  - Evaluate effectiveness of antiarrhythmic and heart rate control medications as ordered  - Initiate emergency measures for life threatening arrhythmias  - Monitor electrolytes and administer replacement therapy as ordered  Outcome: Progressing     Problem: SAFETY ADULT - FALL  Goal: Free from fall injury  Description: INTERVENTIONS:  - Assess pt frequently for physical needs  - Identify  cognitive and physical deficits and behaviors that affect risk of falls.  - [place] fall precautions as indicated by assessment.  - Educate pt/family on patient safety including physical limitations  - Instruct pt to call for assistance with activity based on assessment  - Modify environment to reduce risk of injury  - Provide assistive devices as appropriate  - Consider OT/PT consult to assist with strengthening/mobility  - Encourage toileting schedule  Outcome: Progressing     Problem: DISCHARGE PLANNING  Goal: Discharge to home or other facility with appropriate resources  Description: INTERVENTIONS:  - Identify barriers to discharge w/pt and caregiver  - Include patient/family/discharge partner in discharge planning  - Arrange for needed discharge resources and transportation as appropriate  - Identify discharge learning needs (meds, wound care, etc)  - Arrange for interpreters to assist at discharge as needed  - Consider post-discharge preferences of patient/family/discharge partner  - Complete POLST form as appropriate  - Assess patient's ability to be responsible for managing their own health  - Refer to Case Management Department for coordinating discharge planning if the patient needs post-hospital services based on physician/LIP order or complex needs related to functional status, cognitive ability or social support system  Outcome: Progressing

## 2024-08-01 NOTE — PROGRESS NOTES
[facility] Cardiology Progress Note    Shubham Zurita Patient Status:  Inpatient    1970 MRN [id number]   Location [facility] 3W/SW Attending Sara Leiva MD   Hosp Day # 3 PCP No primary care provider on file.     Subjective:  No acute events overnight  Still sob, gross anasarca, feels minimal change from admission in edema    Objective:  /57 (BP Location: Right arm)   Pulse 75   Temp 98.4 °F (36.9 °C) (Oral)   Resp 20   Ht 5' 4\" (1.626 m)   Wt 248 lb 3.2 oz (112.6 kg)   SpO2 100%   BMI 42.60 kg/m²     Telemetry: afib      Intake/Output:    Intake/Output Summary (Last 24 hours) at 2024 1500  Last data filed at 2024 1100  Gross per 24 hour   Intake 544.95 ml   Output 800 ml   Net -255.05 ml       Last 3 Weights   24 0415 248 lb 3.2 oz (112.6 kg)   24 1049 243 lb 9.6 oz (110.5 kg)   24 0300 248 lb 12.8 oz (112.9 kg)   24 0007 244 lb 1.6 oz (110.7 kg)   24 1835 242 lb 8.1 oz (110 kg)   24 1730 240 lb (108.9 kg)       Labs:  Recent Labs   Lab 24  0710 24  0618 24  0420   * 115* 100*   BUN 13 17 23   CREATSERUM 0.69 0.97 1.14*   EGFRCR 102 69 57*   CA 8.2* 8.6* 8.1*    137 137   K 4.2 4.2 4.1    107 107   CO2 23.0 24.0 24.0     Recent Labs   Lab 24  1731 24  0710 24  0618 24  0420   RBC 3.39* 3.36* 3.37* 3.53*   HGB 7.1* 7.1* 7.2* 7.7*   HCT 25.0* 25.1* 24.4* 25.5*   MCV 73.7* 74.7* 72.4* 72.2*   MCH 20.9* 21.1* 21.4* 21.8*   MCHC 28.4* 28.3* 29.5* 30.2*   RDW 19.5* 19.6* 19.0* 19.0*   NEPRELIM 3.14 1.99 3.64  --    WBC 5.0 2.9* 6.6 7.4   .0 261.0 320.0 348.0         Recent Labs   Lab 24  1731   TROPHS 33       Diagnostics:             Physical Exam:    Physical Exam  Cardiovascular:      Rate and Rhythm: Normal rate. Rhythm irregular.      Heart sounds: Murmur heard.      Gallop present.   Pulmonary:      Comments: Difficulty taking deep breath, dim  BLL  Abdominal:      General: There is distension.      Comments: + abd wall edema   Musculoskeletal:      Right lower leg: Edema present.      Left lower leg: Edema present.   Neurological:      Mental Status: She is alert and oriented to person, place, and time.         Medications:   methIMAzole  20 mg Oral Daily    tamsulosin  0.4 mg Oral Daily @ 0700    heparin  3,000 Units Intravenous Once    propranolol  20 mg Oral TID    dilTIAZem  30 mg Oral 4 times per day    furosemide  40 mg Intravenous BID (Diuretic)      continuous dose heparin 800 Units/hr (08/01/24 1419)    dilTIAZem Stopped (07/30/24 2020)       Assessment:  55yo presenting with one month of SOB,fatigue, and 60lb weight gain. Found to be in Afib.     New onset Afib  Duration unclear   Rate controlled on Propranolol and diltiazem; had 4s pause in afib (not a conversion pause)k  [id number] (female, HF)   On heparin gtt   Acute HFpEF, with wide-open TR, mod MR  Clinically appears to have Right sided HF, perhaps related to pulmonary hypertensionn r/t thryotoxicosis   gross volume overload with minimal diuretic response   Thyrotoxicosis with crisis  New on Methimazole  CHOLO  Hgb 7.2    Plan:    Reduce diltiazem to 30mg TID, goal HR with Afib 80-100bpm, may also help avoid hypotension   Push IV diuresis, changes lasix to Bumex 2mg TID; if tolerates would consider diuretic gtt as output has been low    Meeta Rodriguez, APRN  8/1/2024  3:00 PM  Ph [phone] ([place])  Ph [phone] ([place])        L3  Seen and examined bedside. Agree with the present management, will follow with you.    Quite extensive edema on exam for response to Lasix was started on Bumex 3 times daily will switch to Bumex drip at 1 mg an hour.  Right-sided CHF.  Hemoglobin 7.2.  Currently on heparin drip will start anticoagulation tomorrow no procedures planned.  Thank you for allowing me to participate in the care of your patient, feel free to contact me if you have any  questions.    Palomo Clement MD  [facility] [place]  Interventional Cardiology.

## 2024-08-01 NOTE — PLAN OF CARE
Problem: Patient Centered Care  Goal: Patient preferences are identified and integrated in the patient's plan of care  Description: Interventions:  - What would you like us to know as we care for you? From home with   - Provide timely, complete, and accurate information to patient/family  - Incorporate patient and family knowledge, values, beliefs, and cultural backgrounds into the planning and delivery of care  - Encourage patient/family to participate in care and decision-making at the level they choose  - Honor patient and family perspectives and choices  Outcome: Progressing     Problem: Patient/Family Goals  Goal: Patient/Family Long Term Goal  Description: Patient's Long Term Goal: go home    Interventions:  - follow md orders  - See additional Care Plan goals for specific interventions  Outcome: Progressing  Goal: Patient/Family Short Term Goal  Description: Patient's Short Term Goal: remain safe in hospital    Interventions:   - monitor on tele  - heparin gtt  - See additional Care Plan goals for specific interventions  Outcome: Progressing     Problem: CARDIOVASCULAR - ADULT  Goal: Maintains optimal cardiac output and hemodynamic stability  Description: INTERVENTIONS:  - Monitor vital signs, rhythm, and trends  - Monitor for bleeding, hypotension and signs of decreased cardiac output  - Evaluate effectiveness of vasoactive medications to optimize hemodynamic stability  - Monitor arterial and/or venous puncture sites for bleeding and/or hematoma  - Assess quality of pulses, skin color and temperature  - Assess for signs of decreased coronary artery perfusion - ex. Angina  - Evaluate fluid balance, assess for edema, trend weights  Outcome: Progressing  Goal: Absence of cardiac arrhythmias or at baseline  Description: INTERVENTIONS:  - Continuous cardiac monitoring, monitor vital signs, obtain 12 lead EKG if indicated  - Evaluate effectiveness of antiarrhythmic and heart rate control medications as  ordered  - Initiate emergency measures for life threatening arrhythmias  - Monitor electrolytes and administer replacement therapy as ordered  Outcome: Progressing     Problem: SAFETY ADULT - FALL  Goal: Free from fall injury  Description: INTERVENTIONS:  - Assess pt frequently for physical needs  - Identify cognitive and physical deficits and behaviors that affect risk of falls.  - [place] fall precautions as indicated by assessment.  - Educate pt/family on patient safety including physical limitations  - Instruct pt to call for assistance with activity based on assessment  - Modify environment to reduce risk of injury  - Provide assistive devices as appropriate  - Consider OT/PT consult to assist with strengthening/mobility  - Encourage toileting schedule  Outcome: Progressing     Problem: DISCHARGE PLANNING  Goal: Discharge to home or other facility with appropriate resources  Description: INTERVENTIONS:  - Identify barriers to discharge w/pt and caregiver  - Include patient/family/discharge partner in discharge planning  - Arrange for needed discharge resources and transportation as appropriate  - Identify discharge learning needs (meds, wound care, etc)  - Arrange for interpreters to assist at discharge as needed  - Consider post-discharge preferences of patient/family/discharge partner  - Complete POLST form as appropriate  - Assess patient's ability to be responsible for managing their own health  - Refer to Case Management Department for coordinating discharge planning if the patient needs post-hospital services based on physician/LIP order or complex needs related to functional status, cognitive ability or social support system  Outcome: Progressing       On Heparin drip. Bumex drip started per MD orders. Venegas placed per MD orders. Strict intake and output. Plan; Bumex drip, Heparin drip, PT/OT evals.

## 2024-08-01 NOTE — PROGRESS NOTES
[facility]  part of [facility]    Progress Note    Shubham Zurita Patient Status:  Inpatient    1970 MRN [id number]   Location [facility] 3W/SW Attending Sara Leiva MD   Hosp Day # 3 PCP No primary care provider on file.     Subjective:   Shubham Zurita is a(n) 54 year old female      D/w pt and  at bedside   Feels better today   On MMI 20 mg bid     Objective:   Vital Signs:  Blood pressure 97/54, pulse 74, temperature 98.3 °F (36.8 °C), temperature source Oral, resp. rate 14, height 5' 4\" (1.626 m), weight 248 lb 3.2 oz (112.6 kg), SpO2 99%.                    General: Awake and alert.    HENT: Eye: EOMI,    Neck/Thyroid: neck inspection: normal   Lungs:  Speaking full sentences  MSK: Moves extremities spontaneously.    Neuro:speech is clear.   Skin: Skin is dry       Assessment and Plan:     Patient Active Problem List   Diagnosis    Atrial fibrillation with rapid ventricular response (HCC)    Thyrotoxicosis with thyrotoxic crisis, unspecified thyrotoxicosis type    Acute congestive heart failure, unspecified heart failure type (HCC)    Cardiomyopathy, unspecified type (HCC)    Anemia, unspecified type    Acute heart failure (HCC)    Graves disease    Graves' orbitopathy       Severe hyperthyroid with A fib and RVR  Newly dx hyperthyroid with complications. Graves orbitopathy. Graves high TSI, High  TRAb   No goiter on exam. Strong family hx of thyroid autoimmune disease         Methimazole 20 mg bid --> every day and will titrate   FT4 levels tomorrow   Will follow         Discussed with patient and  her dx, treatment options, SALGADO vs surgery vs meds. Discussed thyroid and pregnancy (if applicable), wt gain, eye disease, and SE of meds and SALGADO. Methimazole may cause significant bone marrow depression; the most severe manifestation is agranulocytosis. May also cause Hepatotoxicity (including acute liver failure) Symptoms suggestive of hepatic dysfunction (eg,  anorexia, pruritus, right upper quadrant pain) should prompt evaluation. If you have nausea, vomiting, abdominal pain, jaundice- yellow skin or eye color-, dark urine, light stool color, fever, sore throat or infection, call us or the PCP.  Antithyroid drugs  side effects, monitoring, and follow-up issues, specifically agranulocytosis, liver toxicity, anaphylaxis, rash, lupus like syndrome, metallic taste in the mouth, and joint aches. We discussed that patient should discontinue methimazole at the earliest sign of a fever, sore throat, or other infection, should call our office and have WBC count with differential done. The drug should be held until the result is available.     Results:     Lab Results   Component Value Date    WBC 7.4 08/01/2024    HGB 7.7 (L) 08/01/2024    HCT 25.5 (L) 08/01/2024    .0 08/01/2024    CREATSERUM 1.14 (H) 08/01/2024    BUN 23 08/01/2024     08/01/2024    K 4.1 08/01/2024     08/01/2024    CO2 24.0 08/01/2024     (H) 08/01/2024    CA 8.1 (L) 08/01/2024    ALB 3.2 07/29/2024    ALKPHO 110 (H) 07/29/2024    BILT 0.9 07/29/2024    TP 8.2 07/29/2024    AST 23 07/29/2024    ALT <7 (L) 07/29/2024    PTT >240.0 (HH) 08/01/2024    INR 1.40 (H) 07/29/2024    T4F 1.9 (H) 07/31/2024    TSH <0.008 (L) 07/29/2024    MG 1.8 08/01/2024       No results found.              Scott Black MD  8/1/2024        DOS is the same date the note was signed

## 2024-08-02 LAB
ANION GAP SERPL CALC-SCNC: 4 MMOL/L (ref 0–18)
ANION GAP SERPL CALC-SCNC: 5 MMOL/L (ref 0–18)
APTT PPP: 44.7 SECONDS (ref 23–36)
APTT PPP: 56.5 SECONDS (ref 23–36)
BASOPHILS # BLD AUTO: 0.05 X10(3) UL (ref 0–0.2)
BASOPHILS NFR BLD AUTO: 1 %
BUN BLD-MCNC: 24 MG/DL (ref 9–23)
BUN BLD-MCNC: 26 MG/DL (ref 9–23)
BUN/CREAT SERPL: 21.2 (ref 10–20)
BUN/CREAT SERPL: 22.6 (ref 10–20)
CALCIUM BLD-MCNC: 8.5 MG/DL (ref 8.7–10.4)
CALCIUM BLD-MCNC: 8.9 MG/DL (ref 8.7–10.4)
CHLORIDE SERPL-SCNC: 104 MMOL/L (ref 98–112)
CHLORIDE SERPL-SCNC: 104 MMOL/L (ref 98–112)
CO2 SERPL-SCNC: 27 MMOL/L (ref 21–32)
CO2 SERPL-SCNC: 28 MMOL/L (ref 21–32)
CREAT BLD-MCNC: 1.13 MG/DL
CREAT BLD-MCNC: 1.15 MG/DL
DEPRECATED RDW RBC AUTO: 49.7 FL (ref 35.1–46.3)
EGFRCR SERPLBLD CKD-EPI 2021: 56 ML/MIN/1.73M2 (ref 60–?)
EGFRCR SERPLBLD CKD-EPI 2021: 57 ML/MIN/1.73M2 (ref 60–?)
EOSINOPHIL # BLD AUTO: 0.12 X10(3) UL (ref 0–0.7)
EOSINOPHIL NFR BLD AUTO: 2.4 %
ERYTHROCYTE [DISTWIDTH] IN BLOOD BY AUTOMATED COUNT: 18.8 % (ref 11–15)
GLUCOSE BLD-MCNC: 90 MG/DL (ref 70–99)
GLUCOSE BLD-MCNC: 96 MG/DL (ref 70–99)
HAPTOGLOB SERPL-MCNC: 78 MG/DL (ref 30–200)
HCT VFR BLD AUTO: 25.6 %
HGB BLD-MCNC: 7.4 G/DL
IMM GRANULOCYTES # BLD AUTO: 0.01 X10(3) UL (ref 0–1)
IMM GRANULOCYTES NFR BLD: 0.2 %
LYMPHOCYTES # BLD AUTO: 1.57 X10(3) UL (ref 1–4)
LYMPHOCYTES NFR BLD AUTO: 30.8 %
MAGNESIUM SERPL-MCNC: 1.7 MG/DL (ref 1.6–2.6)
MCH RBC QN AUTO: 21.1 PG (ref 26–34)
MCHC RBC AUTO-ENTMCNC: 28.9 G/DL (ref 31–37)
MCV RBC AUTO: 73.1 FL
MONOCYTES # BLD AUTO: 0.47 X10(3) UL (ref 0.1–1)
MONOCYTES NFR BLD AUTO: 9.2 %
NEUTROPHILS # BLD AUTO: 2.88 X10 (3) UL (ref 1.5–7.7)
NEUTROPHILS # BLD AUTO: 2.88 X10(3) UL (ref 1.5–7.7)
NEUTROPHILS NFR BLD AUTO: 56.4 %
OSMOLALITY SERPL CALC.SUM OF ELEC: 284 MOSM/KG (ref 275–295)
OSMOLALITY SERPL CALC.SUM OF ELEC: 289 MOSM/KG (ref 275–295)
PLATELET # BLD AUTO: 302 10(3)UL (ref 150–450)
PLATELET MORPHOLOGY: NORMAL
POTASSIUM SERPL-SCNC: 3.8 MMOL/L (ref 3.5–5.1)
POTASSIUM SERPL-SCNC: 3.8 MMOL/L (ref 3.5–5.1)
RBC # BLD AUTO: 3.5 X10(6)UL
SODIUM SERPL-SCNC: 135 MMOL/L (ref 136–145)
SODIUM SERPL-SCNC: 137 MMOL/L (ref 136–145)
T4 FREE SERPL-MCNC: 1.3 NG/DL (ref 0.8–1.7)
WBC # BLD AUTO: 5.1 X10(3) UL (ref 4–11)

## 2024-08-02 PROCEDURE — 99232 SBSQ HOSP IP/OBS MODERATE 35: CPT | Performed by: INTERNAL MEDICINE

## 2024-08-02 PROCEDURE — 99233 SBSQ HOSP IP/OBS HIGH 50: CPT | Performed by: STUDENT IN AN ORGANIZED HEALTH CARE EDUCATION/TRAINING PROGRAM

## 2024-08-02 RX ORDER — SPIRONOLACTONE 25 MG/1
12.5 TABLET ORAL DAILY
Status: DISCONTINUED | OUTPATIENT
Start: 2024-08-02 | End: 2024-08-07

## 2024-08-02 RX ORDER — MAGNESIUM OXIDE 400 MG/1
400 TABLET ORAL ONCE
Status: COMPLETED | OUTPATIENT
Start: 2024-08-02 | End: 2024-08-02

## 2024-08-02 NOTE — CONSULTS
[facility]  [facility]  Consultation Note    Shubham Zurita Patient Status:  Inpatient    1970 MRN [id number]   Location [facility] 3W/SW Attending Sara Leiva MD   Hosp Day # 4 PCP No primary care provider on file.     Reason for Consultation:  Urinary retention     History of Present Illness:  Shubham Zurita is a a(n) 54 year old female admitted with severe hyperthyroid, a-fib, heart failure and sig weight gain (fluid overload). Patient is currently not very ambulatory and being treated with diuretics, requiring close output monitoring.  She denies prior episodes of urine retention, no frequency/urgency, no incontinence and no dysuria or hematuria.     History:  PAST MEDICAL HISTORY:  None.  PAST SURGICAL HISTORY:  None.  HOME MEDICATIONS:  Currently none.  FAMILY HISTORY:  Father had heart disease.  SOCIAL HISTORY:    reports that she has never smoked. She has never used smokeless tobacco. She reports that she does not drink alcohol and does not use drugs.    Allergies:  No Known Allergies    Medications:    Current Facility-Administered Medications:     methIMAzole (Tapazole) tab 20 mg, 20 mg, Oral, Daily    tamsulosin (Flomax) cap 0.4 mg, 0.4 mg, Oral, Daily @ 0700    dilTIAZem (cardIZEM) tab 30 mg, 30 mg, Oral, Q8H DC    bumetanide (Bumex) 12.5 mg in 50 mL infusion, 1 mg/hr, Intravenous, Continuous    propranolol (Inderal) tab 20 mg, 20 mg, Oral, TID    heparin (Porcine) 20415 units/250mL infusion ACS/AFIB CONTINUOUS, 200-3,000 Units/hr, Intravenous, Continuous    dilTIAZem 10 mg BOLUS FROM BAG infusion, 10 mg, Intravenous, Q1H PRN    acetaminophen (Tylenol Extra Strength) tab 500 mg, 500 mg, Oral, Q4H PRN    ondansetron (Zofran) 4 MG/2ML injection 4 mg, 4 mg, Intravenous, Q6H PRN    prochlorperazine (Compazine) 10 MG/2ML injection 5 mg, 5 mg, Intravenous, Q8H PRN    temazepam (Restoril) cap 15 mg, 15 mg, Oral, Nightly PRN    Review of Systems:  Pertinent items are noted in  HPI.    Physical Exam:  /66 (BP Location: Right arm)   Pulse 87   Temp 97.4 °F (36.3 °C) (Axillary)   Resp 14   Ht 5' 4\" (1.626 m)   Wt 241 lb 4.8 oz (109.5 kg)   SpO2 100%   BMI 41.42 kg/m²   GENERAL: well developed, well nourished, no apparent distress  EYES: sclera non-icteric, no redness   LUNGS: normal respiratory motion without distress  GI: Abdomen soft, no tenderness  : durbin in place, urine clear   NEURO: Alert and Oriented, motor and sensory grossly non-focal   LYMPH:  No appreciable axillary, neck, or inguinal  Adenopathy  SKIN: No rashes, no discoloration  EXTREMITIES: + edema, no calf pain    Impression:   Urine retention - likely multifactorial, ie non-ambulatory state, generalized weakness, possibly hypotonic bladder, current thyroid issues    A-fib, Thyrotoxicosis, CHF     Recommendations:  Recommend keep durbin for bladder rest (until medically stabilized)   Patient initiated on oral tamsulosin per primary service - benefit of such med in female uncertain   Await void trial until patient regains strength and more ambulatory   May require further workup as out patient if fails void trial prior to discharge     Thank you for allowing me to participate in the care of your patient.    Liberty Fuentes MD  [facility]  8/2/2024

## 2024-08-02 NOTE — PLAN OF CARE
Patient is alert and oriented. Patient is on an IV drip of Bumex and Heparin. All safety measures are in place and call light is within reach.    Problem: Patient Centered Care  Goal: Patient preferences are identified and integrated in the patient's plan of care  Description: Interventions:  - What would you like us to know as we care for you? From home with   - Provide timely, complete, and accurate information to patient/family  - Incorporate patient and family knowledge, values, beliefs, and cultural backgrounds into the planning and delivery of care  - Encourage patient/family to participate in care and decision-making at the level they choose  - Honor patient and family perspectives and choices  Outcome: Progressing     Problem: Patient/Family Goals  Goal: Patient/Family Long Term Goal  Description: Patient's Long Term Goal: go home    Interventions:  - follow md orders  - See additional Care Plan goals for specific interventions  Outcome: Progressing  Goal: Patient/Family Short Term Goal  Description: Patient's Short Term Goal: remain safe in hospital    Interventions:   - monitor on tele  - heparin gtt  - See additional Care Plan goals for specific interventions  Outcome: Progressing     Problem: CARDIOVASCULAR - ADULT  Goal: Maintains optimal cardiac output and hemodynamic stability  Description: INTERVENTIONS:  - Monitor vital signs, rhythm, and trends  - Monitor for bleeding, hypotension and signs of decreased cardiac output  - Evaluate effectiveness of vasoactive medications to optimize hemodynamic stability  - Monitor arterial and/or venous puncture sites for bleeding and/or hematoma  - Assess quality of pulses, skin color and temperature  - Assess for signs of decreased coronary artery perfusion - ex. Angina  - Evaluate fluid balance, assess for edema, trend weights  Outcome: Progressing  Goal: Absence of cardiac arrhythmias or at baseline  Description: INTERVENTIONS:  - Continuous cardiac  monitoring, monitor vital signs, obtain 12 lead EKG if indicated  - Evaluate effectiveness of antiarrhythmic and heart rate control medications as ordered  - Initiate emergency measures for life threatening arrhythmias  - Monitor electrolytes and administer replacement therapy as ordered  Outcome: Progressing     Problem: SAFETY ADULT - FALL  Goal: Free from fall injury  Description: INTERVENTIONS:  - Assess pt frequently for physical needs  - Identify cognitive and physical deficits and behaviors that affect risk of falls.  - [place] fall precautions as indicated by assessment.  - Educate pt/family on patient safety including physical limitations  - Instruct pt to call for assistance with activity based on assessment  - Modify environment to reduce risk of injury  - Provide assistive devices as appropriate  - Consider OT/PT consult to assist with strengthening/mobility  - Encourage toileting schedule  Outcome: Progressing     Problem: DISCHARGE PLANNING  Goal: Discharge to home or other facility with appropriate resources  Description: INTERVENTIONS:  - Identify barriers to discharge w/pt and caregiver  - Include patient/family/discharge partner in discharge planning  - Arrange for needed discharge resources and transportation as appropriate  - Identify discharge learning needs (meds, wound care, etc)  - Arrange for interpreters to assist at discharge as needed  - Consider post-discharge preferences of patient/family/discharge partner  - Complete POLST form as appropriate  - Assess patient's ability to be responsible for managing their own health  - Refer to Case Management Department for coordinating discharge planning if the patient needs post-hospital services based on physician/LIP order or complex needs related to functional status, cognitive ability or social support system  Outcome: Progressing

## 2024-08-02 NOTE — PAYOR COMM NOTE
8/2  CONTINUED STAY REVIEW    Payor: [facility]  Subscriber #:  [id number]  Authorization Number: [id number]    Admit date: 7/29/24  Admit time:  9:42 PM      MEDICATIONS ADMINISTERED IN LAST 1 DAY:  bumetanide (Bumex) 0.25 MG/ML injection 2 mg       Date Action Dose Route User    8/1/2024 1621 Given 2 mg Intravenous Stephanie Jordan RN          bumetanide (Bumex) 12.5 mg in 50 mL infusion       Date Action Dose Route User    8/2/2024 0211 New Bag 1 mg/hr Intravenous Nina Streeter RN    8/1/2024 1749 New Bag 1 mg/hr Intravenous Stephanie Jordan RN          heparin (Porcine) 98775 units/250mL infusion ACS/AFIB CONTINUOUS       Date Action Dose Route User    8/2/2024 0941 New Bag 1,000 Units/hr Intravenous Nishi Orellana RN    8/2/2024 0746 Hi-Risk Rate/Dose Change 1,000 Units/hr Intravenous Nishi Orellana RN    8/1/2024 1419 New Bag 800 Units/hr Intravenous Stephanie Jordan RN          dilTIAZem (cardIZEM) tab 30 mg       Date Action Dose Route User    8/2/2024 0550 Given 30 mg Oral Nina Streeter RN    8/1/2024 2214 Given 30 mg Oral BrandistNina odom RN          heparin (Porcine) 1000 UNIT/ML injection 3,000 Units       Date Action Dose Route User    8/1/2024 1621 Given 3,000 Units Intravenous Stephanie Jordan RN          magnesium oxide (Mag-Ox) tab 400 mg       Date Action Dose Route User    8/2/2024 0943 Given 400 mg Oral Nishi Orellana RN          methIMAzole (Tapazole) tab 20 mg       Date Action Dose Route User    8/2/2024 0943 Given 20 mg Oral Nishi Orellana RN          propranolol (Inderal) tab 20 mg       Date Action Dose Route User    8/2/2024 0943 Given 20 mg Oral Nishi Orellana RN    8/1/2024 2213 Given 20 mg Oral OrtbeatastNina odom RN    8/1/2024 1621 Given 20 mg Oral Stephanie Jordan RN          spironolactone (Aldactone) partial tab 12.5 mg       Date Action Dose Route User    8/2/2024 0942 Given 12.5 mg Oral Nishi Orellana RN           tamsulosin (Flomax) cap 0.4 mg       Date Action Dose Route User    8/2/2024 0550 Given 0.4 mg Oral Nina Streeter RN            Vitals (last day)       Date/Time Temp Pulse Resp BP SpO2 Weight O2 Device O2 Flow Rate (L/min) [facility]    08/02/24 0933 98.2 °F (36.8 °C) 93 18 102/59 98 % -- None (Room air) -- AL    08/02/24 0544 -- 87 -- -- -- -- -- --     08/02/24 0510 -- -- -- -- -- 241 lb 4.8 oz (109.5 kg) -- --     08/02/24 0210 97.4 °F (36.3 °C) 88 14 102/66 100 % -- None (Room air) --     08/01/24 2200 97.6 °F (36.4 °C) 88 15 105/74 99 % -- None (Room air) --     08/01/24 1753 -- 90 18 117/65 100 % -- None (Room air) --     08/01/24 1620 98 °F (36.7 °C) 91 20 104/67 100 % -- None (Room air) --     08/01/24 1249 -- 75 20 100/57 100 % -- None (Room air) --     08/01/24 1124 -- 80 18 103/60 98 % -- None (Room air) --     08/01/24 0952 98.4 °F (36.9 °C) 77 16 99/55 97 % -- None (Room air) --     08/01/24 0500 98.3 °F (36.8 °C) 74 14 97/54 99 % -- None (Room air) --     08/01/24 0415 -- -- -- -- -- 248 lb 3.2 oz (112.6 kg) -- --        8/2 UROLOGY NOTE    Reason for Consultation:  Urinary retention      History of Present Illness:  Shubham Zurita is a a(n) 54 year old female admitted with severe hyperthyroid, a-fib, heart failure and sig weight gain (fluid overload). Patient is currently not very ambulatory and being treated with diuretics, requiring close output monitoring.  She denies prior episodes of urine retention, no frequency/urgency, no incontinence and no dysuria or hematuria.      Impression:   Urine retention - likely multifactorial, ie non-ambulatory state, generalized weakness, possibly hypotonic bladder, current thyroid issues    A-fib, Thyrotoxicosis, CHF      Recommendations:  Recommend keep durbin for bladder rest (until medically stabilized)   Patient initiated on oral tamsulosin per primary service - benefit of such med in female uncertain   Await void trial until patient  regains strength and more ambulatory   May require further workup as out patient if fails void trial prior to discharge      8/2 CARDIOLOGY NOTE    Subjective:  No acute events overnight  Resting comfortably in bed  Down 3L yesteda, but she  really doesn't notice any difference      Objective:  /66 (BP Location: Right arm)   Pulse 87   Temp 97.4 °F (36.3 °C) (Axillary)   Resp 14   Ht 5' 4\" (1.626 m)   Wt 241 lb 4.8 oz (109.5 kg)   SpO2 100%   BMI 41.42 kg/m²      Telemetry: Afib     Intake/Output:     Intake/Output Summary (Last 24 hours) at 8/2/2024 0846  Last data filed at 8/2/2024 0649      Gross per 24 hour   Intake 1018.4 ml   Output 5725 ml   Net -4706.6 ml      Recent Labs   Lab 07/31/24  0618 08/01/24  0420 08/02/24  0641   * 100* 90   BUN 17 23 24*   CREATSERUM 0.97 1.14* 1.13*   EGFRCR 69 57* 57*   CA 8.6* 8.1* 8.5*    137 135*   K 4.2 4.1 3.8    107 104   CO2 24.0 24.0 27.0             Recent Labs   Lab 07/30/24  0710 07/31/24  0618 08/01/24  0420 08/02/24  0641   RBC 3.36* 3.37* 3.53* 3.50*   HGB 7.1* 7.2* 7.7* 7.4*   HCT 25.1* 24.4* 25.5* 25.6*   MCV 74.7* 72.4* 72.2* 73.1*   MCH 21.1* 21.4* 21.8* 21.1*   MCHC 28.3* 29.5* 30.2* 28.9*   RDW 19.6* 19.0* 19.0* 18.8*   NEPRELIM 1.99 3.64  --  2.88   WBC 2.9* 6.6 7.4 5.1   .0 320.0 348.0 302.0          Medications:  Scheduled Medications    magnesium oxide  400 mg Oral Once    methIMAzole  20 mg Oral Daily    tamsulosin  0.4 mg Oral Daily @ 0700    dilTIAZem  30 mg Oral Q8H [facility]    propranolol  20 mg Oral TID         Medication Infusions    bumetanide (Bumex) 12.5 mg in 50 mL infusion 1 mg/hr (08/02/24 0211)    continuous dose heparin 1,000 Units/hr (08/02/24 0746)            Assessment:    Assessment:  53yo presenting with one month of SOB,fatigue, and 60lb weight gain. Found to be in Afib.      New onset Afib  Duration unclear   Rate controlled on Propranolol and diltiazem 30mg q8h  Goal HR 80-100bpm at rest    [id number] (female, HF)   On heparin gtt, eventual DOAC  Acute HFpEF/ RV Failure with wide-open TR, mod MR  Clinically appears to have Right sided HF, perhaps related to pulmonary hypertensionn r/t thryotoxicosis   Brisk diuresis on bumex gtt     Hypervolemic hyponatremia  Thyrotoxicosis with crisis  New on Methimazole  CHOLO  Hgb 7.7     Plan:     Continue bumex gtt, repeat labs this afternoon for electrolyte monitoring.   Add Aldactone 12.5mg daily  Bmp am

## 2024-08-02 NOTE — PROGRESS NOTES
[facility]  part of [facility]    Progress Note    Shubham Zurita Patient Status:  Inpatient    1970 MRN [id number]   Location [facility] 3W/SW Attending Sara Leiva MD   Hosp Day # 4 PCP No primary care provider on file.     Subjective:  She is feeling well and overall improved. No palpitations.     A comprehensive 10 point review of systems was completed.  Pertinent positives and negatives noted in the the HPI.      Objective/Physical Exam:  Physical Exam:   General: Alert, orientated x3.  Cooperative.  No apparent distress.  Vital Signs:  Blood pressure 102/66, pulse 87, temperature 97.4 °F (36.3 °C), temperature source Axillary, resp. rate 14, height 5' 4\" (1.626 m), weight 241 lb 4.8 oz (109.5 kg), SpO2 100%.  HEENT: Exam is unremarkable.  Neck: Supple.  Lungs: Clear bilaterally.  Cardiac: Regular rate and rhythm.  Abdomen:  Bowel sounds present, normoactive.  Nontender.  Extremities:  No lower extremity edema noted.  Skin: Normal texture and turgor.  Lymphatic:  No cervical lymphadenopathy.    Labs:  FT4 1.3    Assessment/Plan:  Patient Active Problem List   Diagnosis    Atrial fibrillation with rapid ventricular response (HCC)    Thyrotoxicosis with thyrotoxic crisis, unspecified thyrotoxicosis type    Acute congestive heart failure, unspecified heart failure type (HCC)    Cardiomyopathy, unspecified type (HCC)    Anemia, unspecified type    Acute heart failure (HCC)    Graves disease    Graves' orbitopathy       Graves Disease  - New diagnosis present with Afib with RVR  - Now maintained on Methimazole 20mg PO daily   - FT4 normalized  - Overall symptoms are improving  - Recommend discharge home on current dose of methimazole and Follow up Endocrine in 2 weeks    Endocrine service will sign off and please call back with questions.       Dodie Norris MD  2024  7:32 AM

## 2024-08-02 NOTE — PROGRESS NOTES
Hello all questions with this.  Close today    [facility] Cardiology Progress Note    Shubham Zurita Patient Status:  Inpatient    1970 MRN [id number]   Location [facility] 3W/SW Attending Sara Leiva MD   Hosp Day # 4 PCP No primary care provider on file.     Subjective:  No acute events overnight  Resting comfortably in bed  Down 3L yesteda, but she  really doesn't notice any difference     Objective:  /66 (BP Location: Right arm)   Pulse 87   Temp 97.4 °F (36.3 °C) (Axillary)   Resp 14   Ht 5' 4\" (1.626 m)   Wt 241 lb 4.8 oz (109.5 kg)   SpO2 100%   BMI 41.42 kg/m²     Telemetry: Afib    Intake/Output:    Intake/Output Summary (Last 24 hours) at 2024 0846  Last data filed at 2024 0649  Gross per 24 hour   Intake 1018.4 ml   Output 5725 ml   Net -4706.6 ml       Last 3 Weights   24 0510 241 lb 4.8 oz (109.5 kg)   24 0415 248 lb 3.2 oz (112.6 kg)   24 1049 243 lb 9.6 oz (110.5 kg)   24 0300 248 lb 12.8 oz (112.9 kg)   24 0007 244 lb 1.6 oz (110.7 kg)   24 1835 242 lb 8.1 oz (110 kg)   24 1730 240 lb (108.9 kg)       Labs:  Recent Labs   Lab 24  0618 24  0420 24  0641   * 100* 90   BUN 17  24*   CREATSERUM 0.97 1.14* 1.13*   EGFRCR 69 57* 57*   CA 8.6* 8.1* 8.5*    137 135*   K 4.2 4.1 3.8    107 104   CO2 24.0 24.0 27.0     Recent Labs   Lab 24  0710 24  0618 24  0420 24  0641   RBC 3.36* 3.37* 3.53* 3.50*   HGB 7.1* 7.2* 7.7* 7.4*   HCT 25.1* 24.4* 25.5* 25.6*   MCV 74.7* 72.4* 72.2* 73.1*   MCH 21.1* 21.4* 21.8* 21.1*   MCHC 28.3* 29.5* 30.2* 28.9*   RDW 19.6* 19.0* 19.0* 18.8*   NEPRELIM 1.99 3.64  --  2.88   WBC 2.9* 6.6 7.4 5.1   .0 320.0 348.0 302.0         Recent Labs   Lab 24  1731   TROPHS 33       Diagnostics:             Physical Exam:    Physical Exam  Cardiovascular:      Rate and Rhythm: Normal rate. Rhythm irregular.   Pulmonary:       Effort: Pulmonary effort is normal.   Musculoskeletal:      Right lower leg: Edema present.      Left lower leg: Edema present.   Skin:     General: Skin is warm and dry.   Neurological:      Mental Status: She is alert and oriented to person, place, and time.         Medications:   magnesium oxide  400 mg Oral Once    methIMAzole  20 mg Oral Daily    tamsulosin  0.4 mg Oral Daily @ 0700    dilTIAZem  30 mg Oral Q8H [facility]    propranolol  20 mg Oral TID      bumetanide (Bumex) 12.5 mg in 50 mL infusion 1 mg/hr (08/02/24 0211)    continuous dose heparin 1,000 Units/hr (08/02/24 0746)       Assessment:    Assessment:  53yo presenting with one month of SOB,fatigue, and 60lb weight gain. Found to be in Afib.      New onset Afib  Duration unclear   Rate controlled on Propranolol and diltiazem 30mg q8h  Goal HR 80-100bpm at rest   [id number]=2 (female, HF)   On heparin gtt, eventual DOAC  Acute HFpEF/ RV Failure with wide-open TR, mod MR  Clinically appears to have Right sided HF, perhaps related to pulmonary hypertensionn r/t thryotoxicosis   Brisk diuresis on bumex gtt    Hypervolemic hyponatremia  Thyrotoxicosis with crisis  New on Methimazole  CHOLO  Hgb 7.7    Plan:    Continue bumex gtt, repeat labs this afternoon for electrolyte monitoring.   Add Aldactone 12.5mg daily  Bmp am     Meeta Rodriguez, JULIAN  8/2/2024  7:43 AM  Ph [phone] ([place])  Ph [phone] ([place])  [facility]      L3  Seen and examined bedside. Agree with the present management, will follow with you.    Great response to Bumex drip continue at 1 mg an hour.  As spironolactone 12.5 mg daily.  Repeat BMP tomorrow.  Thank you for allowing me to participate in the care of your patient, feel free to contact me if you have any questions.    Palomo Clement MD  [facility]  Interventional Cardiology.  [phone]

## 2024-08-02 NOTE — PLAN OF CARE
Bumex drip, heparin drip continues. Venegas in place- diuresing well. PT/OT consult. Needs stool for OB. Will continue to monitor.    Problem: Patient Centered Care  Goal: Patient preferences are identified and integrated in the patient's plan of care  Description: Interventions:  - What would you like us to know as we care for you? From home with   - Provide timely, complete, and accurate information to patient/family  - Incorporate patient and family knowledge, values, beliefs, and cultural backgrounds into the planning and delivery of care  - Encourage patient/family to participate in care and decision-making at the level they choose  - Honor patient and family perspectives and choices  Outcome: Progressing     Problem: CARDIOVASCULAR - ADULT  Goal: Maintains optimal cardiac output and hemodynamic stability  Description: INTERVENTIONS:  - Monitor vital signs, rhythm, and trends  - Monitor for bleeding, hypotension and signs of decreased cardiac output  - Evaluate effectiveness of vasoactive medications to optimize hemodynamic stability  - Monitor arterial and/or venous puncture sites for bleeding and/or hematoma  - Assess quality of pulses, skin color and temperature  - Assess for signs of decreased coronary artery perfusion - ex. Angina  - Evaluate fluid balance, assess for edema, trend weights  Outcome: Progressing  Goal: Absence of cardiac arrhythmias or at baseline  Description: INTERVENTIONS:  - Continuous cardiac monitoring, monitor vital signs, obtain 12 lead EKG if indicated  - Evaluate effectiveness of antiarrhythmic and heart rate control medications as ordered  - Initiate emergency measures for life threatening arrhythmias  - Monitor electrolytes and administer replacement therapy as ordered  Outcome: Progressing     Problem: SAFETY ADULT - FALL  Goal: Free from fall injury  Description: INTERVENTIONS:  - Assess pt frequently for physical needs  - Identify cognitive and physical deficits and  behaviors that affect risk of falls.  - [place] fall precautions as indicated by assessment.  - Educate pt/family on patient safety including physical limitations  - Instruct pt to call for assistance with activity based on assessment  - Modify environment to reduce risk of injury  - Provide assistive devices as appropriate  - Consider OT/PT consult to assist with strengthening/mobility  - Encourage toileting schedule  Outcome: Progressing     Problem: DISCHARGE PLANNING  Goal: Discharge to home or other facility with appropriate resources  Description: INTERVENTIONS:  - Identify barriers to discharge w/pt and caregiver  - Include patient/family/discharge partner in discharge planning  - Arrange for needed discharge resources and transportation as appropriate  - Identify discharge learning needs (meds, wound care, etc)  - Arrange for interpreters to assist at discharge as needed  - Consider post-discharge preferences of patient/family/discharge partner  - Complete POLST form as appropriate  - Assess patient's ability to be responsible for managing their own health  - Refer to Case Management Department for coordinating discharge planning if the patient needs post-hospital services based on physician/LIP order or complex needs related to functional status, cognitive ability or social support system  Outcome: Progressing

## 2024-08-02 NOTE — PHYSICAL THERAPY NOTE
PHYSICAL THERAPY EVALUATION - INPATIENT     Room Number: 348/348-A  Evaluation Date: 8/2/2024  Type of Evaluation: Initial   Physician Order: PT Eval and Treat    Presenting Problem: graves dz, afib, weight gain     Reason for Therapy: Mobility Dysfunction and Discharge Planning    PHYSICAL THERAPY ASSESSMENT   Patient is a 54 year old female admitted 7/29/2024 for BLE swelling, afib. Pt admitted from immediate care.  Prior to admission, patient's baseline is indep with ADLs and mobility, though requiring inc time. Pt with difficulty performing stairs and getting off of toilet d/t BLE swelling. Pt reports improvement since her hospitalization. Patient is currently functioning below baseline with bed mobility, transfers, gait, and stair negotiation.  Patient is requiring contact guard assist as a result of the following impairments: decreased functional strength, decreased endurance/aerobic capacity, and BLE swelling .  Physical Therapy will continue to follow for duration of hospitalization.    Patient will benefit from continued skilled PT Services for duration of hospitalization, however, given the patient is functioning near baseline level do not anticipate skilled therapy needs at discharge .    PLAN  PT Treatment Plan: Bed mobility;Body mechanics;Energy conservation;Endurance;Patient education;Family education;Gait training;Strengthening;Stair training;Transfer training;Balance training  Rehab Potential : Good  Frequency (Obs): 3-5x/week    PHYSICAL THERAPY MEDICAL/SOCIAL HISTORY     Problem List  Principal Problem:    Atrial fibrillation with rapid ventricular response (HCC)  Active Problems:    Thyrotoxicosis with thyrotoxic crisis, unspecified thyrotoxicosis type    Acute congestive heart failure, unspecified heart failure type (HCC)    Cardiomyopathy, unspecified type (HCC)    Anemia, unspecified type    Acute heart failure (HCC)    Graves disease    Graves' orbitopathy      HOME SITUATION  Home  Situation  Type of Home: [facility]  Home Layout: Two level  Stairs to Enter : 2  Railing: No  Stairs to Bedroom: 14  Railing: Yes  Lives With: Spouse  Drives: Yes  Patient Owned Equipment: None  Patient Regularly Uses: Glasses       SUBJECTIVE  It's better than it had been.    PHYSICAL THERAPY EXAMINATION   OBJECTIVE  Precautions:  (fluid retention BLEs)  Fall Risk: Standard fall risk    WEIGHT BEARING RESTRICTION  Weight Bearing Restriction: None                PAIN ASSESSMENT  Ratin  Location: denies pain       COGNITION  Overall Cognitive Status:  WFL - within functional limits    RANGE OF MOTION AND STRENGTH ASSESSMENT  Upper extremity ROM and strength are within functional limits   Lower extremity ROM is limited d/t swelling  Lower extremity strength is limited d/t swelling    BALANCE  Static Sitting: Good  Dynamic Sitting: Fair +  Static Standing: Fair  Dynamic Standing: Fair    ADDITIONAL TESTS                                    NEUROLOGICAL FINDINGS                      ACTIVITY TOLERANCE                         O2 WALK       AM-PAC '6-Clicks' INPATIENT SHORT FORM - BASIC MOBILITY  How much difficulty does the patient currently have...  Patient Difficulty: Turning over in bed (including adjusting bedclothes, sheets and blankets)?: A Little   Patient Difficulty: Sitting down on and standing up from a chair with arms (e.g., wheelchair, bedside commode, etc.): A Little   Patient Difficulty: Moving from lying on back to sitting on the side of the bed?: A Little   How much help from another person does the patient currently need...   Help from Another: Moving to and from a bed to a chair (including a wheelchair)?: A Little   Help from Another: Need to walk in hospital room?: A Little   Help from Another: Climbing 3-5 steps with a railing?: A Little     AM-PAC Score:  Raw Score: 18   Approx Degree of Impairment: 46.58%   Standardized Score (AM-PAC Scale): 43.63   CMS Modifier (G-Code): CK    FUNCTIONAL ABILITY  STATUS  Functional Mobility/Gait Assessment  Gait Assistance: Contact guard assist  Distance (ft): 15  Assistive Device: None (IV pole)  Pattern:  (dec jaison, inc lat weight shift)  Rolling: supervision  Supine to Sit: minimal assist  Sit to Supine:  NT  Sit to Stand: contact guard assist    Exercise/Education Provided:  Bed mobility  Body mechanics  Gait training  Transfer training  PT Eval    Skilled Therapy Provided: Pt ok to be seen per NASRIN Almodovar. Pt educ in role of PT and PT POC. Pt willing to participate. Pt's spouse at bedside, supportive. Pt has been struggling with mobility at home d/t swelling. Pt and  reported 2 hrs sitting on toilet because of inability to get off of toilet. Pt with inc time for all mobility and ADLs, lydia performing stairs. Pt limits stairs to 1x per day. Pt reports improvement since coming to hospital. Pt with Kaiden at LE for sup to sit with HOB elevated. Pt CGA for sit to stand and amb with IV pole. Pt declined hallway amb at this time. Anticipate that pt's mobility will continue to improve with fluid reduction, will continue to follow.    The patient's Approx Degree of Impairment: 46.58% has been calculated based on documentation in the [facility] '6 clicks' Inpatient Basic Mobility Short Form.  Research supports that patients with this level of impairment may benefit from home with HHPT. Pt may not need HHPT pending mobility as her swelling improves..  Final disposition will be made by interdisciplinary medical team.    Patient End of Session: Up in chair;Needs met;Call light within reach;RN aware of session/findings;All patient questions and concerns addressed;Family present    CURRENT GOALS  Goals to be met by: 8/9/24  Patient Goal Patient's self-stated goal is: less fluid, improved mobility   Goal #1 Patient is able to demonstrate supine - sit EOB @ level: modified independent     Goal #1   Current Status    Goal #2 Patient is able to demonstrate transfers Sit to/from Stand at  assistance level: modified independent with none     Goal #2  Current Status    Goal #3 Patient is able to ambulate 150 feet with assist device: none at assistance level: supervision   Goal #3   Current Status    Goal #4 Patient will negotiate 14 stairs with rail and supervision   Goal #4   Current Status    Goal #5 Patient to demonstrate independence with home activity/exercise instructions provided to patient in preparation for discharge.   Goal #5   Current Status    Goal #6    Goal #6  Current Status      Patient Evaluation Complexity Level:  History Low - no personal factors and/or co-morbidities   Examination of body systems Low -  addressing 1-2 elements   Clinical Presentation Low- Stable   Clinical Decision Making  Low Complexity     Gait Training: 15 minutes

## 2024-08-02 NOTE — PROGRESS NOTES
Progress Note     Shubham Zurita Patient Status:  Inpatient    1970 MRN [id number]   Location [facility] 3W/SW Attending Sara Leiva MD   Hosp Day # 4 PCP No primary care provider on file.     Subjective:   S: Patient with minimal response to Lasix, started on Bumex gtt with over 8L urine production. Patient feels about the same.      Review of Systems:   10 point ROS completed and was negative, except for pertinent positive and negatives stated in subjective.    Objective:   Vital signs:  Temp:  [97.4 °F (36.3 °C)-98.2 °F (36.8 °C)] 98.2 °F (36.8 °C)  Pulse:  [87-93] 93  Resp:  [14-20] 20  BP: (101-117)/(59-74) 101/63  SpO2:  [98 %-100 %] 100 %    Wt Readings from Last 6 Encounters:   24 241 lb 4.8 oz (109.5 kg)         Physical Exam:      General: NAD, +Anasarca  Respiratory: Clear to auscultation bilaterally.  Cardiovascular: RRR  Abdomen: Soft, nontender, nondistended.  Positive bowel sounds. No rebound or guarding.  Neurologic: No focal neurological deficits.   Musculoskeletal: Moves all extremities.  Extremities: LE lymphadematous legs bilaterally     Results:   Diagnostic Data:      Labs:    Labs Last 24 Hours:   BMP     CBC    Other     Na 137 Cl 104 BUN 26 Glu 96   Hb 7.4   PTT 56.5 Procal -   K 3.8 CO2 28.0 Cr 1.15   WBC 5.1 >< .0  INR - CRP -   Renal Lytes Endo    Hct 25.6   Trop - D dim -   eGFR - Ca 8.9 POC Gluc  -    LFT   pBNP - Lactic -   eGFR AA - PO4 - A1c -   AST - APk - Prot -  LDL -     Mg 1.7 TSH -   ALT - T jagdeep - Alb -        COVID-19 Lab Results    COVID-19  No results found for: \"COVID19\"    Pro-Calcitonin  No results for input(s): \"PCT\" in the last 168 hours.    Cardiac  No results for input(s): \"TROP\", \"PBNP\" in the last 168 hours.    Creatinine Kinase  No results for input(s): \"CK\" in the last 168 hours.    Inflammatory Markers  Recent Labs   Lab 24  1241   JORGE LUIS 19.5*       Imaging: Imaging data reviewed in Epic.    Medications:    spironolactone   12.5 mg Oral Daily    methIMAzole  20 mg Oral Daily    tamsulosin  0.4 mg Oral Daily @ 0700    dilTIAZem  30 mg Oral Q8H DC    propranolol  20 mg Oral TID       Assessment & Plan:   ASSESSMENT / PLAN:     Atrial fibrillation with rapid ventricular response  Due to hyperthyroidism  - TSH low, started on methimazole  - cards on consult  - off dilt gtt  - cont po diltiazem, cont po propranolol  - heparin gtt, plan to transition to oral anticoag   -Echo shows ejection fraction is 60 to 65%     HFpEF  - Ejection fraction is 60 to 65%, cannot assess LV dysfunction due to heart rhythm   - switch IV Lasix 40 mg twice daily to Bumex gtt   - spironolactone 12.5mg every day added   - Strict I's and O's and daily weights  - cards on consult     Severe hyperthyroidism  Graves' disease    - Admission FT4 high with suppressed TCH  - strong family history of autoimmune thyroid disease   - Was given PTU and dexamethasone in the ED  - Swtiched Methimazole 20 mg twice daily --> Methimazole daily  - FT4 normalized  - Endocrinology on consult  - Pt needs strict endo follow up OP in 2 weeks      Microcytic anemia  - Iron labs reveal ACD  - cont to monitor H/H, transfuse for Hgb <7  - unclear if pt ever had preventative workup with cscope, pap, etc. Needs strict OP follow up.      Urinary Retention  - pt endorsing good Bms  - on diuretic, durbin inserted 8/1  - flomax for now, dc on discharge  - urology consulted   - Hold VT until medically optimized, cont to monitor     DALTON  - Cr 1.15, trended up from 0.6 on admission   - in setting of lasix vs retention vs other  - Feurea showing intrinsic disease  - monitor diuretic carefully, strict I/os  - follow up AM bmp    Deconditioning  - PT eval       dvt proph:    heparin gtt  Code status:    Full         MDM: High   I personally spent time on chart/note review, review of labs/imaging, discussion with patient, physical exam, discussion with staff, consultants, coordinating care, writing  progress note, and discussion of plan of care.   Acute illness posing threat to life.  IV medications requiring close inpatient monitoring       Sara Leiva MD    Supplementary Documentation:

## 2024-08-03 LAB
ANION GAP SERPL CALC-SCNC: 5 MMOL/L (ref 0–18)
APTT PPP: 63.1 SECONDS (ref 23–36)
BASOPHILS # BLD AUTO: 0.06 X10(3) UL (ref 0–0.2)
BASOPHILS NFR BLD AUTO: 1 %
BUN BLD-MCNC: 25 MG/DL (ref 9–23)
BUN/CREAT SERPL: 23.4 (ref 10–20)
CALCIUM BLD-MCNC: 9.1 MG/DL (ref 8.7–10.4)
CHLORIDE SERPL-SCNC: 102 MMOL/L (ref 98–112)
CO2 SERPL-SCNC: 31 MMOL/L (ref 21–32)
CREAT BLD-MCNC: 1.07 MG/DL
DEPRECATED RDW RBC AUTO: 48.2 FL (ref 35.1–46.3)
EGFRCR SERPLBLD CKD-EPI 2021: 61 ML/MIN/1.73M2 (ref 60–?)
EOSINOPHIL # BLD AUTO: 0.13 X10(3) UL (ref 0–0.7)
EOSINOPHIL NFR BLD AUTO: 2.2 %
ERYTHROCYTE [DISTWIDTH] IN BLOOD BY AUTOMATED COUNT: 18.4 % (ref 11–15)
GLUCOSE BLD-MCNC: 90 MG/DL (ref 70–99)
HCT VFR BLD AUTO: 25.8 %
HGB BLD-MCNC: 7.6 G/DL
IMM GRANULOCYTES # BLD AUTO: 0.02 X10(3) UL (ref 0–1)
IMM GRANULOCYTES NFR BLD: 0.3 %
LYMPHOCYTES # BLD AUTO: 1.7 X10(3) UL (ref 1–4)
LYMPHOCYTES NFR BLD AUTO: 28.8 %
MAGNESIUM SERPL-MCNC: 1.5 MG/DL (ref 1.6–2.6)
MCH RBC QN AUTO: 21.3 PG (ref 26–34)
MCHC RBC AUTO-ENTMCNC: 29.5 G/DL (ref 31–37)
MCV RBC AUTO: 72.3 FL
MONOCYTES # BLD AUTO: 0.49 X10(3) UL (ref 0.1–1)
MONOCYTES NFR BLD AUTO: 8.3 %
NEUTROPHILS # BLD AUTO: 3.51 X10 (3) UL (ref 1.5–7.7)
NEUTROPHILS # BLD AUTO: 3.51 X10(3) UL (ref 1.5–7.7)
NEUTROPHILS NFR BLD AUTO: 59.4 %
OSMOLALITY SERPL CALC.SUM OF ELEC: 290 MOSM/KG (ref 275–295)
PLATELET # BLD AUTO: 311 10(3)UL (ref 150–450)
POTASSIUM SERPL-SCNC: 3.8 MMOL/L (ref 3.5–5.1)
RBC # BLD AUTO: 3.57 X10(6)UL
SODIUM SERPL-SCNC: 138 MMOL/L (ref 136–145)
WBC # BLD AUTO: 5.9 X10(3) UL (ref 4–11)

## 2024-08-03 PROCEDURE — 99233 SBSQ HOSP IP/OBS HIGH 50: CPT | Performed by: STUDENT IN AN ORGANIZED HEALTH CARE EDUCATION/TRAINING PROGRAM

## 2024-08-03 RX ORDER — MAGNESIUM OXIDE 400 MG/1
800 TABLET ORAL ONCE
Status: COMPLETED | OUTPATIENT
Start: 2024-08-03 | End: 2024-08-03

## 2024-08-03 NOTE — PROGRESS NOTES
[facility]  Cardiology Progress Note    Shubham Zurita Patient Status:  Inpatient    1970 MRN [id number]   Location [facility] 3W/SW Attending Sara Leiva MD   Hosp Day # 5 PCP No primary care provider on file.       Subjective:  No acute events overnight  No SOB today  Still has LE edema but feel less swollen    --------------------------------------------------------------------------------------------------------------------------------  ROS 12 systems reviewed and at baseline, pertinent findings above.      History:  History reviewed. No pertinent past medical history.  History reviewed. No pertinent surgical history.  No family history on file.   reports that she has never smoked. She has never used smokeless tobacco. She reports that she does not drink alcohol and does not use drugs.    Objective:   Temp: 98.8 °F (37.1 °C)  Pulse: 92  Resp: 20  BP: 112/66    Intake/Output:     Intake/Output Summary (Last 24 hours) at 8/3/2024 0837  Last data filed at 8/3/2024 0445  Gross per 24 hour   Intake 600 ml   Output 5500 ml   Net -4900 ml       Physical Exam:  General: NAD.  HEENT: Normocephalic.  Neck: Supple.  Cardiac: Irreg irreg. S1, S2 normal. No murmur.  Lungs: GAEB.  Extremities: LE edema 3/4+ BL pitting.    Tele Afib    Assessment:    New Afib  Right sided CHF  Severe TR  Thyrotoxicosis      Plan:  Good response to diuresis. Still has significant volume overload. Continue Bumex. Monitor Cr, Uoutput, I/Os.  Replete K, Mg    Discussed with patient. Questions answered.    Please call with questions.     Level of care: L3    Gilson Child MD  Interventional Cardiology  [facility]    8/3/2024  8:37 AM

## 2024-08-03 NOTE — PROGRESS NOTES
Progress Note     Shubham Zurita Patient Status:  Inpatient    1970 MRN [id number]   Location [facility] 3W/SW Attending Sara Leiva MD   Hosp Day # 5 PCP No primary care provider on file.     Subjective:   S: Patient sleepy this morning, denied acute complaints.  Remains on Bumex gtt with good UOP.    Review of Systems:   10 point ROS completed and was negative, except for pertinent positive and negatives stated in subjective.    Objective:   Vital signs:  Temp:  [97.8 °F (36.6 °C)-98.8 °F (37.1 °C)] 97.8 °F (36.6 °C)  Pulse:  [86-92] 90  Resp:  [18-20] 18  BP: (100-121)/(55-66) 121/60  SpO2:  [97 %-100 %] 98 %    Wt Readings from Last 6 Encounters:   24 232 lb 8 oz (105.5 kg)         Physical Exam:      General: NAD, +Anasarca  Respiratory: Clear to auscultation bilaterally.  Cardiovascular: RRR  Abdomen: Soft, nontender, nondistended.  Positive bowel sounds. No rebound or guarding.  Neurologic: No focal neurological deficits.   Musculoskeletal: Moves all extremities.  Extremities: LE lymphadematous legs bilaterally     Results:   Diagnostic Data:      Labs:    Labs Last 24 Hours:   BMP     CBC    Other     Na 138 Cl 102 BUN 25 Glu 90   Hb 7.6   PTT 63.1 Procal -   K 3.8 CO2 31.0 Cr 1.07   WBC 5.9 >< .0  INR - CRP -   Renal Lytes Endo    Hct 25.8   Trop - D dim -   eGFR - Ca 9.1 POC Gluc  -    LFT   pBNP - Lactic -   eGFR AA - PO4 - A1c -   AST - APk - Prot -  LDL -     Mg 1.5 TSH -   ALT - T jagdeep - Alb -        COVID-19 Lab Results    COVID-19  No results found for: \"COVID19\"    Pro-Calcitonin  No results for input(s): \"PCT\" in the last 168 hours.    Cardiac  No results for input(s): \"TROP\", \"PBNP\" in the last 168 hours.    Creatinine Kinase  No results for input(s): \"CK\" in the last 168 hours.    Inflammatory Markers  Recent Labs   Lab 24  1241   JORGE LUIS 19.5*       Imaging: Imaging data reviewed in Epic.    Medications:    spironolactone  12.5 mg Oral Daily    methIMAzole  20  mg Oral Daily    tamsulosin  0.4 mg Oral Daily @ 0700    dilTIAZem  30 mg Oral Q8H DC    propranolol  20 mg Oral TID       Assessment & Plan:   ASSESSMENT / PLAN:     Atrial fibrillation with rapid ventricular response  Due to hyperthyroidism  - TSH low, started on methimazole  - cards on consult  - off dilt gtt  - cont po diltiazem, cont po propranolol  - heparin gtt, plan to transition to oral anticoag   -Echo shows ejection fraction is 60 to 65%     HFpEF  - Ejection fraction is 60 to 65%, cannot assess LV dysfunction due to heart rhythm   - switched IV Lasix 40 mg twice daily to Bumex gtt   - spironolactone 12.5mg every day added   - Strict I's and O's and daily weights, replete e-lytes per protocol  - cards on consult     Severe hyperthyroidism  Graves' disease    - Admission FT4 high with suppressed TCH  - strong family history of autoimmune thyroid disease   - Was given PTU and dexamethasone in the ED  - Endocrinology consulted  - Swtiched Methimazole 20 mg twice daily --> Methimazole daily  - FT4 normalized  - Pt needs strict endo follow up OP in 2 weeks      Microcytic anemia  - Iron labs reveal ACD  - cont to monitor H/H, transfuse for Hgb <7  - unclear if pt ever had preventative workup with cscope, pap, etc. Needs strict OP follow up.      Urinary Retention  - pt endorsing good Bms  - on diuretic, durbin inserted 8/1  - flomax for now, dc on discharge  - urology consulted   - Hold VT until medically optimized, cont to monitor     DALTON, improving  - Cr initially trended up to 1.15 from 0.6 on admission   - in setting of lasix vs retention vs other  - Feurea was intrinsic disease  - monitor diuretic carefully, strict I/os    Deconditioning  - PT       dvt proph:    heparin gtt  Code status:    Full     Dispo: anticipate home, pending clinical course     MDM: High   I personally spent time on chart/note review, review of labs/imaging, discussion with patient, physical exam, discussion with staff,  consultants, coordinating care, writing progress note, and discussion of plan of care.   Acute illness posing threat to life.  IV medications requiring close inpatient monitoring       Sara Leiva MD    Supplementary Documentation:

## 2024-08-03 NOTE — PLAN OF CARE
Patient is alert and oriented. Patient is on an IV drip one containing heparin the other is bumex. All safety measures are in place and call light is within reach.    Problem: Patient Centered Care  Goal: Patient preferences are identified and integrated in the patient's plan of care  Description: Interventions:  - What would you like us to know as we care for you? From home with   - Provide timely, complete, and accurate information to patient/family  - Incorporate patient and family knowledge, values, beliefs, and cultural backgrounds into the planning and delivery of care  - Encourage patient/family to participate in care and decision-making at the level they choose  - Honor patient and family perspectives and choices  Outcome: Progressing     Problem: Patient/Family Goals  Goal: Patient/Family Long Term Goal  Description: Patient's Long Term Goal: go home    Interventions:  - follow md orders  - See additional Care Plan goals for specific interventions  Outcome: Progressing  Goal: Patient/Family Short Term Goal  Description: Patient's Short Term Goal: remain safe in hospital    Interventions:   - monitor on tele  - heparin gtt  - See additional Care Plan goals for specific interventions  Outcome: Progressing     Problem: CARDIOVASCULAR - ADULT  Goal: Maintains optimal cardiac output and hemodynamic stability  Description: INTERVENTIONS:  - Monitor vital signs, rhythm, and trends  - Monitor for bleeding, hypotension and signs of decreased cardiac output  - Evaluate effectiveness of vasoactive medications to optimize hemodynamic stability  - Monitor arterial and/or venous puncture sites for bleeding and/or hematoma  - Assess quality of pulses, skin color and temperature  - Assess for signs of decreased coronary artery perfusion - ex. Angina  - Evaluate fluid balance, assess for edema, trend weights  Outcome: Progressing  Goal: Absence of cardiac arrhythmias or at baseline  Description: INTERVENTIONS:  -  Continuous cardiac monitoring, monitor vital signs, obtain 12 lead EKG if indicated  - Evaluate effectiveness of antiarrhythmic and heart rate control medications as ordered  - Initiate emergency measures for life threatening arrhythmias  - Monitor electrolytes and administer replacement therapy as ordered  Outcome: Progressing     Problem: SAFETY ADULT - FALL  Goal: Free from fall injury  Description: INTERVENTIONS:  - Assess pt frequently for physical needs  - Identify cognitive and physical deficits and behaviors that affect risk of falls.  - Hansen fall precautions as indicated by assessment.  - Educate pt/family on patient safety including physical limitations  - Instruct pt to call for assistance with activity based on assessment  - Modify environment to reduce risk of injury  - Provide assistive devices as appropriate  - Consider OT/PT consult to assist with strengthening/mobility  - Encourage toileting schedule  Outcome: Progressing     Problem: DISCHARGE PLANNING  Goal: Discharge to home or other facility with appropriate resources  Description: INTERVENTIONS:  - Identify barriers to discharge w/pt and caregiver  - Include patient/family/discharge partner in discharge planning  - Arrange for needed discharge resources and transportation as appropriate  - Identify discharge learning needs (meds, wound care, etc)  - Arrange for interpreters to assist at discharge as needed  - Consider post-discharge preferences of patient/family/discharge partner  - Complete POLST form as appropriate  - Assess patient's ability to be responsible for managing their own health  - Refer to Case Management Department for coordinating discharge planning if the patient needs post-hospital services based on physician/LIP order or complex needs related to functional status, cognitive ability or social support system  Outcome: Progressing

## 2024-08-04 LAB
ANION GAP SERPL CALC-SCNC: 5 MMOL/L (ref 0–18)
ANTIBODY SCREEN: NEGATIVE
APTT PPP: 72.3 SECONDS (ref 23–36)
BASOPHILS # BLD AUTO: 0.03 X10(3) UL (ref 0–0.2)
BASOPHILS NFR BLD AUTO: 0.6 %
BUN BLD-MCNC: 25 MG/DL (ref 9–23)
BUN/CREAT SERPL: 24 (ref 10–20)
CALCIUM BLD-MCNC: 8.9 MG/DL (ref 8.7–10.4)
CHLORIDE SERPL-SCNC: 98 MMOL/L (ref 98–112)
CO2 SERPL-SCNC: 34 MMOL/L (ref 21–32)
CREAT BLD-MCNC: 1.04 MG/DL
DEPRECATED RDW RBC AUTO: 47.4 FL (ref 35.1–46.3)
EGFRCR SERPLBLD CKD-EPI 2021: 63 ML/MIN/1.73M2 (ref 60–?)
EOSINOPHIL # BLD AUTO: 0.15 X10(3) UL (ref 0–0.7)
EOSINOPHIL NFR BLD AUTO: 3.2 %
ERYTHROCYTE [DISTWIDTH] IN BLOOD BY AUTOMATED COUNT: 18.2 % (ref 11–15)
EST. AVERAGE GLUCOSE BLD GHB EST-MCNC: 103 MG/DL (ref 68–126)
GLUCOSE BLD-MCNC: 108 MG/DL (ref 70–99)
HBA1C MFR BLD: 5.2 % (ref ?–5.7)
HCT VFR BLD AUTO: 23.1 %
HCT VFR BLD AUTO: 24.9 %
HEMOCCULT STL QL: NEGATIVE
HGB BLD-MCNC: 6.9 G/DL
HGB BLD-MCNC: 7.8 G/DL
IMM GRANULOCYTES # BLD AUTO: 0.01 X10(3) UL (ref 0–1)
IMM GRANULOCYTES NFR BLD: 0.2 %
LYMPHOCYTES # BLD AUTO: 1.35 X10(3) UL (ref 1–4)
LYMPHOCYTES NFR BLD AUTO: 28.8 %
MAGNESIUM SERPL-MCNC: 1.4 MG/DL (ref 1.6–2.6)
MCH RBC QN AUTO: 21.4 PG (ref 26–34)
MCHC RBC AUTO-ENTMCNC: 29.9 G/DL (ref 31–37)
MCV RBC AUTO: 71.5 FL
MONOCYTES # BLD AUTO: 0.5 X10(3) UL (ref 0.1–1)
MONOCYTES NFR BLD AUTO: 10.7 %
NEUTROPHILS # BLD AUTO: 2.64 X10 (3) UL (ref 1.5–7.7)
NEUTROPHILS # BLD AUTO: 2.64 X10(3) UL (ref 1.5–7.7)
NEUTROPHILS NFR BLD AUTO: 56.5 %
OSMOLALITY SERPL CALC.SUM OF ELEC: 289 MOSM/KG (ref 275–295)
PLATELET # BLD AUTO: 271 10(3)UL (ref 150–450)
POTASSIUM SERPL-SCNC: 3.8 MMOL/L (ref 3.5–5.1)
RBC # BLD AUTO: 3.23 X10(6)UL
RH BLOOD TYPE: POSITIVE
RH BLOOD TYPE: POSITIVE
SODIUM SERPL-SCNC: 137 MMOL/L (ref 136–145)
WBC # BLD AUTO: 4.7 X10(3) UL (ref 4–11)

## 2024-08-04 PROCEDURE — 30233N1 TRANSFUSION OF NONAUTOLOGOUS RED BLOOD CELLS INTO PERIPHERAL VEIN, PERCUTANEOUS APPROACH: ICD-10-PCS | Performed by: HOSPITALIST

## 2024-08-04 PROCEDURE — 99233 SBSQ HOSP IP/OBS HIGH 50: CPT | Performed by: STUDENT IN AN ORGANIZED HEALTH CARE EDUCATION/TRAINING PROGRAM

## 2024-08-04 RX ORDER — SODIUM CHLORIDE 9 MG/ML
INJECTION, SOLUTION INTRAVENOUS ONCE
Status: COMPLETED | OUTPATIENT
Start: 2024-08-04 | End: 2024-08-04

## 2024-08-04 RX ORDER — POLYETHYLENE GLYCOL 3350 17 G/17G
17 POWDER, FOR SOLUTION ORAL DAILY PRN
Status: DISCONTINUED | OUTPATIENT
Start: 2024-08-04 | End: 2024-08-13

## 2024-08-04 RX ORDER — MAGNESIUM OXIDE 400 MG/1
800 TABLET ORAL ONCE
Status: COMPLETED | OUTPATIENT
Start: 2024-08-04 | End: 2024-08-04

## 2024-08-04 RX ORDER — FERROUS SULFATE 325(65) MG
325 TABLET, DELAYED RELEASE (ENTERIC COATED) ORAL 2 TIMES DAILY WITH MEALS
Status: DISCONTINUED | OUTPATIENT
Start: 2024-08-04 | End: 2024-08-13

## 2024-08-04 NOTE — PROGRESS NOTES
[facility]  Cardiology Progress Note    Shubham Zurita Patient Status:  Inpatient    1970 MRN [id number]   Location [facility] 3W/SW Attending Sara Leiva MD   Hosp Day # 6 PCP No primary care provider on file.       Subjective:  Feels okay today  No SOB  Legs still swollen    --------------------------------------------------------------------------------------------------------------------------------  ROS 12 systems reviewed and at baseline, pertinent findings above.      History:  History reviewed. No pertinent past medical history.  History reviewed. No pertinent surgical history.  No family history on file.   reports that she has never smoked. She has never used smokeless tobacco. She reports that she does not drink alcohol and does not use drugs.    Objective:   Temp: 98.9 °F (37.2 °C)  Pulse: 96  Resp: 18  BP: 114/67    Intake/Output:     Intake/Output Summary (Last 24 hours) at 2024 0956  Last data filed at 2024 0716  Gross per 24 hour   Intake 888.47 ml   Output 7450 ml   Net -6561.53 ml       Physical Exam:  General: NAD.  HEENT: Normocephalic.  Neck: Supple.  Cardiac: Irreg irreg. S1, S2 normal. No murmur.  Lungs: GAEB.  Extremities: LE edema 4+ BL pitting.    Tele Afib      Assessment:    New Afib  Right sided CHF  Severe TR  Thyrotoxicosis        Plan:  Excellent response to diuresis. Still has significant volume overload. Continue Bumex. Monitor Cr, Uoutput, I/Os.  Replete K, Mg    Discussed with patient. Questions answered.    Please call with questions.     Level of care: L2    Gilson Child MD  Interventional Cardiology  [facility]    2024  9:56 AM

## 2024-08-04 NOTE — DIETARY NOTE
ADULT NUTRITION INITIAL ASSESSMENT    Pt is at high nutrition risk.  Pt meets moderate malnutrition criteria.      CRITERIA FOR MALNUTRITION DIAGNOSIS:  Criteria for non-severe malnutrition diagnosis: chronic illness related to energy intake less than75% for greater than 1 month, body fat mild depletion, and muscle mass mild depletion.    RECOMMENDATIONS TO MD: See Nutrition Intervention, continue with cardiac diet - encouraged increased intake.      ADMITTING DIAGNOSIS:  Atrial fibrillation with rapid ventricular response (HCC) [I48.91]  Thyrotoxicosis with thyrotoxic crisis, unspecified thyrotoxicosis type [E05.91]  Anemia, unspecified type [D64.9]  Cardiomyopathy, unspecified type (HCC) [I42.9]  Acute congestive heart failure, unspecified heart failure type (HCC) [I50.9]  PERTINENT PAST MEDICAL HISTORY: History reviewed. No pertinent past medical history.    PATIENT STATUS: Initial 08/04/24: Pt admit for A.fib, LE swelling/weight gain. PMH sig for none; doesn't usually see physical or take medication. New diagnosis of Grave's disease this admission along with CHF and anemia - microcytic.  Pt assessed due to screening at risk for MD consult for malnutrition, anemia and anorexia. PT was seen at the bedside.  Pt reports eating much better since admission.  Pt eating 2 meals per day on average and taking % of most meals.  Pt reports that PTA, her po intake was poor due to increased swelling in her LE and difficult getting up to use the bathroom.  Wt Hx: Pts weight has increase due to fluid retention -  lbs 1 year ago per pt.  Current weight of 214 lbs down from admission weight of 240 lbs.  Pt states she lost weight \"during COVID\" due to not eating out at restaurants.  Pt reports her UBW prior to 2020 was ~170 lbs and went down to 134 lbs.  There is no weight hx prior to this admission in EMR to confirm pts account of weight loss. Diet Hx:  Pt states she is \"mostly\" vegetarian, eats chicken and eggs;  doesn't consume/like milk or most dairy products.  Pt states she mostly drinks water at home with 1 coffee per week.  Prior to pts swelling which caused decreased po pt was eating 3 meals per day with a good appetite.   Malnutrition:  Pt has wasting to her upper body - clavicles, temples, triceps.  Pt reports gradual muscle loss over the last few years.  Pt is not interested in ONS and states she doesn't like them.  RD encouraged 3 meals per day or several small meals per day.  RD encouraged both animal and vegetarian sources of iron and encouraged use of Vit C (orange juice) when taking Iron supplement to maximize absorption.         FOOD/NUTRITION RELATED HISTORY:  Appetite: Fair to good and Improved  Intake: ~% x7 meals documented since admit  Intake Meeting Needs: Yes  Percent Meals Eaten (last 3 days)       Date/Time Percent Meals Eaten (%)    08/01/24 1000 85 %    08/01/24 1753 100 %    08/02/24 0721 100 %    08/02/24 1451 80 %    08/03/24 0823 100 %    08/03/24 1918 100 %    08/04/24 1036 75 %           Food Allergies: No Known Food Allergies (NKFA)  Cultural/Ethnic/Uatsdin Preferences: Obtained    GASTROINTESTINAL: +BM 8/4    MEDICATIONS: reviewed   ferrous sulfate  325 mg Oral BID with meals    spironolactone  12.5 mg Oral Daily    methIMAzole  20 mg Oral Daily    tamsulosin  0.4 mg Oral Daily @ 0700    dilTIAZem  30 mg Oral Q8H DC    propranolol  20 mg Oral TID     LABS: reviewed, Mg 1.4 noted   Recent Labs     08/02/24  0641 08/02/24  1350 08/03/24  0717 08/04/24  0936   GLU 90 96 90 108*   BUN 24* 26* 25* 25*   CREATSERUM 1.13* 1.15* 1.07* 1.04*   CA 8.5* 8.9 9.1 8.9   MG 1.7  --  1.5* 1.4*   * 137 138 137   K 3.8 3.8 3.8 3.8    104 102 98   CO2 27.0 28.0 31.0 34.0*   OSMOCALC 284 289 290 289       NUTRITION RELATED PHYSICAL FINDINGS:  - Nutrition Focused Physical Exam (NFPE): mild depletion body fat triceps region and mild depletion muscle mass temple region and clavicle region per  physical and visual exam  - Fluid Accumulation: non-pitting abdominal and +2 Bilateral Lower extremity and Foot  see RN documentation for details  - Skin Integrity: at risk see RN documentation for details    ANTHROPOMETRICS:  HT: 162.6 cm (5' 4\")  WT: 97.7 kg (215 lb 4.8 oz)   BMI: Body mass index is 36.96 kg/m².  BMI CLASSIFICATION: 35-39.9 kg/m2 - obesity class II  IBW: 120 lbs        182% IBW  Usual Body Wt: 134 lbs 1 year ago per patient      160% UBW    WEIGHT HISTORY:  Patient Weight(s) for the past 336 hrs:   Weight   08/04/24 0434 97.7 kg (215 lb 4.8 oz)   08/03/24 0445 105.5 kg (232 lb 8 oz)   08/02/24 0510 109.5 kg (241 lb 4.8 oz)   08/01/24 0415 112.6 kg (248 lb 3.2 oz)   07/31/24 1049 110.5 kg (243 lb 9.6 oz)   07/31/24 0300 112.9 kg (248 lb 12.8 oz)   07/30/24 0007 110.7 kg (244 lb 1.6 oz)   07/29/24 1835 110 kg (242 lb 8.1 oz)   07/29/24 1730 108.9 kg (240 lb)     Wt Readings from Last 10 Encounters:   08/04/24 97.7 kg (215 lb 4.8 oz)     NUTRITION DIAGNOSIS/PROBLEM:   Moderate Malnutrition related to Chronic - Physiological causes resulting in anorexia or diminished intake  as evidenced by estimated intake less than 75% for at least 1 month, mild muscle and fat loss.     NUTRITION INTERVENTION:     NUTRITION PRESCRIPTION:   Estimated Nutrition needs: --dosing wt of 61 kg - estimated dry weight  Calories: 8342-9487 calories/day (25-30 calories per kg Estimated dry wt)  Protein: 73-92 g protein/day (1.2-1.5 g protein/kg Estimated dry wt)  Fluid Needs: 1 mL/kcal or per MD     - Diet:       Procedures    Cardiac diet Cardiac; Is Patient on Accuchecks? No      - RD Malnutrition Care Plan: Encouraged small frequent meals with emphasis on high calorie/high protein and Monitor need for ONS (oral nutritional supplements)  - Meals and snacks: Encouraged small frequent meals and Encouraged adequate PO intake  - Medical Food Supplements-RD added None at this time and Patient declined. Rational/use of oral  supplements discussed.  - Vitamin and mineral supplements: iron  - Feeding assistance: meal set up  - Nutrition education: Discussed importance of adequate energy and protein intake and discussed sources of iron and use of vit C to help with absorption       - Coordination of nutrition care: collaboration with other providers  - Discharge and transfer of nutrition care to new setting or provider: monitor plans- from home with spouse; pt has 1 child who is moving to [place] shortly     MONITOR AND EVALUATE/NUTRITION GOALS:  - Food and Nutrient Intake:      Monitor: adequacy of PO intake  - Food and Nutrient Administration:      Monitor: N/A  - Anthropometric Measurement:    Monitor weight  - Nutrition Goals:      allow wt loss due to fluid losses, PO greater than 75% of meals, minimize lean body mass loss, prevent skin breakdown, and halt true wt loss    DIETITIAN FOLLOW UP: RD to follow and monitor nutrition status      Brionna Macias RD, LDN  [phone]

## 2024-08-04 NOTE — PLAN OF CARE
Heparin drip, bumex drip continues. Venegas in place- diuresing well. Need stool OB- no BM this shift. Will continue to monitor.    Problem: Patient Centered Care  Goal: Patient preferences are identified and integrated in the patient's plan of care  Description: Interventions:  - What would you like us to know as we care for you? From home with   - Provide timely, complete, and accurate information to patient/family  - Incorporate patient and family knowledge, values, beliefs, and cultural backgrounds into the planning and delivery of care  - Encourage patient/family to participate in care and decision-making at the level they choose  - Honor patient and family perspectives and choices  Outcome: Progressing     Problem: CARDIOVASCULAR - ADULT  Goal: Maintains optimal cardiac output and hemodynamic stability  Description: INTERVENTIONS:  - Monitor vital signs, rhythm, and trends  - Monitor for bleeding, hypotension and signs of decreased cardiac output  - Evaluate effectiveness of vasoactive medications to optimize hemodynamic stability  - Monitor arterial and/or venous puncture sites for bleeding and/or hematoma  - Assess quality of pulses, skin color and temperature  - Assess for signs of decreased coronary artery perfusion - ex. Angina  - Evaluate fluid balance, assess for edema, trend weights  Outcome: Progressing  Goal: Absence of cardiac arrhythmias or at baseline  Description: INTERVENTIONS:  - Continuous cardiac monitoring, monitor vital signs, obtain 12 lead EKG if indicated  - Evaluate effectiveness of antiarrhythmic and heart rate control medications as ordered  - Initiate emergency measures for life threatening arrhythmias  - Monitor electrolytes and administer replacement therapy as ordered  Outcome: Progressing     Problem: SAFETY ADULT - FALL  Goal: Free from fall injury  Description: INTERVENTIONS:  - Assess pt frequently for physical needs  - Identify cognitive and physical deficits and  behaviors that affect risk of falls.  - [place] fall precautions as indicated by assessment.  - Educate pt/family on patient safety including physical limitations  - Instruct pt to call for assistance with activity based on assessment  - Modify environment to reduce risk of injury  - Provide assistive devices as appropriate  - Consider OT/PT consult to assist with strengthening/mobility  - Encourage toileting schedule  Outcome: Progressing     Problem: DISCHARGE PLANNING  Goal: Discharge to home or other facility with appropriate resources  Description: INTERVENTIONS:  - Identify barriers to discharge w/pt and caregiver  - Include patient/family/discharge partner in discharge planning  - Arrange for needed discharge resources and transportation as appropriate  - Identify discharge learning needs (meds, wound care, etc)  - Arrange for interpreters to assist at discharge as needed  - Consider post-discharge preferences of patient/family/discharge partner  - Complete POLST form as appropriate  - Assess patient's ability to be responsible for managing their own health  - Refer to Case Management Department for coordinating discharge planning if the patient needs post-hospital services based on physician/LIP order or complex needs related to functional status, cognitive ability or social support system  Outcome: Progressing

## 2024-08-04 NOTE — PLAN OF CARE
Shubham is AOX4, durbin removed during shift. Received 1unit PRBCs due to low hemoglobin. Ambulated in room. Frequent rounding by staff, call light within reach.     Problem: Patient Centered Care  Goal: Patient preferences are identified and integrated in the patient's plan of care  Description: Interventions:  - What would you like us to know as we care for you? From home with   - Provide timely, complete, and accurate information to patient/family  - Incorporate patient and family knowledge, values, beliefs, and cultural backgrounds into the planning and delivery of care  - Encourage patient/family to participate in care and decision-making at the level they choose  - Honor patient and family perspectives and choices  Outcome: Progressing     Problem: Patient/Family Goals  Goal: Patient/Family Long Term Goal  Description: Patient's Long Term Goal: go home    Interventions:  - follow md orders  - See additional Care Plan goals for specific interventions  Outcome: Progressing  Goal: Patient/Family Short Term Goal  Description: Patient's Short Term Goal: remain safe in hospital    Interventions:   - monitor on tele  - heparin gtt  - See additional Care Plan goals for specific interventions  Outcome: Progressing     Problem: CARDIOVASCULAR - ADULT  Goal: Maintains optimal cardiac output and hemodynamic stability  Description: INTERVENTIONS:  - Monitor vital signs, rhythm, and trends  - Monitor for bleeding, hypotension and signs of decreased cardiac output  - Evaluate effectiveness of vasoactive medications to optimize hemodynamic stability  - Monitor arterial and/or venous puncture sites for bleeding and/or hematoma  - Assess quality of pulses, skin color and temperature  - Assess for signs of decreased coronary artery perfusion - ex. Angina  - Evaluate fluid balance, assess for edema, trend weights  Outcome: Progressing  Goal: Absence of cardiac arrhythmias or at baseline  Description: INTERVENTIONS:  -  Continuous cardiac monitoring, monitor vital signs, obtain 12 lead EKG if indicated  - Evaluate effectiveness of antiarrhythmic and heart rate control medications as ordered  - Initiate emergency measures for life threatening arrhythmias  - Monitor electrolytes and administer replacement therapy as ordered  Outcome: Progressing     Problem: SAFETY ADULT - FALL  Goal: Free from fall injury  Description: INTERVENTIONS:  - Assess pt frequently for physical needs  - Identify cognitive and physical deficits and behaviors that affect risk of falls.  - [place] fall precautions as indicated by assessment.  - Educate pt/family on patient safety including physical limitations  - Instruct pt to call for assistance with activity based on assessment  - Modify environment to reduce risk of injury  - Provide assistive devices as appropriate  - Consider OT/PT consult to assist with strengthening/mobility  - Encourage toileting schedule  Outcome: Progressing     Problem: DISCHARGE PLANNING  Goal: Discharge to home or other facility with appropriate resources  Description: INTERVENTIONS:  - Identify barriers to discharge w/pt and caregiver  - Include patient/family/discharge partner in discharge planning  - Arrange for needed discharge resources and transportation as appropriate  - Identify discharge learning needs (meds, wound care, etc)  - Arrange for interpreters to assist at discharge as needed  - Consider post-discharge preferences of patient/family/discharge partner  - Complete POLST form as appropriate  - Assess patient's ability to be responsible for managing their own health  - Refer to Case Management Department for coordinating discharge planning if the patient needs post-hospital services based on physician/LIP order or complex needs related to functional status, cognitive ability or social support system  Outcome: Progressing

## 2024-08-05 LAB
ANION GAP SERPL CALC-SCNC: 5 MMOL/L (ref 0–18)
APTT PPP: 101.8 SECONDS (ref 23–36)
BASOPHILS # BLD AUTO: 0.04 X10(3) UL (ref 0–0.2)
BASOPHILS NFR BLD AUTO: 0.8 %
BLOOD TYPE BARCODE: 5100
BUN BLD-MCNC: 23 MG/DL (ref 9–23)
BUN/CREAT SERPL: 23.5 (ref 10–20)
CALCIUM BLD-MCNC: 9.1 MG/DL (ref 8.7–10.4)
CHLORIDE SERPL-SCNC: 95 MMOL/L (ref 98–112)
CO2 SERPL-SCNC: 35 MMOL/L (ref 21–32)
CREAT BLD-MCNC: 0.98 MG/DL
DEPRECATED RDW RBC AUTO: 46 FL (ref 35.1–46.3)
EGFRCR SERPLBLD CKD-EPI 2021: 68 ML/MIN/1.73M2 (ref 60–?)
EOSINOPHIL # BLD AUTO: 0.2 X10(3) UL (ref 0–0.7)
EOSINOPHIL NFR BLD AUTO: 4 %
ERYTHROCYTE [DISTWIDTH] IN BLOOD BY AUTOMATED COUNT: 18 % (ref 11–15)
GLUCOSE BLD-MCNC: 88 MG/DL (ref 70–99)
HCT VFR BLD AUTO: 25.3 %
HGB BLD-MCNC: 7.8 G/DL
IMM GRANULOCYTES # BLD AUTO: 0.02 X10(3) UL (ref 0–1)
IMM GRANULOCYTES NFR BLD: 0.4 %
LYMPHOCYTES # BLD AUTO: 1.64 X10(3) UL (ref 1–4)
LYMPHOCYTES NFR BLD AUTO: 32.9 %
MAGNESIUM SERPL-MCNC: 1.4 MG/DL (ref 1.6–2.6)
MCH RBC QN AUTO: 21.6 PG (ref 26–34)
MCHC RBC AUTO-ENTMCNC: 30.8 G/DL (ref 31–37)
MCV RBC AUTO: 70.1 FL
MONOCYTES # BLD AUTO: 0.56 X10(3) UL (ref 0.1–1)
MONOCYTES NFR BLD AUTO: 11.2 %
NEUTROPHILS # BLD AUTO: 2.53 X10 (3) UL (ref 1.5–7.7)
NEUTROPHILS # BLD AUTO: 2.53 X10(3) UL (ref 1.5–7.7)
NEUTROPHILS NFR BLD AUTO: 50.7 %
OSMOLALITY SERPL CALC.SUM OF ELEC: 283 MOSM/KG (ref 275–295)
PLATELET # BLD AUTO: 240 10(3)UL (ref 150–450)
POTASSIUM SERPL-SCNC: 4 MMOL/L (ref 3.5–5.1)
RBC # BLD AUTO: 3.61 X10(6)UL
SODIUM SERPL-SCNC: 135 MMOL/L (ref 136–145)
UNIT VOLUME: 350 ML
WBC # BLD AUTO: 5 X10(3) UL (ref 4–11)

## 2024-08-05 PROCEDURE — 99233 SBSQ HOSP IP/OBS HIGH 50: CPT | Performed by: STUDENT IN AN ORGANIZED HEALTH CARE EDUCATION/TRAINING PROGRAM

## 2024-08-05 RX ORDER — DILTIAZEM HYDROCHLORIDE 120 MG/1
120 CAPSULE, EXTENDED RELEASE ORAL DAILY
Status: DISCONTINUED | OUTPATIENT
Start: 2024-08-05 | End: 2024-08-12

## 2024-08-05 RX ORDER — MAGNESIUM OXIDE 400 MG/1
800 TABLET ORAL ONCE
Status: COMPLETED | OUTPATIENT
Start: 2024-08-05 | End: 2024-08-05

## 2024-08-05 NOTE — PLAN OF CARE
Heparin drip.   Continue bumex drip- diuresing well via purewick.  Will continue to monitor.     Problem: Patient Centered Care  Goal: Patient preferences are identified and integrated in the patient's plan of care  Description: Interventions:  - What would you like us to know as we care for you? From home with   - Provide timely, complete, and accurate information to patient/family  - Incorporate patient and family knowledge, values, beliefs, and cultural backgrounds into the planning and delivery of care  - Encourage patient/family to participate in care and decision-making at the level they choose  - Honor patient and family perspectives and choices  Outcome: Progressing     Problem: CARDIOVASCULAR - ADULT  Goal: Maintains optimal cardiac output and hemodynamic stability  Description: INTERVENTIONS:  - Monitor vital signs, rhythm, and trends  - Monitor for bleeding, hypotension and signs of decreased cardiac output  - Evaluate effectiveness of vasoactive medications to optimize hemodynamic stability  - Monitor arterial and/or venous puncture sites for bleeding and/or hematoma  - Assess quality of pulses, skin color and temperature  - Assess for signs of decreased coronary artery perfusion - ex. Angina  - Evaluate fluid balance, assess for edema, trend weights  Outcome: Progressing  Goal: Absence of cardiac arrhythmias or at baseline  Description: INTERVENTIONS:  - Continuous cardiac monitoring, monitor vital signs, obtain 12 lead EKG if indicated  - Evaluate effectiveness of antiarrhythmic and heart rate control medications as ordered  - Initiate emergency measures for life threatening arrhythmias  - Monitor electrolytes and administer replacement therapy as ordered  Outcome: Progressing     Problem: SAFETY ADULT - FALL  Goal: Free from fall injury  Description: INTERVENTIONS:  - Assess pt frequently for physical needs  - Identify cognitive and physical deficits and behaviors that affect risk of falls.  -  [place] fall precautions as indicated by assessment.  - Educate pt/family on patient safety including physical limitations  - Instruct pt to call for assistance with activity based on assessment  - Modify environment to reduce risk of injury  - Provide assistive devices as appropriate  - Consider OT/PT consult to assist with strengthening/mobility  - Encourage toileting schedule  Outcome: Progressing     Problem: DISCHARGE PLANNING  Goal: Discharge to home or other facility with appropriate resources  Description: INTERVENTIONS:  - Identify barriers to discharge w/pt and caregiver  - Include patient/family/discharge partner in discharge planning  - Arrange for needed discharge resources and transportation as appropriate  - Identify discharge learning needs (meds, wound care, etc)  - Arrange for interpreters to assist at discharge as needed  - Consider post-discharge preferences of patient/family/discharge partner  - Complete POLST form as appropriate  - Assess patient's ability to be responsible for managing their own health  - Refer to Case Management Department for coordinating discharge planning if the patient needs post-hospital services based on physician/LIP order or complex needs related to functional status, cognitive ability or social support system  Outcome: Progressing

## 2024-08-05 NOTE — PROGRESS NOTES
Progress Note  Shubham Zurita Patient Status:  Inpatient    1970 MRN [id number]   Location [facility] 3W/SW Attending Sara Leiva MD   Hosp Day # 7 PCP No primary care provider on file.     SUBJECTIVE:    Still fatigued and with a decreased appetite but is improving. Denies dyspnea or orthopnea. No chest apin.     VITALS:  /60 (BP Location: Right arm)   Pulse 88   Temp 98.1 °F (36.7 °C) (Oral)   Resp 16   Ht 5' 4\" (1.626 m)   Wt 203 lb 6.4 oz (92.3 kg)   SpO2 100%   BMI 34.91 kg/m²     INTAKE/OUTPUT:    Intake/Output Summary (Last 24 hours) at 2024 0945  Last data filed at 2024 0815  Gross per 24 hour   Intake 1201.58 ml   Output 3900 ml   Net -2698.42 ml     Last 3 Weights   24 0104 203 lb 6.4 oz (92.3 kg)   24 0434 215 lb 4.8 oz (97.7 kg)   24 0445 232 lb 8 oz (105.5 kg)   24 0510 241 lb 4.8 oz (109.5 kg)   24 0415 248 lb 3.2 oz (112.6 kg)   24 1049 243 lb 9.6 oz (110.5 kg)   24 0300 248 lb 12.8 oz (112.9 kg)   24 0007 244 lb 1.6 oz (110.7 kg)   24 1835 242 lb 8.1 oz (110 kg)   24 1730 240 lb (108.9 kg)     LABS:  Recent Labs   Lab 24  0717 24  0936 24  0639   GLU 90 108* 88   BUN 25* 25* 23   CREATSERUM 1.07* 1.04* 0.98   EGFRCR 61 63 68   CA 9.1 8.9 9.1    137 135*   K 3.8 3.8 4.0    98 95*   CO2 31.0 34.0* 35.0*     Recent Labs   Lab 24  0717 24  0936 24  1711 24  0639   RBC 3.57* 3.23*  --  3.61*   HGB 7.6* 6.9* 7.8* 7.8*   HCT 25.8* 23.1* 24.9* 25.3*   MCV 72.3* 71.5*  --  70.1*   MCH 21.3* 21.4*  --  21.6*   MCHC 29.5* 29.9*  --  30.8*   RDW 18.4* 18.2*  --  18.0*   NEPRELIM 3.51 2.64  --  2.53   WBC 5.9 4.7  --  5.0   .0 271.0  --  240.0     No results for input(s): \"TROP\", \"CK\" in the last 168 hours.    DIAGNOSTICS:    TELEMETRY: Afib, CVR    ROS: Negative unless noted above     PHYSICAL EXAM:  General: Alert and oriented x 3. No apparent  distress.  HEENT: Normocephalic, sclera are nonicteric. Hearing appropriate bilaterally.  Neck: + JVD. No Carotid bruits. Trachea midline.   Cardiac: IRRegular rate and rhythm. S1, S2 auscultated.   Lungs: Clear without wheezes, rales, rhonchi or dullness. Chest expansion symmetrical. Regular effort.  Abdomen: Soft, non-tender, +BS. No hepatosplenomegaly or appreciable masses.   Extremities: Without clubbing, cyanosis. Peripheral pulses are 1+. BLE edema +4 from feet to mid shin, +2-3 from mid shin to thigh, +1 from thigh to pelvic region. Non-pitting abdomen  Neurologic: Motor and sensory nerves grossly intact.   Psych: Appropriate affect   Skin: Warm and dry. No obvious lesions, wounds, or ulcerations.     MEDICATIONS:   magnesium oxide  800 mg Oral Once    ferrous sulfate  325 mg Oral BID with meals    spironolactone  12.5 mg Oral Daily    methIMAzole  20 mg Oral Daily    tamsulosin  0.4 mg Oral Daily @ 0700    dilTIAZem  30 mg Oral Q8H DC    propranolol  20 mg Oral TID      bumetanide (Bumex) 12.5 mg in 50 mL infusion 1 mg/hr (08/05/24 0816)    continuous dose heparin Stopped (08/05/24 0920)     ASSESSMENT:    Presented with one month of dyspnea, fatigue, and 60lb weight gain.     New Onset Afib, Unclear duration, Now Persistent   - TSH abnormal   - s/p Cardizem gtt, now rate controlled on propranolol and short acting cardizem  - [id number] 2, heparin infusion     Acute HFpEF/ Right Sided HF/ Wide open TR, Mod MR   - , ECHO without RWMA, CXR small left pleural effusion, Albumin 3.2  - Bumex gtt 1mg/hr started 8/1, Also on Aldactone, Net neg 21L, Wt down 45 lbs  - Renal fx stable, Bicarb trending up   - Appears    Urinary Retention   - Urology evaluated, PO Tamulosin, s/p indwelling catheter, diagnosed yesterday now removed and is urinating freely    Thyrotoxicosis w/ Crisis   - Endocrinology following   - Methimazole     Acute Anemia   - Hgb 6.9 yesterday, s/p 1unit PRBCs, now 7.8  - Occult stool neg       Decreased Mobility - Due to fatigue, evaluated by PT, no rehab on discharge  Electrolyte Imbalance- Replacement     PLAN:  - Switch heparin infusion to DOAC and get price check  - Switch cardizem to long acting   - Continue Bumex infusion at 1 mg/hr, she is diuresing well but still significantly overloaded, pressures have been stable may consider increasing to 2 mg/hr,  with uptrending Bicarb/developing contraction alkalosis will give one dose of IV Diamox today   - She is feeling less fatigued and will try to ambulate as much as possible today  - Switch Electrolyte replacement to cardiac     Plan of care discussed with patient and RN.     Chery Garsia, APRN  8/5/2024  9:45 AM  [phone] ([place])  [phone] ([place])        Cardiologist Addendum:  Shubham Zurita was seen and examined independently and I agree with the above documentation provided by FAUSTO Lester.  Heart rate controlled.  Diuresed -22 L since admission.  Bicarbonate starting to climb. Consider diuretic holiday if bicarb continues to climb tomorrow.  Continue diltiazem and propranolol for rate control and anticoagulation for A-fib.    Thank you for allowing me to take part in the care of Shubham Zurita. Please call with any questions of concerns.    L3    Garfield Padron DO  [facility]   Interventional Cardiac and Vascular Services      August 05, 2024  2:15 PM

## 2024-08-05 NOTE — PROGRESS NOTES
Progress Note     Shubham Zurita Patient Status:  Inpatient    1970 MRN [id number]   Location [facility] 3W/SW Attending Sara Leiva MD   Hosp Day # 7 PCP No primary care provider on file.     Subjective:   S:   Seen this morning, no concerns expressed. Now on purewick since indwelling fell out yesterday.   Tolerated prbc transfusion without complications.       Review of Systems:   10 point ROS completed and was negative, except for pertinent positive and negatives stated in subjective.    Objective:   Vital signs:  Temp:  [98.1 °F (36.7 °C)-98.7 °F (37.1 °C)] 98.1 °F (36.7 °C)  Pulse:  [84-99] 88  Resp:  [14-18] 16  BP: (100-117)/(57-66) 108/60  SpO2:  [96 %-100 %] 100 %    Wt Readings from Last 6 Encounters:   24 203 lb 6.4 oz (92.3 kg)         Physical Exam:      General: NAD, +Anasarca  Respiratory: Clear to auscultation bilaterally.  Cardiovascular: RRR  Abdomen: Soft, nontender, nondistended.  Positive bowel sounds. No rebound or guarding.  Neurologic: No focal neurological deficits.   Musculoskeletal: Moves all extremities.  Extremities: LE lymphadematous legs bilaterally     Results:   Diagnostic Data:      Labs:    Labs Last 24 Hours:   BMP     CBC    Other     Na 135 Cl 95 BUN 23 Glu 88   Hb 7.8   .8 Procal -   K 4.0 CO2 35.0 Cr 0.98   WBC 5.0 >< .0  INR - CRP -   Renal Lytes Endo    Hct 25.3   Trop - D dim -   eGFR - Ca 9.1 POC Gluc  -    LFT   pBNP - Lactic -   eGFR AA - PO4 - A1c 5.2   AST - APk - Prot -  LDL -     Mg 1.4 TSH -   ALT - T jagdeep - Alb -        COVID-19 Lab Results    COVID-19  No results found for: \"COVID19\"    Pro-Calcitonin  No results for input(s): \"PCT\" in the last 168 hours.    Cardiac  No results for input(s): \"TROP\", \"PBNP\" in the last 168 hours.    Creatinine Kinase  No results for input(s): \"CK\" in the last 168 hours.    Inflammatory Markers  Recent Labs   Lab 24  1241   JORGE LUIS 19.5*       Imaging: Imaging data reviewed in  Epic.    Medications:    dilTIAZem ER  120 mg Oral Daily    apixaban  5 mg Oral BID    acetaZOLAMIDE (Diamox) 250 mg in sterile water for injection (PF) 2.5 mL IV push  250 mg Intravenous Once    ferrous sulfate  325 mg Oral BID with meals    spironolactone  12.5 mg Oral Daily    methIMAzole  20 mg Oral Daily    tamsulosin  0.4 mg Oral Daily @ 0700    propranolol  20 mg Oral TID       Assessment & Plan:   ASSESSMENT / PLAN:     Atrial fibrillation with rapid ventricular response  Due to hyperthyroidism  - TSH low, started on methimazole  - cards on consult  - off dilt gtt  - cont po propranolol, switch cardizem to long acting   - heparin gtt --> transition to oral anticoag today  -Echo shows ejection fraction is 60 to 65%     HFpEF  - Ejection fraction is 60 to 65%, cannot assess LV dysfunction due to heart rhythm   - switched IV Lasix 40 mg twice daily to Bumex gtt, consider increasing dose   - spironolactone 12.5mg every day added   - Strict I's and O's and daily weights, replete e-lytes per protocol  - cards on consult     Severe hyperthyroidism  Graves' disease    - Admission FT4 high with suppressed TCH  - strong family history of autoimmune thyroid disease   - Was given PTU and dexamethasone in the ED  - Endocrinology consulted  - Swtiched Methimazole 20 mg twice daily --> Methimazole daily  - FT4 normalized  - Pt needs strict endo follow up OP in 2 weeks      Microcytic anemia  - Iron labs reveal ACD  - cont to monitor H/H, transfuse for Hgb <7  - pt never had a pap smear, colonscopy etc. Reports she is mostly a vegetarian due to own taste preference.   - Dietician consulted  - fecal occult negative  - hgb 6.9 8/4 required 1U pRBC transfusion with appropriate response     Urinary Retention  - unclear etiology  - pt endorsing good Bms  - on diuretic, durbin inserted 8/1, spontaneously fell out 8/4  - flomax for now, dc on discharge  - urology consulted     [place], improving  - Cr initially trended up to 1.15 from  0.6 on admission   - in setting of lasix vs retention vs other  - Feurea was intrinsic disease  - monitor diuretic carefully, strict I/os    Deconditioning  - PT, OOB       dvt proph:  DOAC  Code status:    Full     Dispo: anticipate home, pending clinical course       MDM: High   I personally spent time on chart/note review, review of labs/imaging, discussion with patient, physical exam, discussion with staff, consultants, coordinating care, writing progress note, and discussion of plan of care.   Acute illness posing threat to life.  IV medications including heparin and bumex requiring close inpatient monitoring       Sara Leiva MD    Supplementary Documentation:

## 2024-08-05 NOTE — PROGRESS NOTES
Progress Note     Shubham Zurita Patient Status:  Inpatient    1970 MRN [id number]   Location [facility] 3W/SW Attending Sara Leiva MD   Hosp Day # 6 PCP No primary care provider on file.     Subjective:   S: Patient seen this morning, can appreciate that she is losing weight, 27lbs since admission.  Discussed anemia and pt admits to never having a pap smear or Cscope prior. Said she was always healthy so never went to a doctor prior. Stated was eating one one meal per day with very little meat consumption.  Hgb 6.9 this morning, pt agreed to 1U pRBC. Dietician consulted.     Review of Systems:   10 point ROS completed and was negative, except for pertinent positive and negatives stated in subjective.    Objective:   Vital signs:  Temp:  [98.4 °F (36.9 °C)-98.9 °F (37.2 °C)] 98.7 °F (37.1 °C)  Pulse:  [84-99] 87  Resp:  [15-18] 15  BP: (100-117)/(57-67) 106/57  SpO2:  [96 %-100 %] 96 %    Wt Readings from Last 6 Encounters:   24 215 lb 4.8 oz (97.7 kg)         Physical Exam:      General: NAD, +Anasarca  Respiratory: Clear to auscultation bilaterally.  Cardiovascular: RRR  Abdomen: Soft, nontender, nondistended.  Positive bowel sounds. No rebound or guarding.  Neurologic: No focal neurological deficits.   Musculoskeletal: Moves all extremities.  Extremities: LE lymphadematous legs bilaterally     Results:   Diagnostic Data:      Labs:    Labs Last 24 Hours:   BMP     CBC    Other     Na 137 Cl 98 BUN 25 Glu 108   Hb 7.8   PTT 72.3 Procal -   K 3.8 CO2 34.0 Cr 1.04   WBC 4.7 >< .0  INR - CRP -   Renal Lytes Endo    Hct 24.9   Trop - D dim -   eGFR - Ca 8.9 POC Gluc  -    LFT   pBNP - Lactic -   eGFR AA - PO4 - A1c -   AST - APk - Prot -  LDL -     Mg 1.4 TSH -   ALT - T jagdeep - Alb -        COVID-19 Lab Results    COVID-19  No results found for: \"COVID19\"    Pro-Calcitonin  No results for input(s): \"PCT\" in the last 168 hours.    Cardiac  No results for input(s): \"TROP\", \"PBNP\" in the  last 168 hours.    Creatinine Kinase  No results for input(s): \"CK\" in the last 168 hours.    Inflammatory Markers  Recent Labs   Lab 07/31/24  1241   JORGE LUIS 19.5*       Imaging: Imaging data reviewed in Epic.    Medications:    ferrous sulfate  325 mg Oral BID with meals    spironolactone  12.5 mg Oral Daily    methIMAzole  20 mg Oral Daily    tamsulosin  0.4 mg Oral Daily @ 0700    dilTIAZem  30 mg Oral Q8H DC    propranolol  20 mg Oral TID       Assessment & Plan:   ASSESSMENT / PLAN:     Atrial fibrillation with rapid ventricular response  Due to hyperthyroidism  - TSH low, started on methimazole  - cards on consult  - off dilt gtt  - cont po diltiazem, cont po propranolol  - heparin gtt, plan to transition to oral anticoag   -Echo shows ejection fraction is 60 to 65%     HFpEF  - Ejection fraction is 60 to 65%, cannot assess LV dysfunction due to heart rhythm   - switched IV Lasix 40 mg twice daily to Bumex gtt   - spironolactone 12.5mg every day added   - Strict I's and O's and daily weights, replete e-lytes per protocol  - cards on consult     Severe hyperthyroidism  Graves' disease    - Admission FT4 high with suppressed TCH  - strong family history of autoimmune thyroid disease   - Was given PTU and dexamethasone in the ED  - Endocrinology consulted  - Swtiched Methimazole 20 mg twice daily --> Methimazole daily  - FT4 normalized  - Pt needs strict endo follow up OP in 2 weeks      Microcytic anemia  - Iron labs reveal ACD  - cont to monitor H/H, transfuse for Hgb <7  - pt never had a pap smear, colonscopy etc. Reports she is mostly a vegetarian due to own taste preference.   - Dietician consulted  - fecal occult negative  - hgb 6.9 today, 1U pRBC ordered     Urinary Retention  - unclear etiology  - pt endorsing good Bms  - on diuretic, durbin inserted 8/1, spontaneously fell out 8/4  - flomax for now, dc on discharge  - urology consulted     DALTON, improving  - Cr initially trended up to 1.15 from 0.6 on  admission   - in setting of lasix vs retention vs other  - Feurea was intrinsic disease  - monitor diuretic carefully, strict I/os    Deconditioning  - PT       dvt proph:    heparin gtt  Code status:    Full     Dispo: anticipate home, pending clinical course       MDM: High   I personally spent time on chart/note review, review of labs/imaging, discussion with patient, physical exam, discussion with staff, consultants, coordinating care, writing progress note, and discussion of plan of care.   Acute illness posing threat to life.  IV medications including heparin and bumex requiring close inpatient monitoring       Sara Leiva MD    Supplementary Documentation:

## 2024-08-06 LAB
ANION GAP SERPL CALC-SCNC: 3 MMOL/L (ref 0–18)
BASOPHILS # BLD AUTO: 0.05 X10(3) UL (ref 0–0.2)
BASOPHILS NFR BLD AUTO: 0.9 %
BUN BLD-MCNC: 27 MG/DL (ref 9–23)
BUN/CREAT SERPL: 23.1 (ref 10–20)
CALCIUM BLD-MCNC: 9.4 MG/DL (ref 8.7–10.4)
CHLORIDE SERPL-SCNC: 92 MMOL/L (ref 98–112)
CO2 SERPL-SCNC: 39 MMOL/L (ref 21–32)
CREAT BLD-MCNC: 1.17 MG/DL
DEPRECATED RDW RBC AUTO: 46 FL (ref 35.1–46.3)
EGFRCR SERPLBLD CKD-EPI 2021: 55 ML/MIN/1.73M2 (ref 60–?)
EOSINOPHIL # BLD AUTO: 0.24 X10(3) UL (ref 0–0.7)
EOSINOPHIL NFR BLD AUTO: 4.4 %
ERYTHROCYTE [DISTWIDTH] IN BLOOD BY AUTOMATED COUNT: 18.2 % (ref 11–15)
GLUCOSE BLD-MCNC: 92 MG/DL (ref 70–99)
HCT VFR BLD AUTO: 25.8 %
HGB BLD-MCNC: 7.9 G/DL
IMM GRANULOCYTES # BLD AUTO: 0.01 X10(3) UL (ref 0–1)
IMM GRANULOCYTES NFR BLD: 0.2 %
LYMPHOCYTES # BLD AUTO: 1.56 X10(3) UL (ref 1–4)
LYMPHOCYTES NFR BLD AUTO: 28.7 %
MAGNESIUM SERPL-MCNC: 1.6 MG/DL (ref 1.6–2.6)
MCH RBC QN AUTO: 21.7 PG (ref 26–34)
MCHC RBC AUTO-ENTMCNC: 30.6 G/DL (ref 31–37)
MCV RBC AUTO: 70.9 FL
MONOCYTES # BLD AUTO: 0.58 X10(3) UL (ref 0.1–1)
MONOCYTES NFR BLD AUTO: 10.7 %
NEUTROPHILS # BLD AUTO: 3 X10 (3) UL (ref 1.5–7.7)
NEUTROPHILS # BLD AUTO: 3 X10(3) UL (ref 1.5–7.7)
NEUTROPHILS NFR BLD AUTO: 55.1 %
OSMOLALITY SERPL CALC.SUM OF ELEC: 283 MOSM/KG (ref 275–295)
PLATELET # BLD AUTO: 248 10(3)UL (ref 150–450)
POTASSIUM SERPL-SCNC: 4.1 MMOL/L (ref 3.5–5.1)
RBC # BLD AUTO: 3.64 X10(6)UL
SODIUM SERPL-SCNC: 134 MMOL/L (ref 136–145)
WBC # BLD AUTO: 5.4 X10(3) UL (ref 4–11)

## 2024-08-06 PROCEDURE — 99233 SBSQ HOSP IP/OBS HIGH 50: CPT | Performed by: STUDENT IN AN ORGANIZED HEALTH CARE EDUCATION/TRAINING PROGRAM

## 2024-08-06 RX ORDER — MAGNESIUM OXIDE 400 MG/1
400 TABLET ORAL ONCE
Status: COMPLETED | OUTPATIENT
Start: 2024-08-06 | End: 2024-08-06

## 2024-08-06 NOTE — SPIRITUAL CARE NOTE
Spiritual Care Visit Note    Patient Name: Shubham Zurita Date of Spiritual Care Visit: 24   : 1970 Primary Dx: Atrial fibrillation with rapid ventricular response (HCC)       Referred By: Referral From:     Spiritual Care Taxonomy:    Intended Effects: Build relationship of care and support    Methods: Encourage story-telling;Explore spiritual/Yarsanism beliefs;Offer emotional support    Interventions: Acknowledge current situation;Active listening;Ask guided questions    Visit Type/Summary:     - Spiritual Care: Responded to a request for spiritual care and assessed the patient for spiritual care needs. Consulted with RN prior to visit.  remains available as needed for follow up.    Spiritual Care support can be requested via an Epic consult. For urgent/immediate needs, please contact the On Call  at: [place]: ext [phone]          Rev. Ysabel Fernandez

## 2024-08-06 NOTE — PLAN OF CARE
Patient denies pain and discomfort, RA, IV Bumex, Safety measures in place call light within reach.Plan Diuresis  Problem: Patient Centered Care  Goal: Patient preferences are identified and integrated in the patient's plan of care  Description: Interventions:  - What would you like us to know as we care for you? From home with   - Provide timely, complete, and accurate information to patient/family  - Incorporate patient and family knowledge, values, beliefs, and cultural backgrounds into the planning and delivery of care  - Encourage patient/family to participate in care and decision-making at the level they choose  - Honor patient and family perspectives and choices  Outcome: Progressing     Problem: Patient/Family Goals  Goal: Patient/Family Long Term Goal  Description: Patient's Long Term Goal: go home    Interventions:  - follow md orders  - See additional Care Plan goals for specific interventions  Outcome: Progressing  Goal: Patient/Family Short Term Goal  Description: Patient's Short Term Goal: remain safe in hospital    Interventions:   - monitor on tele  - heparin gtt  - See additional Care Plan goals for specific interventions  Outcome: Progressing     Problem: CARDIOVASCULAR - ADULT  Goal: Maintains optimal cardiac output and hemodynamic stability  Description: INTERVENTIONS:  - Monitor vital signs, rhythm, and trends  - Monitor for bleeding, hypotension and signs of decreased cardiac output  - Evaluate effectiveness of vasoactive medications to optimize hemodynamic stability  - Monitor arterial and/or venous puncture sites for bleeding and/or hematoma  - Assess quality of pulses, skin color and temperature  - Assess for signs of decreased coronary artery perfusion - ex. Angina  - Evaluate fluid balance, assess for edema, trend weights  Outcome: Progressing  Goal: Absence of cardiac arrhythmias or at baseline  Description: INTERVENTIONS:  - Continuous cardiac monitoring, monitor vital signs,  obtain 12 lead EKG if indicated  - Evaluate effectiveness of antiarrhythmic and heart rate control medications as ordered  - Initiate emergency measures for life threatening arrhythmias  - Monitor electrolytes and administer replacement therapy as ordered  Outcome: Progressing     Problem: SAFETY ADULT - FALL  Goal: Free from fall injury  Description: INTERVENTIONS:  - Assess pt frequently for physical needs  - Identify cognitive and physical deficits and behaviors that affect risk of falls.  - [place] fall precautions as indicated by assessment.  - Educate pt/family on patient safety including physical limitations  - Instruct pt to call for assistance with activity based on assessment  - Modify environment to reduce risk of injury  - Provide assistive devices as appropriate  - Consider OT/PT consult to assist with strengthening/mobility  - Encourage toileting schedule  Outcome: Progressing     Problem: DISCHARGE PLANNING  Goal: Discharge to home or other facility with appropriate resources  Description: INTERVENTIONS:  - Identify barriers to discharge w/pt and caregiver  - Include patient/family/discharge partner in discharge planning  - Arrange for needed discharge resources and transportation as appropriate  - Identify discharge learning needs (meds, wound care, etc)  - Arrange for interpreters to assist at discharge as needed  - Consider post-discharge preferences of patient/family/discharge partner  - Complete POLST form as appropriate  - Assess patient's ability to be responsible for managing their own health  - Refer to Case Management Department for coordinating discharge planning if the patient needs post-hospital services based on physician/LIP order or complex needs related to functional status, cognitive ability or social support system  Outcome: Progressing

## 2024-08-06 NOTE — CM/SW NOTE
Social work received an update from [facility]'s pharmacy stating that the patient's Eliquis needed a prior auth.    The prior auth was completed     MARLENE RICHARDSON (Key: [id number])  This request has received a Favorable outcome.    Per [facility]'s pharmacy the copay is $0.    RN and Cardiology APRN notified of above.    SW/CM to remain available for support and/or discharge planning.     Romy Kinney MSW, LSW  Discharge Planner [id number]

## 2024-08-06 NOTE — PROGRESS NOTES
Progress Note  Shubham Zurita Patient Status:  Inpatient    1970 MRN [id number]   Location [facility] 3W/SW Attending Sara Leiva MD   Hosp Day # 8 PCP No primary care provider on file.     SUBJECTIVE:    No longer fatigued, no dyspnea or orthopnea, appetite is very good. Worked with therapy yesterday and felt somewhat less weak.     VITALS:  BP 98/61 (BP Location: Left arm)   Pulse 88   Temp 98.2 °F (36.8 °C) (Oral)   Resp 18   Ht 5' 4\" (1.626 m)   Wt 184 lb 9.6 oz (83.7 kg)   SpO2 95%   BMI 31.69 kg/m²     INTAKE/OUTPUT:    Intake/Output Summary (Last 24 hours) at 2024 0828  Last data filed at 2024 0806  Gross per 24 hour   Intake 480 ml   Output 6300 ml   Net -5820 ml     Last 3 Weights   24 0606 184 lb 9.6 oz (83.7 kg)   24 0104 203 lb 6.4 oz (92.3 kg)   24 0434 215 lb 4.8 oz (97.7 kg)   24 0445 232 lb 8 oz (105.5 kg)   24 0510 241 lb 4.8 oz (109.5 kg)   24 0415 248 lb 3.2 oz (112.6 kg)   24 1049 243 lb 9.6 oz (110.5 kg)   24 0300 248 lb 12.8 oz (112.9 kg)   24 0007 244 lb 1.6 oz (110.7 kg)   24 1835 242 lb 8.1 oz (110 kg)   24 1730 240 lb (108.9 kg)     LABS:  Recent Labs   Lab 24  0936 24  0639 24  0644   * 88 92   BUN 25* 23 27*   CREATSERUM 1.04* 0.98 1.17*   EGFRCR 63 68 55*   CA 8.9 9.1 9.4    135* 134*   K 3.8 4.0 4.1   CL 98 95* 92*   CO2 34.0* 35.0* 39.0*     Recent Labs   Lab 24  0936 24  1711 24  0639 24  0644   RBC 3.23*  --  3.61* 3.64*   HGB 6.9* 7.8* 7.8* 7.9*   HCT 23.1* 24.9* 25.3* 25.8*   MCV 71.5*  --  70.1* 70.9*   MCH 21.4*  --  21.6* 21.7*   MCHC 29.9*  --  30.8* 30.6*   RDW 18.2*  --  18.0* 18.2*   NEPRELIM 2.64  --  2.53 3.00   WBC 4.7  --  5.0 5.4   .0  --  240.0 248.0     No results for input(s): \"TROP\", \"CK\" in the last 168 hours.    DIAGNOSTICS:    TELEMETRY: Afib, CVR    ROS: Negative unless noted above     PHYSICAL  EXAM:  General: Alert and oriented x 3. No apparent distress.  HEENT: Normocephalic, sclera are nonicteric. Hearing appropriate bilaterally.  Neck: No JVD. No Carotid bruits. Trachea midline.   Cardiac: IRRegular rate and rhythm. S1, S2 auscultated.   Lungs: Clear without wheezes, rales, rhonchi or dullness. Chest expansion symmetrical. Regular effort.  Abdomen: Soft, non-tender, +BS. No hepatosplenomegaly or appreciable masses.   Extremities: Without clubbing, cyanosis. Peripheral pulses are 1+. BLE edema +3 from feet to mid shin, +2 from mid shin to inferior patella, +1 from superior patella to mid thigh, then non-pitting.  Non-pitting abdomen  Neurologic: Motor and sensory nerves grossly intact.   Psych: Appropriate affect   Skin: Warm and dry. No obvious lesions, wounds, or ulcerations.     MEDICATIONS:   dilTIAZem ER  120 mg Oral Daily    apixaban  5 mg Oral BID    ferrous sulfate  325 mg Oral BID with meals    spironolactone  12.5 mg Oral Daily    methIMAzole  20 mg Oral Daily    tamsulosin  0.4 mg Oral Daily @ 0700    propranolol  20 mg Oral TID      bumetanide (Bumex) 12.5 mg in 50 mL infusion 1 mg/hr (08/05/24 2009)     ASSESSMENT:    Presented with one month of dyspnea, fatigue, and 60lb weight gain.     New Onset Afib, Unclear duration, Now Persistent   - TSH abnormal   - s/p Cardizem gtt, now rate controlled on propranolol and long acting Cardizem  - [id number] 2, s/p heparin infusion now on Eliquis     Acute HFpEF/ Right Sided HF/ Wide open TR, Mod MR   - , ECHO without RWMA, CXR small left pleural effusion, Albumin 3.2  - Bumex gtt 1mg/hr started 8/1, Also on Aldactone, Net neg more than 26L, In comparison to standing wt on 8/1 she is down 64 lbs   - Renal fx worsening, s/p Diamox yesterday, BiCarb still uptrending   - Appears intravascularly compensated but still with LE edema     Urinary Retention   - Urology evaluated, PO Tamulosin, s/p indwelling catheter, removed now and is urinating  freely    Thyrotoxicosis w/ Crisis   - Endocrinology following   - Methimazole     Acute Anemia   - Hgb 6.9 yesterday, s/p 1unit PRBCs, now 7.9  - Occult stool neg      Decreased Mobility - Due to fatigue, evaluated by PT, no rehab on discharge  Electrolyte Imbalance-  Cardiac Replacement     PLAN:  -  DOAC price check pending   - Hold Bumex infusion with worsening renal function, diuretics holiday, BMP in morning   - No longer with JVD, Wt down 60 pounds, still with LE edema although may not be able to completely resolve with with diuretics, will work with therapy again today and increase activity     Plan of care discussed with patient and RN.     Chery Garsia, APRN  8/6/2024  8:26 AM  [phone] ([place])  [phone] ([place])      Cardiologist Addendum:  Shubham Zurita was seen and examined independently and I agree with the above documentation provided by FAUSTO Lester.  Pt feels well, negative 26L after diuresis. Still has significant bilateral LE edema. Pt is has intravascular volume contraction. Vitals are stable. Agree with diuretic holiday to give time to mobilize extravascular fluid. Needs compression stockings but has LE wounds, recommend tubigrips if she can tolerate.     Thank you for allowing me to take part in the care of Shubham Zurita. Please call with any questions of concerns.    L3    Garfield Padron,   [facility]   Interventional Cardiac and Vascular Services      August 06, 2024  1:54 PM

## 2024-08-06 NOTE — PROGRESS NOTES
Progress Note     Shubham Zurita Patient Status:  Inpatient    1970 MRN [id number]   Location [facility] 3W/SW Attending Sara Leiva MD   Hosp Day # 8 PCP No primary care provider on file.     Subjective:   S: Pt seen this morning, sleepy. since didn't get much sleep overnight. Has been trying to move around and ambulate in room. At baseline does all ADLs at home.   Cardiology to hold diuretic in setting of worsening renal function.    Review of Systems:   10 point ROS completed and was negative, except for pertinent positive and negatives stated in subjective.    Objective:   Vital signs:  Temp:  [98 °F (36.7 °C)-98.4 °F (36.9 °C)] 98.4 °F (36.9 °C)  Pulse:  [76-88] 83  Resp:  [18] 18  BP: ()/(49-71) 95/54  SpO2:  [95 %-100 %] 96 %    Wt Readings from Last 6 Encounters:   24 184 lb 9.6 oz (83.7 kg)         Physical Exam:      General: NAD, +Anasarca  Respiratory: Clear to auscultation bilaterally.  Cardiovascular: RRR  Abdomen: Soft, nontender, nondistended.  Positive bowel sounds. No rebound or guarding.  Neurologic: No focal neurological deficits.   Musculoskeletal: Moves all extremities.  Extremities: LE lymphadematous legs bilaterally     Results:   Diagnostic Data:      Labs:    Labs Last 24 Hours:   BMP     CBC    Other     Na 134 Cl 92 BUN 27 Glu 92   Hb 7.9   PTT - Procal -   K 4.1 CO2 39.0 Cr 1.17   WBC 5.4 >< .0  INR - CRP -   Renal Lytes Endo    Hct 25.8   Trop - D dim -   eGFR - Ca 9.4 POC Gluc  -    LFT   pBNP - Lactic -   eGFR AA - PO4 - A1c -   AST - APk - Prot -  LDL -     Mg 1.6 TSH -   ALT - T jagdeep - Alb -        COVID-19 Lab Results    COVID-19  No results found for: \"COVID19\"    Pro-Calcitonin  No results for input(s): \"PCT\" in the last 168 hours.    Cardiac  No results for input(s): \"TROP\", \"PBNP\" in the last 168 hours.    Creatinine Kinase  No results for input(s): \"CK\" in the last 168 hours.    Inflammatory Markers  Recent Labs   Lab 24  1241   JORGE LUIS  19.5*       Imaging: Imaging data reviewed in Epic.    Medications:    dilTIAZem ER  120 mg Oral Daily    apixaban  5 mg Oral BID    ferrous sulfate  325 mg Oral BID with meals    [Held by provider] spironolactone  12.5 mg Oral Daily    methIMAzole  20 mg Oral Daily    tamsulosin  0.4 mg Oral Daily @ 0700    propranolol  20 mg Oral TID       Assessment & Plan:   ASSESSMENT / PLAN:     Atrial fibrillation with rapid ventricular response  Due to hyperthyroidism  - TSH low, started on methimazole  - cards on consult  - off dilt gtt  - cont po propranolol, switch cardizem to long acting   - heparin gtt --> oral anticoag Eliquis  -Echo shows ejection fraction is 60 to 65%     HFpEF  - Ejection fraction is 60 to 65%, cannot assess LV dysfunction due to heart rhythm   - switched IV Lasix 40 mg twice daily to Bumex gtt, holding  - spironolactone 12.5mg every day added   - Strict I's and O's and daily weights, replete e-lytes per protocol  - cards on consult     Severe hyperthyroidism  Graves' disease    - Admission FT4 high with suppressed TCH  - strong family history of autoimmune thyroid disease   - Was given PTU and dexamethasone in the ED  - Endocrinology consulted  - Swtiched Methimazole 20 mg twice daily --> Methimazole daily  - FT4 normalized  - Pt needs strict endo follow up OP in 2 weeks      Microcytic anemia  - Iron labs reveal ACD  - cont to monitor H/H, transfuse for Hgb <7  - pt never had a pap smear, colonscopy etc. Reports she is mostly a vegetarian due to own taste preference.   - Dietician consulted  - fecal occult negative  - hgb 6.9 8/4 required 1U pRBC transfusion with appropriate response     Urinary Retention  - unclear etiology  - pt endorsing good Bms  - on diuretic, durbin inserted 8/1, spontaneously fell out 8/4  - flomax for now, dc on discharge  - urology consulted     DALTON, improving  - Cr initially trended up to 1.15 from 0.6 on admission   - in setting of lasix vs retention vs other  - Feurea  was intrinsic disease  - monitor diuretic carefully, strict I/os    Deconditioning  - PT, OOB       dvt proph:  DOAC  Code status:    Full     Dispo: anticipate home, pending clinical course       MDM: High   I personally spent time on chart/note review, review of labs/imaging, discussion with patient, physical exam, discussion with staff, consultants, coordinating care, writing progress note, and discussion of plan of care.   Acute illness posing threat to life.  IV medications requiring close inpatient monitoring       Sara Leiva MD    Supplementary Documentation:

## 2024-08-06 NOTE — PLAN OF CARE
Problem: Patient Centered Care  Goal: Patient preferences are identified and integrated in the patient's plan of care  Description: Interventions:  - What would you like us to know as we care for you? From home with   - Provide timely, complete, and accurate information to patient/family  - Incorporate patient and family knowledge, values, beliefs, and cultural backgrounds into the planning and delivery of care  - Encourage patient/family to participate in care and decision-making at the level they choose  - Honor patient and family perspectives and choices  Outcome: Progressing     Problem: Patient/Family Goals  Goal: Patient/Family Long Term Goal  Description: Patient's Long Term Goal: go home    Interventions:  - follow md orders  - See additional Care Plan goals for specific interventions  Outcome: Progressing  Goal: Patient/Family Short Term Goal  Description: Patient's Short Term Goal: remain safe in hospital    Interventions:   - monitor on tele  - heparin gtt  - See additional Care Plan goals for specific interventions  Outcome: Progressing     Problem: CARDIOVASCULAR - ADULT  Goal: Maintains optimal cardiac output and hemodynamic stability  Description: INTERVENTIONS:  - Monitor vital signs, rhythm, and trends  - Monitor for bleeding, hypotension and signs of decreased cardiac output  - Evaluate effectiveness of vasoactive medications to optimize hemodynamic stability  - Monitor arterial and/or venous puncture sites for bleeding and/or hematoma  - Assess quality of pulses, skin color and temperature  - Assess for signs of decreased coronary artery perfusion - ex. Angina  - Evaluate fluid balance, assess for edema, trend weights  Outcome: Progressing  Goal: Absence of cardiac arrhythmias or at baseline  Description: INTERVENTIONS:  - Continuous cardiac monitoring, monitor vital signs, obtain 12 lead EKG if indicated  - Evaluate effectiveness of antiarrhythmic and heart rate control medications as  ordered  - Initiate emergency measures for life threatening arrhythmias  - Monitor electrolytes and administer replacement therapy as ordered  Outcome: Progressing     Problem: SAFETY ADULT - FALL  Goal: Free from fall injury  Description: INTERVENTIONS:  - Assess pt frequently for physical needs  - Identify cognitive and physical deficits and behaviors that affect risk of falls.  - [place] fall precautions as indicated by assessment.  - Educate pt/family on patient safety including physical limitations  - Instruct pt to call for assistance with activity based on assessment  - Modify environment to reduce risk of injury  - Provide assistive devices as appropriate  - Consider OT/PT consult to assist with strengthening/mobility  - Encourage toileting schedule  Outcome: Progressing     Problem: DISCHARGE PLANNING  Goal: Discharge to home or other facility with appropriate resources  Description: INTERVENTIONS:  - Identify barriers to discharge w/pt and caregiver  - Include patient/family/discharge partner in discharge planning  - Arrange for needed discharge resources and transportation as appropriate  - Identify discharge learning needs (meds, wound care, etc)  - Arrange for interpreters to assist at discharge as needed  - Consider post-discharge preferences of patient/family/discharge partner  - Complete POLST form as appropriate  - Assess patient's ability to be responsible for managing their own health  - Refer to Case Management Department for coordinating discharge planning if the patient needs post-hospital services based on physician/LIP order or complex needs related to functional status, cognitive ability or social support system  Outcome: Progressing     Patient is alert and orientated x 4. Patient is on RA. Bumex drip on hold.

## 2024-08-06 NOTE — PHYSICAL THERAPY NOTE
PHYSICAL THERAPY TREATMENT NOTE - INPATIENT     Room Number: 348/348-A       Presenting Problem: graves dz, afib, weight gain       Problem List  Principal Problem:    Atrial fibrillation with rapid ventricular response (HCC)  Active Problems:    Thyrotoxicosis with thyrotoxic crisis, unspecified thyrotoxicosis type    Acute congestive heart failure, unspecified heart failure type (HCC)    Cardiomyopathy, unspecified type (HCC)    Anemia, unspecified type    Acute heart failure (HCC)    Graves disease    Graves' orbitopathy      PHYSICAL THERAPY ASSESSMENT   Patient demonstrates fair progress this session, goals  remain in progress.      Patient is requiring stand-by assist as a result of the following impairments: decreased functional strength, decreased endurance/aerobic capacity, and medical status.     Patient continues to function below baseline with bed mobility, transfers, gait, and stair negotiation.  Next session anticipate patient to progress transfers, gait, stair negotiation, and standing prolonged periods.  Physical Therapy will continue to follow patient for duration of hospitalization.    Patient continues to benefit from continued skilled PT services: at discharge to promote prior level of function.  Anticipate patient will return home with home health PT vs no needs, pt anticipated to decline home care services but would benefit from additional endurance and mobility training.     PLAN  PT Treatment Plan: Bed mobility;Body mechanics;Energy conservation;Endurance;Patient education;Family education;Gait training;Strengthening;Stair training;Transfer training;Balance training  Frequency (Obs): 3-5x/week    SUBJECTIVE  \"I do the stairs slowly.\"     OBJECTIVE  Precautions:  (fluid retention BLEs)    WEIGHT BEARING RESTRICTION      No restrictions           PAIN ASSESSMENT   Ratin  Location: denies pain       BALANCE  Static Sitting: Good  Dynamic Sitting: Fair +  Static Standing: Fair  Dynamic  Standing: Fair    AM-PAC '6-Clicks' INPATIENT SHORT FORM - BASIC MOBILITY  How much difficulty does the patient currently have...  Patient Difficulty: Turning over in bed (including adjusting bedclothes, sheets and blankets)?: None   Patient Difficulty: Sitting down on and standing up from a chair with arms (e.g., wheelchair, bedside commode, etc.): A Little   Patient Difficulty: Moving from lying on back to sitting on the side of the bed?: A Little   How much help from another person does the patient currently need...   Help from Another: Moving to and from a bed to a chair (including a wheelchair)?: A Little   Help from Another: Need to walk in hospital room?: A Little   Help from Another: Climbing 3-5 steps with a railing?: A Little     AM-PAC Score:  Raw Score: 19   Approx Degree of Impairment: 41.77%   Standardized Score (AM-PAC Scale): 45.44   CMS Modifier (G-Code): CK    FUNCTIONAL ABILITY STATUS  Functional Mobility/Gait Assessment  Gait Assistance: Contact guard assist  Distance (ft): 15', 45'  Assistive Device: None (firmly opposed to DME education, expressing preference for furniure walking)  Pattern: Compensated trendelenburg  Rolling: stand-by assist  Supine to Sit:  NT  Sit to Supine: moderate assist  Sit to Stand: stand-by assist from EOB and commode    The patient's Approx Degree of Impairment: 41.77% has been calculated based on documentation in the [facility] '6 clicks' Inpatient Daily Activity Short Form.  Research supports that patients with this level of impairment may benefit from home with home care.  Final disposition will be made by interdisciplinary medical team.    Patient End of Session: In bed;Needs met;Call light within reach;RN aware of session/findings;All patient questions and concerns addressed    CURRENT GOALS   Goals to be met by: 8/9/24  Patient Goal Patient's self-stated goal is: less fluid, improved mobility   Goal #1 Patient is able to demonstrate supine - sit EOB @ level: modified  independent      Goal #1   Current Status     Goal #2 Patient is able to demonstrate transfers Sit to/from Stand at assistance level: modified independent with none      Goal #2  Current Status     Goal #3 Patient is able to ambulate 150 feet with assist device: none at assistance level: supervision   Goal #3   Current Status     Goal #4 Patient will negotiate 14 stairs with rail and supervision   Goal #4   Current Status     Goal #5 Patient to demonstrate independence with home activity/exercise instructions provided to patient in preparation for discharge.   Goal #5   Current Status     Goal #6     Goal #6  Current Status       Gait Training: 15 minutes  Therapeutic Activity: 9 minutes

## 2024-08-07 LAB
ANION GAP SERPL CALC-SCNC: 3 MMOL/L (ref 0–18)
BASOPHILS # BLD AUTO: 0.05 X10(3) UL (ref 0–0.2)
BASOPHILS NFR BLD AUTO: 1 %
BUN BLD-MCNC: 28 MG/DL (ref 9–23)
BUN/CREAT SERPL: 25 (ref 10–20)
CALCIUM BLD-MCNC: 9.2 MG/DL (ref 8.7–10.4)
CHLORIDE SERPL-SCNC: 93 MMOL/L (ref 98–112)
CO2 SERPL-SCNC: 38 MMOL/L (ref 21–32)
CREAT BLD-MCNC: 1.12 MG/DL
DEPRECATED RDW RBC AUTO: 47.1 FL (ref 35.1–46.3)
EGFRCR SERPLBLD CKD-EPI 2021: 58 ML/MIN/1.73M2 (ref 60–?)
EOSINOPHIL # BLD AUTO: 0.29 X10(3) UL (ref 0–0.7)
EOSINOPHIL NFR BLD AUTO: 5.9 %
ERYTHROCYTE [DISTWIDTH] IN BLOOD BY AUTOMATED COUNT: 18.4 % (ref 11–15)
GLUCOSE BLD-MCNC: 85 MG/DL (ref 70–99)
HCT VFR BLD AUTO: 24.1 %
HGB BLD-MCNC: 7.6 G/DL
IMM GRANULOCYTES # BLD AUTO: 0.01 X10(3) UL (ref 0–1)
IMM GRANULOCYTES NFR BLD: 0.2 %
LYMPHOCYTES # BLD AUTO: 1.62 X10(3) UL (ref 1–4)
LYMPHOCYTES NFR BLD AUTO: 33.1 %
MAGNESIUM SERPL-MCNC: 1.7 MG/DL (ref 1.6–2.6)
MCH RBC QN AUTO: 22.5 PG (ref 26–34)
MCHC RBC AUTO-ENTMCNC: 31.5 G/DL (ref 31–37)
MCV RBC AUTO: 71.3 FL
MONOCYTES # BLD AUTO: 0.5 X10(3) UL (ref 0.1–1)
MONOCYTES NFR BLD AUTO: 10.2 %
NEUTROPHILS # BLD AUTO: 2.42 X10 (3) UL (ref 1.5–7.7)
NEUTROPHILS # BLD AUTO: 2.42 X10(3) UL (ref 1.5–7.7)
NEUTROPHILS NFR BLD AUTO: 49.6 %
OSMOLALITY SERPL CALC.SUM OF ELEC: 283 MOSM/KG (ref 275–295)
PLATELET # BLD AUTO: 235 10(3)UL (ref 150–450)
POTASSIUM SERPL-SCNC: 4.3 MMOL/L (ref 3.5–5.1)
RBC # BLD AUTO: 3.38 X10(6)UL
SODIUM SERPL-SCNC: 134 MMOL/L (ref 136–145)
WBC # BLD AUTO: 4.9 X10(3) UL (ref 4–11)

## 2024-08-07 PROCEDURE — 99233 SBSQ HOSP IP/OBS HIGH 50: CPT | Performed by: STUDENT IN AN ORGANIZED HEALTH CARE EDUCATION/TRAINING PROGRAM

## 2024-08-07 RX ORDER — MIDODRINE HYDROCHLORIDE 5 MG/1
5 TABLET ORAL 3 TIMES DAILY
Status: DISCONTINUED | OUTPATIENT
Start: 2024-08-07 | End: 2024-08-11

## 2024-08-07 RX ORDER — PROPRANOLOL HYDROCHLORIDE 10 MG/1
30 TABLET ORAL 3 TIMES DAILY
Status: DISCONTINUED | OUTPATIENT
Start: 2024-08-07 | End: 2024-08-13

## 2024-08-07 RX ORDER — DIGOXIN 125 MCG
125 TABLET ORAL EVERY OTHER DAY
Status: DISCONTINUED | OUTPATIENT
Start: 2024-08-07 | End: 2024-08-12

## 2024-08-07 RX ORDER — MAGNESIUM OXIDE 400 MG/1
400 TABLET ORAL ONCE
Status: COMPLETED | OUTPATIENT
Start: 2024-08-07 | End: 2024-08-07

## 2024-08-07 NOTE — PLAN OF CARE
Problem: Patient Centered Care  Goal: Patient preferences are identified and integrated in the patient's plan of care  Description: Interventions:  - What would you like us to know as we care for you? From home with   - Provide timely, complete, and accurate information to patient/family  - Incorporate patient and family knowledge, values, beliefs, and cultural backgrounds into the planning and delivery of care  - Encourage patient/family to participate in care and decision-making at the level they choose  - Honor patient and family perspectives and choices  Outcome: Progressing     Problem: Patient/Family Goals  Goal: Patient/Family Long Term Goal  Description: Patient's Long Term Goal: go home    Interventions:  - follow md orders  - See additional Care Plan goals for specific interventions  Outcome: Progressing  Goal: Patient/Family Short Term Goal  Description: Patient's Short Term Goal: remain safe in hospital    Interventions:   - monitor on tele  - heparin gtt  - See additional Care Plan goals for specific interventions  Outcome: Progressing     Problem: CARDIOVASCULAR - ADULT  Goal: Maintains optimal cardiac output and hemodynamic stability  Description: INTERVENTIONS:  - Monitor vital signs, rhythm, and trends  - Monitor for bleeding, hypotension and signs of decreased cardiac output  - Evaluate effectiveness of vasoactive medications to optimize hemodynamic stability  - Monitor arterial and/or venous puncture sites for bleeding and/or hematoma  - Assess quality of pulses, skin color and temperature  - Assess for signs of decreased coronary artery perfusion - ex. Angina  - Evaluate fluid balance, assess for edema, trend weights  Outcome: Progressing  Goal: Absence of cardiac arrhythmias or at baseline  Description: INTERVENTIONS:  - Continuous cardiac monitoring, monitor vital signs, obtain 12 lead EKG if indicated  - Evaluate effectiveness of antiarrhythmic and heart rate control medications as  ordered  - Initiate emergency measures for life threatening arrhythmias  - Monitor electrolytes and administer replacement therapy as ordered  Outcome: Progressing     Problem: SAFETY ADULT - FALL  Goal: Free from fall injury  Description: INTERVENTIONS:  - Assess pt frequently for physical needs  - Identify cognitive and physical deficits and behaviors that affect risk of falls.  - [place] fall precautions as indicated by assessment.  - Educate pt/family on patient safety including physical limitations  - Instruct pt to call for assistance with activity based on assessment  - Modify environment to reduce risk of injury  - Provide assistive devices as appropriate  - Consider OT/PT consult to assist with strengthening/mobility  - Encourage toileting schedule  Outcome: Progressing     Problem: DISCHARGE PLANNING  Goal: Discharge to home or other facility with appropriate resources  Description: INTERVENTIONS:  - Identify barriers to discharge w/pt and caregiver  - Include patient/family/discharge partner in discharge planning  - Arrange for needed discharge resources and transportation as appropriate  - Identify discharge learning needs (meds, wound care, etc)  - Arrange for interpreters to assist at discharge as needed  - Consider post-discharge preferences of patient/family/discharge partner  - Complete POLST form as appropriate  - Assess patient's ability to be responsible for managing their own health  - Refer to Case Management Department for coordinating discharge planning if the patient needs post-hospital services based on physician/LIP order or complex needs related to functional status, cognitive ability or social support system  Outcome: Progressing     Patient is alert and orientated x 4. Patient is on RA. Bumex drip on hold per orders.

## 2024-08-07 NOTE — PLAN OF CARE
Problem: CARDIOVASCULAR - ADULT  Goal: Maintains optimal cardiac output and hemodynamic stability  Description: INTERVENTIONS:  - Monitor vital signs, rhythm, and trends  - Monitor for bleeding, hypotension and signs of decreased cardiac output  - Evaluate effectiveness of vasoactive medications to optimize hemodynamic stability  - Monitor arterial and/or venous puncture sites for bleeding and/or hematoma  - Assess quality of pulses, skin color and temperature  - Assess for signs of decreased coronary artery perfusion - ex. Angina  - Evaluate fluid balance, assess for edema, trend weights  Outcome: Progressing  Goal: Absence of cardiac arrhythmias or at baseline  Description: INTERVENTIONS:  - Continuous cardiac monitoring, monitor vital signs, obtain 12 lead EKG if indicated  - Evaluate effectiveness of antiarrhythmic and heart rate control medications as ordered  - Initiate emergency measures for life threatening arrhythmias  - Monitor electrolytes and administer replacement therapy as ordered  Outcome: Progressing     Problem: SAFETY ADULT - FALL  Goal: Free from fall injury  Description: INTERVENTIONS:  - Assess pt frequently for physical needs  - Identify cognitive and physical deficits and behaviors that affect risk of falls.  - [place] fall precautions as indicated by assessment.  - Educate pt/family on patient safety including physical limitations  - Instruct pt to call for assistance with activity based on assessment  - Modify environment to reduce risk of injury  - Provide assistive devices as appropriate  - Consider OT/PT consult to assist with strengthening/mobility  - Encourage toileting schedule  Outcome: Progressing     Problem: DISCHARGE PLANNING  Goal: Discharge to home or other facility with appropriate resources  Description: INTERVENTIONS:  - Identify barriers to discharge w/pt and caregiver  - Include patient/family/discharge partner in discharge planning  - Arrange for needed discharge  resources and transportation as appropriate  - Identify discharge learning needs (meds, wound care, etc)  - Arrange for interpreters to assist at discharge as needed  - Consider post-discharge preferences of patient/family/discharge partner  - Complete POLST form as appropriate  - Assess patient's ability to be responsible for managing their own health  - Refer to Case Management Department for coordinating discharge planning if the patient needs post-hospital services based on physician/LIP order or complex needs related to functional status, cognitive ability or social support system  Outcome: Progressing

## 2024-08-07 NOTE — CONSULTS
[facility]                                                CARDIAC EP CONSULT NOTE      Patient Name: Shubham Zurita MRN: [id number]   : 1970 CSN: [id number]     CARDIAC EP CONSULT NOTE    Reason for consultation:   Asked by general cardiology Dr Padron and Dr Leiva to provide evaluation and management of atrial fibrillation.    Assessment and Recommendations:     Atrial fibrillation with rapid ventricular response  In the setting of acute HF and thyrotoxicosis  Relative hypotension    RECS:  -Start midodrine 5 mg PO BID  -Increase propranolol to 30 mg PO TID  -Continue diltiazem  mg once daily  -Start digoxin 0.125 mg every other day  -Do not make additional increases to rate agents for at least 48 hours  -Continue Eliquis    Thank you for the EP consultation.    Dwain Kellogg MD  Cardiac EP  [facility]  2024  C5-EP      History of present illness:   The patient is a 54 year old with newly discovered atrial fibrillation with rapid rates, thyrotoxicosis, and acute CHF with normal EF but severe valve disease.  She has continued to be tachycardic, with limitations to rate agents due to relative hypotension.  She currently feels well, does not have much dyspnea, no palpitations, edema improved.    ROS:   Constitutional: No fevers, chills, fatigue or night sweats.  ENT: No mouth pain, neck pain, running nose, headaches or swollen glands.  Skin: No rashes, pruritus or skin changes,  Respiratory: No cough, wheezing or shortness of breath.  CV: No chest pain or claudication.  Musculoskeletal: No joint pain, stiffness or swelling.  : No dysuria or hematuria.  GI: No nausea, vomiting or diarrhea. No blood in stools.  Neurologic: No seizures, tremors, weakness or numbness.    Past Medical, Surgical, Family, and Social Histories:   HFpEF  Severe TR  AF  Graves disease    FHX:  No premature  CAD/SCA    SHX:  The patient reports that she has never smoked. She has never used smokeless tobacco. She reports that she does not drink alcohol and does not use drugs.    Allergies:   No Known Allergies    Medications:      dilTIAZem ER  120 mg Oral Daily    apixaban  5 mg Oral BID    ferrous sulfate  325 mg Oral BID with meals    [Held by provider] spironolactone  12.5 mg Oral Daily    methIMAzole  20 mg Oral Daily    tamsulosin  0.4 mg Oral Daily @ 0700    propranolol  20 mg Oral TID      [Held by provider] bumetanide (Bumex) 12.5 mg in 50 mL infusion Stopped (08/06/24 0829)       PHYSICAL EXAM:   Blood pressure 100/60, pulse 82, temperature 98.3 °F (36.8 °C), temperature source Oral, resp. rate 18, height 162.6 cm (5' 4\"), weight 181 lb 4.8 oz (82.2 kg), SpO2 98%.  GEN - no distress  HEENT - normal conjunctiva, moist mucosa  Neck - supple, no LAD  Lungs - nonlabored, clear bilaterally  Heart - JVP 14 cm H2O, carotids normal; irreg irreg, nl S1/S2; + edema  Abd - soft, nontender  Ext - arthritic changes  Neuro - A+Ox3, no facial droop or gross deficits  Psych - cooperative, calm    LABS AND DATA:     Lab Results   Component Value Date    WBC 4.9 08/07/2024    HGB 7.6 (L) 08/07/2024    HCT 24.1 (L) 08/07/2024    .0 08/07/2024    CREATSERUM 1.12 (H) 08/07/2024    BUN 28 (H) 08/07/2024     (L) 08/07/2024    K 4.3 08/07/2024    CL 93 (L) 08/07/2024    CO2 38.0 (H) 08/07/2024    GLU 85 08/07/2024    CA 9.2 08/07/2024    ALB 3.2 07/29/2024    ALKPHO 110 (H) 07/29/2024    BILT 0.9 07/29/2024    TP 8.2 07/29/2024    AST 23 07/29/2024    ALT <7 (L) 07/29/2024    .8 (H) 08/05/2024    INR 1.40 (H) 07/29/2024    T4F 1.3 08/02/2024    TSH <0.008 (L) 07/29/2024    MG 1.7 08/07/2024         ------------------------------------------------------------------------------------------------------------------------------

## 2024-08-07 NOTE — PROGRESS NOTES
Progress Note     Shubham Zurita Patient Status:  Inpatient    1970 MRN [id number]   Location [facility] 3W/SW Attending Sara Lieva MD   Hosp Day # 9 PCP No primary care provider on file.     Subjective:   S: Pt seen this morning, continues to be sleepy.  No other complaints.    Review of Systems:   10 point ROS completed and was negative, except for pertinent positive and negatives stated in subjective.    Objective:   Vital signs:  Temp:  [98.1 °F (36.7 °C)-98.6 °F (37 °C)] 98.2 °F (36.8 °C)  Pulse:  [84-96] 96  Resp:  [16-18] 18  BP: ()/(51-64) 111/55  SpO2:  [95 %-99 %] 96 %    Wt Readings from Last 6 Encounters:   24 181 lb 4.8 oz (82.2 kg)         Physical Exam:      General: NAD, +Anasarca  Respiratory: Clear to auscultation bilaterally.  Cardiovascular: RRR  Abdomen: Soft, nontender, nondistended.  Positive bowel sounds. No rebound or guarding.  Neurologic: No focal neurological deficits.   Musculoskeletal: Moves all extremities.  Extremities: LE lymphadematous legs bilaterally     Results:   Diagnostic Data:      Labs:    Labs Last 24 Hours:   BMP     CBC    Other     Na 134 Cl 93 BUN 28 Glu 85   Hb 7.6   PTT - Procal -   K 4.3 CO2 38.0 Cr 1.12   WBC 4.9 >< .0  INR - CRP -   Renal Lytes Endo    Hct 24.1   Trop - D dim -   eGFR - Ca 9.2 POC Gluc  -    LFT   pBNP - Lactic -   eGFR AA - PO4 - A1c -   AST - APk - Prot -  LDL -     Mg 1.7 TSH -   ALT - T jagdeep - Alb -        COVID-19 Lab Results    COVID-19  No results found for: \"COVID19\"    Pro-Calcitonin  No results for input(s): \"PCT\" in the last 168 hours.    Cardiac  No results for input(s): \"TROP\", \"PBNP\" in the last 168 hours.    Creatinine Kinase  No results for input(s): \"CK\" in the last 168 hours.    Inflammatory Markers  Recent Labs   Lab 24  1241   JORGE LUIS 19.5*       Imaging: Imaging data reviewed in Epic.    Medications:    dilTIAZem ER  120 mg Oral Daily    apixaban  5 mg Oral BID    ferrous sulfate  325  mg Oral BID with meals    [Held by provider] spironolactone  12.5 mg Oral Daily    methIMAzole  20 mg Oral Daily    tamsulosin  0.4 mg Oral Daily @ 0700    propranolol  20 mg Oral TID       Assessment & Plan:   ASSESSMENT / PLAN:     Atrial fibrillation with rapid ventricular response  Due to hyperthyroidism  - TSH low, started on methimazole  - cards on consult  - off dilt gtt  - cont po propranolol, switch cardizem to long acting   - heparin gtt --> oral anticoag Eliquis  -Echo shows ejection fraction is 60 to 65%     HFpEF  - Ejection fraction is 60 to 65%, cannot assess LV dysfunction due to heart rhythm   - switched IV Lasix 40 mg twice daily to Bumex gtt, holding  - cont spironolactone 12.5mg every day  - Strict I's and O's and daily weights, replete e-lytes per protocol  - cards on consult     Severe hyperthyroidism  Graves' disease    - Admission FT4 high with suppressed TCH  - strong family history of autoimmune thyroid disease   - Was given PTU and dexamethasone in the ED  - Endocrinology consulted  - Swtiched Methimazole 20 mg twice daily --> Methimazole daily  - FT4 normalized  - Pt needs strict endo follow up OP in 2 weeks      Microcytic anemia  - Iron labs reveal ACD  - cont to monitor H/H, transfuse for Hgb <7  - pt never had a pap smear, colonscopy etc. Reports she is mostly a vegetarian due to own taste preference.   - Dietician consulted  - fecal occult negative  - hgb 6.9 8/4 required 1U pRBC transfusion with appropriate response     Urinary Retention  - unclear etiology  - pt endorsing good Bms  - on diuretic, durbin inserted 8/1, spontaneously fell out 8/4  - flomax for now, dc on discharge  - urology consulted     [place]   - Cr initially trended up to 1.15 from 0.6 on admission   - in setting of lasix vs retention vs other  - Feurea was intrinsic disease  - monitor diuretic carefully, strict I/os    Deconditioning  - PT, OOB       dvt proph:  DOAC  Code status:    Full     Dispo: anticipate home,  pending clinical course. Advised to start looking for PCP, set up outpatient appointments.         MDM: High   I personally spent time on chart/note review, review of labs/imaging, discussion with patient, physical exam, discussion with staff, consultants, coordinating care, writing progress note, and discussion of plan of care.   Acute illness posing threat to life.  IV medications requiring close inpatient monitoring       Sara Leiva MD    Supplementary Documentation:

## 2024-08-07 NOTE — PROGRESS NOTES
Progress Note  Shubham Zurita Patient Status:  Inpatient    1970 MRN [id number]   Location [facility] 3W/SW Attending Sara Leiva MD   Hosp Day # 9 PCP No primary care provider on file.     SUBJECTIVE:    Having more fatigue today, ambulated yesterday but still feels weak.     VITALS:  /64 (BP Location: Right arm)   Pulse 94   Temp 98.5 °F (36.9 °C) (Oral)   Resp 18   Ht 5' 4\" (1.626 m)   Wt 181 lb 4.8 oz (82.2 kg)   SpO2 95%   BMI 31.12 kg/m²     INTAKE/OUTPUT:    Intake/Output Summary (Last 24 hours) at 2024 0744  Last data filed at 2024 0649  Gross per 24 hour   Intake 1070 ml   Output 2675 ml   Net -1605 ml     Last 3 Weights   24 0449 181 lb 4.8 oz (82.2 kg)   24 0606 184 lb 9.6 oz (83.7 kg)   24 0104 203 lb 6.4 oz (92.3 kg)   24 0434 215 lb 4.8 oz (97.7 kg)   24 0445 232 lb 8 oz (105.5 kg)   24 0510 241 lb 4.8 oz (109.5 kg)   24 0415 248 lb 3.2 oz (112.6 kg)   24 1049 243 lb 9.6 oz (110.5 kg)   24 0300 248 lb 12.8 oz (112.9 kg)   24 0007 244 lb 1.6 oz (110.7 kg)   24 1835 242 lb 8.1 oz (110 kg)   24 1730 240 lb (108.9 kg)     LABS:  Recent Labs   Lab 24  0936 24  0639 24  0644   * 88 92   BUN 25* 23 27*   CREATSERUM 1.04* 0.98 1.17*   EGFRCR 63 68 55*   CA 8.9 9.1 9.4    135* 134*   K 3.8 4.0 4.1   CL 98 95* 92*   CO2 34.0* 35.0* 39.0*     Recent Labs   Lab 24  0936 24  1711 24  0639 24  0644   RBC 3.23*  --  3.61* 3.64*   HGB 6.9* 7.8* 7.8* 7.9*   HCT 23.1* 24.9* 25.3* 25.8*   MCV 71.5*  --  70.1* 70.9*   MCH 21.4*  --  21.6* 21.7*   MCHC 29.9*  --  30.8* 30.6*   RDW 18.2*  --  18.0* 18.2*   NEPRELIM 2.64  --  2.53 3.00   WBC 4.7  --  5.0 5.4   .0  --  240.0 248.0     No results for input(s): \"TROP\", \"CK\" in the last 168 hours.    DIAGNOSTICS:    TELEMETRY: Afib, CVR    ROS: Negative unless noted above     PHYSICAL EXAM:  General: Alert  and oriented x 3. No apparent distress.  HEENT: Normocephalic, sclera are nonicteric. Hearing appropriate bilaterally.  Neck: + JVD. No Carotid bruits. Trachea midline.   Cardiac: IRRegular rate and rhythm. S1, S2 auscultated.   Lungs: Clear without wheezes, rales, rhonchi or dullness. Chest expansion symmetrical. Regular effort.  Abdomen: Soft, non-tender, +BS. No hepatosplenomegaly or appreciable masses.   Extremities: Without clubbing, cyanosis. Peripheral pulses are 1+. BLE edema +2-3 from feet to mid shin, +2 from mid shin to inferior patella, +1 from superior patella to mid thigh, then non-pitting.  Non-pitting abdomen  Neurologic: Motor and sensory nerves grossly intact.   Psych: Appropriate affect   Skin: Warm and dry. No obvious lesions, wounds, or ulcerations.     MEDICATIONS:   dilTIAZem ER  120 mg Oral Daily    apixaban  5 mg Oral BID    ferrous sulfate  325 mg Oral BID with meals    spironolactone  12.5 mg Oral Daily    methIMAzole  20 mg Oral Daily    tamsulosin  0.4 mg Oral Daily @ 0700    propranolol  20 mg Oral TID      [Held by provider] bumetanide (Bumex) 12.5 mg in 50 mL infusion Stopped (08/06/24 0829)     ASSESSMENT:    Presented with one month of dyspnea, fatigue, and 60lb weight gain.     New Onset Afib, Unclear duration, Now Persistent   - TSH abnormal   - s/p Cardizem gtt, On propranolol and long acting Cardizem. Rates 90s-110s. Missed two dose of BB yesterday for SBP 98, Parameters were to hold for SBP <100  - [id number] 2, s/p heparin infusion now on Eliquis     Acute HFpEF/ Right Sided HF/ Wide open TR, Mod MR   - , ECHO without RWMA, CXR small left pleural effusion, Albumin 3.2  - Bumex gtt 1mg/hr started 8/1, Net neg more than 28L, In comparison to standing wt on 8/1 she is down 67 lbs   - Renal fx worsening, s/p Diamox 8/5, Bumex and Aldactone on hold 8/7, BiCarb still elevated but down 1 point   - Appears overloaded    LE Edema/ Lymphedema   - Wound care consult pending,  possible tubigrips     Urinary Retention   - Urology evaluated, PO Tamulosin, s/p indwelling catheter, removed now and is urinating freely    Thyrotoxicosis w/ Crisis   - Endocrinology following   - Methimazole     Acute Anemia   - Hgb 6.9 yesterday, s/p 1unit PRBCs, CBC pending   - Occult stool neg      Decreased Mobility - Due to fatigue, evaluated by PT, no rehab on discharge  Electrolyte Imbalance-  Cardiac Replacement     PLAN:  - Continue diuretic holiday for now, Wt down 67 pounds, still with LE edema although may not be able to completely resolve with with diuretics, wound care consult pending for lymphedema management. JVD returned, PHTN on ECHO, if wt starts to increase may need to resume diuretics. Volume status is difficult to determine, may need RHC in the future.   - BB parameters changed, persistent Afib, will have EP see for rate verse rhythm control evaluation    Plan of care discussed with patient and RN.     Chery Garsia, JULIAN  8/7/2024  7:45 AM  [phone] ([place])  [phone] ([place])        Cardiologist Addendum:  Shubham Zurita was seen and examined independently and I agree with the above documentation provided by FAUSTO Lester.  Patient continues to be rate control, currently on diuretic holiday until renal function and volume contraction improved.  EP consulted and recommended increased propranolol, started midodrine and digoxin.  Diltiazem was continued as well.  Reevaluate patient for reinitiation of oral diuretics tomorrow.      Thank you for allowing me to take part in the care of Shubham Zurita. Please call with any questions of concerns.    L3    Garfield Padron DO  [facility]   Interventional Cardiac and Vascular Services      August 07, 2024  7:12 PM

## 2024-08-08 LAB
ANION GAP SERPL CALC-SCNC: 4 MMOL/L (ref 0–18)
BASOPHILS # BLD AUTO: 0.06 X10(3) UL (ref 0–0.2)
BASOPHILS NFR BLD AUTO: 1.2 %
BUN BLD-MCNC: 26 MG/DL (ref 9–23)
BUN/CREAT SERPL: 24.8 (ref 10–20)
CALCIUM BLD-MCNC: 8.7 MG/DL (ref 8.7–10.4)
CHLORIDE SERPL-SCNC: 94 MMOL/L (ref 98–112)
CO2 SERPL-SCNC: 35 MMOL/L (ref 21–32)
CREAT BLD-MCNC: 1.05 MG/DL
DEPRECATED RDW RBC AUTO: 47.8 FL (ref 35.1–46.3)
EGFRCR SERPLBLD CKD-EPI 2021: 63 ML/MIN/1.73M2 (ref 60–?)
EOSINOPHIL # BLD AUTO: 0.29 X10(3) UL (ref 0–0.7)
EOSINOPHIL NFR BLD AUTO: 5.9 %
ERYTHROCYTE [DISTWIDTH] IN BLOOD BY AUTOMATED COUNT: 18.3 % (ref 11–15)
GLUCOSE BLD-MCNC: 90 MG/DL (ref 70–99)
HCT VFR BLD AUTO: 24.5 %
HGB BLD-MCNC: 7.5 G/DL
IMM GRANULOCYTES # BLD AUTO: 0.01 X10(3) UL (ref 0–1)
IMM GRANULOCYTES NFR BLD: 0.2 %
LYMPHOCYTES # BLD AUTO: 1.34 X10(3) UL (ref 1–4)
LYMPHOCYTES NFR BLD AUTO: 27.5 %
MAGNESIUM SERPL-MCNC: 1.8 MG/DL (ref 1.6–2.6)
MCH RBC QN AUTO: 21.9 PG (ref 26–34)
MCHC RBC AUTO-ENTMCNC: 30.6 G/DL (ref 31–37)
MCV RBC AUTO: 71.4 FL
MONOCYTES # BLD AUTO: 0.53 X10(3) UL (ref 0.1–1)
MONOCYTES NFR BLD AUTO: 10.9 %
NEUTROPHILS # BLD AUTO: 2.65 X10 (3) UL (ref 1.5–7.7)
NEUTROPHILS # BLD AUTO: 2.65 X10(3) UL (ref 1.5–7.7)
NEUTROPHILS NFR BLD AUTO: 54.3 %
OSMOLALITY SERPL CALC.SUM OF ELEC: 280 MOSM/KG (ref 275–295)
PLATELET # BLD AUTO: 229 10(3)UL (ref 150–450)
POTASSIUM SERPL-SCNC: 3.8 MMOL/L (ref 3.5–5.1)
RBC # BLD AUTO: 3.43 X10(6)UL
SODIUM SERPL-SCNC: 133 MMOL/L (ref 136–145)
WBC # BLD AUTO: 4.9 X10(3) UL (ref 4–11)

## 2024-08-08 PROCEDURE — 99233 SBSQ HOSP IP/OBS HIGH 50: CPT | Performed by: HOSPITALIST

## 2024-08-08 RX ORDER — POTASSIUM CHLORIDE 20 MEQ/1
40 TABLET, EXTENDED RELEASE ORAL ONCE
Status: COMPLETED | OUTPATIENT
Start: 2024-08-08 | End: 2024-08-08

## 2024-08-08 RX ORDER — MAGNESIUM OXIDE 400 MG/1
400 TABLET ORAL ONCE
Status: COMPLETED | OUTPATIENT
Start: 2024-08-08 | End: 2024-08-08

## 2024-08-08 NOTE — DIETARY NOTE
ADULT NUTRITION REASSESSMENT    Pt is at moderate nutrition risk.  Pt meets moderate malnutrition criteria.      CRITERIA FOR MALNUTRITION DIAGNOSIS:  Criteria for non-severe malnutrition diagnosis: chronic illness related to energy intake less than75% for greater than 1 month, body fat mild depletion, and muscle mass mild depletion.    RECOMMENDATIONS TO MD: See Nutrition Intervention, continue with cardiac diet - encouraged increased intake.      ADMITTING DIAGNOSIS:  Atrial fibrillation with rapid ventricular response (HCC) [I48.91]  Thyrotoxicosis with thyrotoxic crisis, unspecified thyrotoxicosis type [E05.91]  Anemia, unspecified type [D64.9]  Cardiomyopathy, unspecified type (HCC) [I42.9]  Acute congestive heart failure, unspecified heart failure type (HCC) [I50.9]  PERTINENT PAST MEDICAL HISTORY: History reviewed. No pertinent past medical history.    PATIENT STATUS: Initial 08/04/24: Pt admit for A.fib, LE swelling/weight gain. PMH sig for none; doesn't usually see physical or take medication. New diagnosis of Grave's disease this admission along with CHF and anemia - microcytic.  Pt assessed due to screening at risk for MD consult for malnutrition, anemia and anorexia. PT was seen at the bedside.  Pt reports eating much better since admission.  Pt eating 2 meals per day on average and taking % of most meals.  Pt reports that PTA, her po intake was poor due to increased swelling in her LE and difficult getting up to use the bathroom.  Wt Hx: Pts weight has increase due to fluid retention -  lbs 1 year ago per pt.  Current weight of 214 lbs down from admission weight of 240 lbs.  Pt states she lost weight \"during COVID\" due to not eating out at restaurants.  Pt reports her UBW prior to 2020 was ~170 lbs and went down to 134 lbs.  There is no weight hx prior to this admission in EMR to confirm pts account of weight loss. Diet Hx:  Pt states she is \"mostly\" vegetarian, eats chicken and eggs;  doesn't consume/like milk or most dairy products.  Pt states she mostly drinks water at home with 1 coffee per week.  Prior to pts swelling which caused decreased po pt was eating 3 meals per day with a good appetite.   Malnutrition:  Pt has wasting to her upper body - clavicles, temples, triceps.  Pt reports gradual muscle loss over the last few years.  Pt is not interested in ONS and states she doesn't like them.  RD encouraged 3 meals per day or several small meals per day.  RD encouraged both animal and vegetarian sources of iron and encouraged use of Vit C (orange juice) when taking Iron supplement to maximize absorption.       Update 08/08/24: RA completed per protocol. Chart reviewed, diuresing well - bumex gtt (8/1-8/6) - down ~28L (~60#). Diuretics holiday r/t worsening renal function. Discussion with RN, possibly resuming Bumex tomorrow. Intakes reviewed, % 11 meals since last visit (good). Pt visited, confirms good intake/appetite Denies nausea or abdominal pain. No questions or concerns. CPM    FOOD/NUTRITION RELATED HISTORY:  Appetite: Good  Intake: ~% x11 meals documented since last visit - averaging 92% overall (good)  Intake Meeting Needs: Yes  Percent Meals Eaten (last 6 days)       Date/Time Percent Meals Eaten (%)    08/02/24 0721 100 %    08/02/24 1451 80 %    08/03/24 0823 100 %    08/03/24 1918 100 %    08/04/24 1036 75 %    08/05/24 0815 100 %    08/05/24 1332 100 %    08/05/24 1900 100 %    08/06/24 0841 100 %    08/06/24 1100 75 %    08/06/24 1700 40 %    08/07/24 0907 100 %    08/07/24 1538 100 %    08/07/24 1900 100 %    08/08/24 0838 100 %    08/08/24 1448 100 %          Food Allergies: No Known Food Allergies (NKFA)  Cultural/Ethnic/Christian Preferences: Obtained -mostly vegetarian. Eats chicken and eggs; doesn't consume/like milk or most dairy products.     GASTROINTESTINAL: +BM 8/7/24    MEDICATIONS: reviewed Electrolyte replacement per protocol (cardiac)   propranolol  30  mg Oral TID    midodrine  5 mg Oral TID    digoxin  125 mcg Oral QOD    dilTIAZem ER  120 mg Oral Daily    apixaban  5 mg Oral BID    ferrous sulfate  325 mg Oral BID with meals    methIMAzole  20 mg Oral Daily    tamsulosin  0.4 mg Oral Daily @ 0700     LABS: reviewed Hyponatremia (133)  Recent Labs     08/06/24  0644 08/07/24  0658 08/08/24  0600   GLU 92 85 90   BUN 27* 28* 26*   CREATSERUM 1.17* 1.12* 1.05*   CA 9.4 9.2 8.7   MG 1.6 1.7 1.8   * 134* 133*   K 4.1 4.3 3.8   CL 92* 93* 94*   CO2 39.0* 38.0* 35.0*   OSMOCALC 283 283 280     NUTRITION RELATED PHYSICAL FINDINGS:  - Nutrition Focused Physical Exam (NFPE): mild depletion body fat triceps region and mild depletion muscle mass temple region and clavicle region per physical and visual exam  - Fluid Accumulation: non-pitting abdominal and +3 Bilateral Lower extremity and Feet see RN documentation for details  - Skin Integrity: at risk see RN documentation for details    ANTHROPOMETRICS:  HT: 162.6 cm (5' 4\")  WT: 83.1 kg (183 lb 3.2 oz) - wt loss r/t fluid status, diuresing throughout admit  Last Visit Wt: 215# 5 oz on 8/4/24  Admit Wt: 242# 8 oz on 7/29/24  BMI: Body mass index is 31.45 kg/m².  BMI CLASSIFICATION: 30-34.9 kg/m2 - obesity class I  IBW: 120 lbs        182% IBW  Usual Body Wt: 134 lbs 1 year ago per patient      160% UBW    WEIGHT HISTORY:  Patient Weight(s) for the past 336 hrs:   Weight   08/08/24 0606 83.1 kg (183 lb 3.2 oz)   08/07/24 0449 82.2 kg (181 lb 4.8 oz)   08/06/24 0606 83.7 kg (184 lb 9.6 oz)   08/05/24 0104 92.3 kg (203 lb 6.4 oz)   08/04/24 0434 97.7 kg (215 lb 4.8 oz)   08/03/24 0445 105.5 kg (232 lb 8 oz)   08/02/24 0510 109.5 kg (241 lb 4.8 oz)   08/01/24 0415 112.6 kg (248 lb 3.2 oz)   07/31/24 1049 110.5 kg (243 lb 9.6 oz)   07/31/24 0300 112.9 kg (248 lb 12.8 oz)   07/30/24 0007 110.7 kg (244 lb 1.6 oz)   07/29/24 1835 110 kg (242 lb 8.1 oz)   07/29/24 1730 108.9 kg (240 lb)     Wt Readings from Last 10 Encounters:    08/08/24 83.1 kg (183 lb 3.2 oz)     NUTRITION DIAGNOSIS/PROBLEM:   Moderate Malnutrition related to Chronic - Physiological causes resulting in anorexia or diminished intake  as evidenced by estimated intake less than 75% for at least 1 month, mild muscle and fat loss.     NUTRITION DIAGNOSIS PROGRESS:  Improvement (unresolved) - good intake, wt loss r/t fluid status    NUTRITION INTERVENTION:   NUTRITION PRESCRIPTION:   Estimated Nutrition needs: --dosing wt of 61 kg - estimated dry weight  Calories: 9043-0812 calories/day (25-30 calories per kg Estimated dry wt)  Protein: 73-92 g protein/day (1.2-1.5 g protein/kg Estimated dry wt)  Fluid Needs: 1 mL/kcal or per MD     - Diet:       Procedures    Cardiac diet Cardiac; Is Patient on Accuchecks? No      - RD Malnutrition Care Plan: Encouraged small frequent meals with emphasis on high calorie/high protein and Monitor need for ONS (oral nutritional supplements)  - Meals and snacks: Encouraged small frequent meals and Encouraged adequate PO intake  - Medical Food Supplements-None at this time and Patient declined. Rational/use of oral supplements discussed.  - Vitamin and mineral supplements: iron/MD  - Feeding assistance: meal set up  - Nutrition education: Discussed importance of adequate energy and protein intake and discussed sources of iron and use of vit C to help with absorption       - Coordination of nutrition care: collaboration with other providers  - Discharge and transfer of nutrition care to new setting or provider: monitor plans- from home with spouse; pt has 1 child who is moving to [place] shortly     MONITOR AND EVALUATE/NUTRITION GOALS:  - Food and Nutrient Intake:      Monitor: adequacy of PO intake  - Food and Nutrient Administration:      Monitor: N/A  - Anthropometric Measurement:    Monitor weight  - Nutrition Goals:      allow wt loss due to fluid losses, PO greater than 75% of meals, minimize lean body mass loss, prevent skin breakdown, and  halt true wt loss    DIETITIAN FOLLOW UP: RD to follow and monitor nutrition status    Debbie Shepard MS, BING, RDN, LDN  Clinical Dietitian  P: [phone]

## 2024-08-08 NOTE — PROGRESS NOTES
Progress Note  Shubham Zurita Patient Status:  Inpatient    1970 MRN [id number]   Location [facility] 3W/SW Attending Matt Sierra MD   Hosp Day # 10 PCP No primary care provider on file.     Subjective:  In bed on exam. Feels much better compared to admission. Denies current cp, sob. Some dyspnea on exertion. Ongoing LE edema, although improved.     Objective:  /64 (BP Location: Right arm)   Pulse 91   Temp 98 °F (36.7 °C) (Oral)   Resp 18   Ht 5' 4\" (1.626 m)   Wt 183 lb 3.2 oz (83.1 kg)   SpO2 97%   BMI 31.45 kg/m²     Telemetry: afib      Intake/Output:    Intake/Output Summary (Last 24 hours) at 2024 1045  Last data filed at 2024 0838  Gross per 24 hour   Intake 1190 ml   Output 1400 ml   Net -210 ml       Last 3 Weights   24 0606 183 lb 3.2 oz (83.1 kg)   24 0449 181 lb 4.8 oz (82.2 kg)   24 0606 184 lb 9.6 oz (83.7 kg)   24 0104 203 lb 6.4 oz (92.3 kg)   24 0434 215 lb 4.8 oz (97.7 kg)   24 0445 232 lb 8 oz (105.5 kg)   24 0510 241 lb 4.8 oz (109.5 kg)   24 0415 248 lb 3.2 oz (112.6 kg)   24 1049 243 lb 9.6 oz (110.5 kg)   24 0300 248 lb 12.8 oz (112.9 kg)   24 0007 244 lb 1.6 oz (110.7 kg)   24 1835 242 lb 8.1 oz (110 kg)   24 1730 240 lb (108.9 kg)       Labs:  Recent Labs   Lab 24  0644 24  0658 24  0600   GLU 92 85 90   BUN 27* 28* 26*   CREATSERUM 1.17* 1.12* 1.05*   EGFRCR 55* 58* 63   CA 9.4 9.2 8.7   * 134* 133*   K 4.1 4.3 3.8   CL 92* 93* 94*   CO2 39.0* 38.0* 35.0*     Recent Labs   Lab 24  0644 24  0657 24  0600   RBC 3.64* 3.38* 3.43*   HGB 7.9* 7.6* 7.5*   HCT 25.8* 24.1* 24.5*   MCV 70.9* 71.3* 71.4*   MCH 21.7* 22.5* 21.9*   MCHC 30.6* 31.5 30.6*   RDW 18.2* 18.4* 18.3*   NEPRELIM 3.00 2.42 2.65   WBC 5.4 4.9 4.9   .0 235.0 229.0         No results for input(s): \"TROP\", \"TROPHS\", \"CK\" in the last 168 hours.    Review of Systems    Cardiovascular:  Positive for dyspnea on exertion, irregular heartbeat and leg swelling.   Respiratory: Negative.         Physical Exam:    Gen: alert, oriented x 3, NAD  Heent: pupils equal, reactive. Mucous membranes moist.   Neck: no jvd  Cardiac: irregularly irregular, normal S1,S2, murmur at left sternal border  Lungs: dim, on RA  Abd: soft, NT/ND +bs  Ext: 2+ ble edema.   Skin: Warm, dry  Neuro: No focal deficits        Medications:     propranolol  30 mg Oral TID    midodrine  5 mg Oral TID    digoxin  125 mcg Oral QOD    dilTIAZem ER  120 mg Oral Daily    apixaban  5 mg Oral BID    ferrous sulfate  325 mg Oral BID with meals    methIMAzole  20 mg Oral Daily    tamsulosin  0.4 mg Oral Daily @ 0700      [Held by provider] bumetanide (Bumex) 12.5 mg in 50 mL infusion Stopped (08/06/24 0829)           Assessment:  Afib with RVR, new onset, exacerbated by below. Rates currently controlled.   Propranolol, ccb, dig  Eliquis.   Thyrotoxicosis  Endocrine following  Acute HFpEF, severe valve disease with wide open TR and mod MR    Echo 7/30/24-LVEF 60-65%, no rwmas. RV function normal, mod MR, wide open TR.   Sig vol OL at time of echo. Now down ~60#. Will need repeat echo to re-evaluate valve disease.   Diuretics held with worsening DALTON   Hypotension-stable  Midodrine started.   Chronic LE edema with lymphedema   Recommend compression.   Urinary retention  Urology following.   Acute anemia, stable today.  Hgb as low as 6.9. s/p prbcs.     Plan:  Cont current rate control regimen. As outlined by Dr. Kellogg, do not make additional increases to rate control meds for at least 48 hours.   Cont uninterrupted eliquis.   Renal function continues to improve. Remains ~60# down since admit. Trend bnp. Anticipate resuming diuretics in am.    Plan of care discussed with patient, RN.    Rula Friend, APRN  8/8/2024  10:45 AM

## 2024-08-08 NOTE — PROGRESS NOTES
[facility]   Progress Note  Shubham Zurita Patient Status:  Inpatient    1970 MRN [id number]   Location [facility] 3W/SW Attending Sara Leiva MD   Hosp Day # 9 PCP No primary care provider on file.     Subjective:  Patient resting in bed. Reports spontaneous voiding (external catheter in place).     Objective:  Blood pressure 110/57, pulse 81, temperature 98.3 °F (36.8 °C), temperature source Oral, resp. rate 18, height 5' 4\" (1.626 m), weight 181 lb 4.8 oz (82.2 kg), SpO2 98%.  General appearance: alert, appears stated age and cooperative  Abdomen: Soft, non-tender  : external catheter in place, urine clear and yellow     Lab Results   Component Value Date    WBC 4.9 2024    HGB 7.6 2024    HCT 24.1 2024    .0 2024    CREATSERUM 1.12 2024    BUN 28 2024     2024    K 4.3 2024    CL 93 2024    CO2 38.0 2024    GLU 85 2024    CA 9.2 2024    MG 1.7 2024         Intake/Output Summary (Last 24 hours) at 2024  Last data filed at 2024 1900  Gross per 24 hour   Intake 1010 ml   Output 2425 ml   Net -1415 ml       Impression:   Urine retention - likely multifactorial, ie non-ambulatory state, generalized weakness, possibly hypotonic bladder, current thyroid issues    A-fib, Thyrotoxicosis, CHF      Recommendations:  Recommend random PVR check with bladder scanner to ensure bladder residuals <250 ml   Venegas has been removed at this time  Patient initiated on oral tamsulosin per primary service  Recommend follow up as out patient prn  Urology will sign off    Liberty Francis MD  [facility]

## 2024-08-08 NOTE — PROGRESS NOTES
[facility]  Progress Note     Shubham Zurita  : 1970    Status: Inpatient  Day #: 10    Attending: Matt Sierra MD  PCP: No primary care provider on file.     Assessment and Plan:    Atrial fibrillation with rapid ventricular response  Due to hyperthyroidism  - TSH low, started on methimazole  - cards on consult  - off dilt gtt  - cont diltiazem po, digoxin, inderal  - heparin gtt --> oral anticoag Eliquis  - Echo shows ejection fraction is 60 to 65%     HFpEF  - Ejection fraction is 60 to 65%, cannot assess LV dysfunction due to heart rhythm   - was on bumex drip - IV diuresis now held   - spironolactone 12.5mg every day held  - Strict I's and O's and daily weights, replete e-lytes per protocol  - cards on consult     Severe hyperthyroidism  Graves' disease    - Admission FT4 high with suppressed TCH  - strong family history of autoimmune thyroid disease   - Was given PTU and dexamethasone in the ED  - Endocrinology consulted  - Swtiched Methimazole 20 mg twice daily --> Methimazole daily  - FT4 normalized  - Pt needs strict endo follow up OP in 2 weeks      Microcytic anemia  - Iron labs reveal ACD  - cont to monitor H/H, transfuse for Hgb <7  - pt never had a pap smear, colonscopy etc. Reports she is mostly a vegetarian due to own taste preference.   - Dietician consulted  - fecal occult negative  - hgb 6.9  required 1U pRBC transfusion with appropriate response     Urinary Retention  - unclear etiology  - pt endorsing good BMs  - on diuretic, durbin inserted , spontaneously fell out   - flomax for now, dc on discharge  - urology consulted      DALTON   - Cr initially trended up to 1.15 from 0.6 on admission   - in setting of lasix vs retention vs other  - Feurea was intrinsic disease  - monitor diuretic carefully, strict I/os     Deconditioning  - PT, OOB     Dietitian Malnutrition Assessment        Evaluation for Malnutrition: Criteria for non-severe malnutrition diagnosis-          chronic illness related to   Energy intake less than 75% for greater than 1 month., Body fat mild depletion., Muscle mass mild depletion.       RD Malnutrition Care Plan: Encouraged small frequent meals with emphasis on high calorie/high protein., Monitor need for ONS (oral nutritional supplements).    Body Fat/Muscle Mass: Mild depletion body fat., Mild depletion muscle mass. triceps region. temple region., clavicle region.    Physician Assessment    Patient has a diagnosis of moderate malnutrition       DVT Mechanical Prophylaxis:        DVT Pharmacologic Prophylaxis   Medication    apixaban (Eliquis) tab 5 mg               Subjective:      Initial Chief Complaint:  wt gain and SOB    Feels better. No SOB, no CP.     10 point ROS completed and was negative, except for pertinent positive and negatives stated in subjective.      Objective:      Temp:  [98 °F (36.7 °C)-98.4 °F (36.9 °C)] 98 °F (36.7 °C)  Pulse:  [76-92] 92  Resp:  [18-20] 18  BP: ()/(52-65) 108/52  SpO2:  [97 %-98 %] 97 %  General:  Alert, no distress  HEENT:  Normocephalic, atraumatic  Cardiac:  Regular rate, regular rhythm  Pulmonary:  Clear to auscultation bilaterally, respirations unlabored  Gastrointestinal:  Soft, non-tender, normal bowel sounds  Musculoskeletal:  No joint swelling  Extremities:  b/l LE edema  Neurologic:  nonfocal  Psychiatric:  Normal affect, calm and appropriate    Intake/Output Summary (Last 24 hours) at 8/8/2024 1451  Last data filed at 8/8/2024 1448  Gross per 24 hour   Intake 1430 ml   Output 1400 ml   Net 30 ml         Recent Labs   Lab 08/06/24  0644 08/07/24  0657 08/08/24  0600   WBC 5.4 4.9 4.9   HGB 7.9* 7.6* 7.5*   HCT 25.8* 24.1* 24.5*   .0 235.0 229.0   RBC 3.64* 3.38* 3.43*   MCV 70.9* 71.3* 71.4*   MCH 21.7* 22.5* 21.9*   MCHC 30.6* 31.5 30.6*   RDW 18.2* 18.4* 18.3*   NEPRELIM 3.00 2.42 2.65     Recent Labs   Lab 08/02/24  0641 08/02/24  1350 08/03/24  0717 08/04/24  0936 08/05/24  0639  08/06/24  0644 08/07/24  0658 08/08/24  0600   BUN 24*   < > 25* 25* 23 27* 28* 26*   CREATSERUM 1.13*   < > 1.07* 1.04* 0.98 1.17* 1.12* 1.05*   CA 8.5*   < > 9.1 8.9 9.1 9.4 9.2 8.7   *   < > 138 137 135* 134* 134* 133*   K 3.8   < > 3.8 3.8 4.0 4.1 4.3 3.8      < > 102 98 95* 92* 93* 94*   CO2 27.0   < > 31.0 34.0* 35.0* 39.0* 38.0* 35.0*   GLU 90   < > 90 108* 88 92 85 90   MG 1.7  --  1.5* 1.4* 1.4* 1.6 1.7 1.8   PTT 44.7*   < > 63.1* 72.3* 101.8*  --   --   --    T4F 1.3  --   --   --   --   --   --   --     < > = values in this interval not displayed.       No results found.      Medications:   propranolol  30 mg Oral TID    midodrine  5 mg Oral TID    digoxin  125 mcg Oral QOD    dilTIAZem ER  120 mg Oral Daily    apixaban  5 mg Oral BID    ferrous sulfate  325 mg Oral BID with meals    methIMAzole  20 mg Oral Daily    tamsulosin  0.4 mg Oral Daily @ 0700      PRN Meds:   polyethylene glycol (PEG 3350)    dilTIAZem    acetaminophen    ondansetron    prochlorperazine    temazepam    Supplementary Documentation:        MDM High. Time spent on chart/note review, review of labs/imaging, discussion with patient, physical exam, discussion with staff, consultants, coordinating care, writing progress note, discussion of plan of care.      Matt Sierra MD

## 2024-08-08 NOTE — PLAN OF CARE
No acute changes. Vitals stable. Call light within reach.  Problem: CARDIOVASCULAR - ADULT  Goal: Maintains optimal cardiac output and hemodynamic stability  Description: INTERVENTIONS:  - Monitor vital signs, rhythm, and trends  - Monitor for bleeding, hypotension and signs of decreased cardiac output  - Evaluate effectiveness of vasoactive medications to optimize hemodynamic stability  - Monitor arterial and/or venous puncture sites for bleeding and/or hematoma  - Assess quality of pulses, skin color and temperature  - Assess for signs of decreased coronary artery perfusion - ex. Angina  - Evaluate fluid balance, assess for edema, trend weights  Outcome: Progressing  Goal: Absence of cardiac arrhythmias or at baseline  Description: INTERVENTIONS:  - Continuous cardiac monitoring, monitor vital signs, obtain 12 lead EKG if indicated  - Evaluate effectiveness of antiarrhythmic and heart rate control medications as ordered  - Initiate emergency measures for life threatening arrhythmias  - Monitor electrolytes and administer replacement therapy as ordered  Outcome: Progressing     Problem: SAFETY ADULT - FALL  Goal: Free from fall injury  Description: INTERVENTIONS:  - Assess pt frequently for physical needs  - Identify cognitive and physical deficits and behaviors that affect risk of falls.  - [place] fall precautions as indicated by assessment.  - Educate pt/family on patient safety including physical limitations  - Instruct pt to call for assistance with activity based on assessment  - Modify environment to reduce risk of injury  - Provide assistive devices as appropriate  - Consider OT/PT consult to assist with strengthening/mobility  - Encourage toileting schedule  Outcome: Progressing     Problem: DISCHARGE PLANNING  Goal: Discharge to home or other facility with appropriate resources  Description: INTERVENTIONS:  - Identify barriers to discharge w/pt and caregiver  - Include patient/family/discharge partner  in discharge planning  - Arrange for needed discharge resources and transportation as appropriate  - Identify discharge learning needs (meds, wound care, etc)  - Arrange for interpreters to assist at discharge as needed  - Consider post-discharge preferences of patient/family/discharge partner  - Complete POLST form as appropriate  - Assess patient's ability to be responsible for managing their own health  - Refer to Case Management Department for coordinating discharge planning if the patient needs post-hospital services based on physician/LIP order or complex needs related to functional status, cognitive ability or social support system  Outcome: Progressing

## 2024-08-09 LAB
ANION GAP SERPL CALC-SCNC: 3 MMOL/L (ref 0–18)
BASOPHILS # BLD AUTO: 0.07 X10(3) UL (ref 0–0.2)
BASOPHILS NFR BLD AUTO: 1.4 %
BNP SERPL-MCNC: 553 PG/ML
BUN BLD-MCNC: 26 MG/DL (ref 9–23)
BUN/CREAT SERPL: 25.7 (ref 10–20)
CALCIUM BLD-MCNC: 8.8 MG/DL (ref 8.7–10.4)
CHLORIDE SERPL-SCNC: 96 MMOL/L (ref 98–112)
CO2 SERPL-SCNC: 32 MMOL/L (ref 21–32)
CREAT BLD-MCNC: 1.01 MG/DL
DEPRECATED RDW RBC AUTO: 47.7 FL (ref 35.1–46.3)
EGFRCR SERPLBLD CKD-EPI 2021: 66 ML/MIN/1.73M2 (ref 60–?)
EOSINOPHIL # BLD AUTO: 0.26 X10(3) UL (ref 0–0.7)
EOSINOPHIL NFR BLD AUTO: 5 %
ERYTHROCYTE [DISTWIDTH] IN BLOOD BY AUTOMATED COUNT: 18.4 % (ref 11–15)
GLUCOSE BLD-MCNC: 141 MG/DL (ref 70–99)
HCT VFR BLD AUTO: 26 %
HGB BLD-MCNC: 7.8 G/DL
IMM GRANULOCYTES # BLD AUTO: 0.01 X10(3) UL (ref 0–1)
IMM GRANULOCYTES NFR BLD: 0.2 %
LYMPHOCYTES # BLD AUTO: 1.35 X10(3) UL (ref 1–4)
LYMPHOCYTES NFR BLD AUTO: 26.1 %
MAGNESIUM SERPL-MCNC: 1.8 MG/DL (ref 1.6–2.6)
MCH RBC QN AUTO: 21.6 PG (ref 26–34)
MCHC RBC AUTO-ENTMCNC: 30 G/DL (ref 31–37)
MCV RBC AUTO: 72 FL
MONOCYTES # BLD AUTO: 0.42 X10(3) UL (ref 0.1–1)
MONOCYTES NFR BLD AUTO: 8.1 %
NEUTROPHILS # BLD AUTO: 3.07 X10 (3) UL (ref 1.5–7.7)
NEUTROPHILS # BLD AUTO: 3.07 X10(3) UL (ref 1.5–7.7)
NEUTROPHILS NFR BLD AUTO: 59.2 %
OSMOLALITY SERPL CALC.SUM OF ELEC: 279 MOSM/KG (ref 275–295)
PLATELET # BLD AUTO: 225 10(3)UL (ref 150–450)
POTASSIUM SERPL-SCNC: 3.7 MMOL/L (ref 3.5–5.1)
POTASSIUM SERPL-SCNC: 3.7 MMOL/L (ref 3.5–5.1)
RBC # BLD AUTO: 3.61 X10(6)UL
SODIUM SERPL-SCNC: 131 MMOL/L (ref 136–145)
WBC # BLD AUTO: 5.2 X10(3) UL (ref 4–11)

## 2024-08-09 PROCEDURE — 99233 SBSQ HOSP IP/OBS HIGH 50: CPT | Performed by: HOSPITALIST

## 2024-08-09 RX ORDER — POTASSIUM CHLORIDE 20 MEQ/1
40 TABLET, EXTENDED RELEASE ORAL ONCE
Status: COMPLETED | OUTPATIENT
Start: 2024-08-09 | End: 2024-08-09

## 2024-08-09 RX ORDER — FUROSEMIDE 40 MG/1
40 TABLET ORAL DAILY
Status: DISCONTINUED | OUTPATIENT
Start: 2024-08-09 | End: 2024-08-13

## 2024-08-09 RX ORDER — MAGNESIUM OXIDE 400 MG/1
400 TABLET ORAL ONCE
Status: COMPLETED | OUTPATIENT
Start: 2024-08-09 | End: 2024-08-09

## 2024-08-09 NOTE — PLAN OF CARE
Patient transitioned to PO lasix. Patient noted with good urine output, noted ambulating to bathroom throughout shift. Bp remains in low 100's throughout shift. Denies any pain/discomfort. Reports improvement to BLE edema.     Problem: CARDIOVASCULAR - ADULT  Goal: Maintains optimal cardiac output and hemodynamic stability  Description: INTERVENTIONS:  - Monitor vital signs, rhythm, and trends  - Monitor for bleeding, hypotension and signs of decreased cardiac output  - Evaluate effectiveness of vasoactive medications to optimize hemodynamic stability  - Monitor arterial and/or venous puncture sites for bleeding and/or hematoma  - Assess quality of pulses, skin color and temperature  - Assess for signs of decreased coronary artery perfusion - ex. Angina  - Evaluate fluid balance, assess for edema, trend weights  8/9/2024 1620 by Janet Lancaster  Outcome: Progressing  8/9/2024 1619 by Janet Lancaster  Outcome: Progressing  Goal: Absence of cardiac arrhythmias or at baseline  Description: INTERVENTIONS:  - Continuous cardiac monitoring, monitor vital signs, obtain 12 lead EKG if indicated  - Evaluate effectiveness of antiarrhythmic and heart rate control medications as ordered  - Initiate emergency measures for life threatening arrhythmias  - Monitor electrolytes and administer replacement therapy as ordered  8/9/2024 1620 by Janet Lancaster  Outcome: Progressing  8/9/2024 1619 by Janet Lancaster  Outcome: Progressing     Problem: SAFETY ADULT - FALL  Goal: Free from fall injury  Description: INTERVENTIONS:  - Assess pt frequently for physical needs  - Identify cognitive and physical deficits and behaviors that affect risk of falls.  - [place] fall precautions as indicated by assessment.  - Educate pt/family on patient safety including physical limitations  - Instruct pt to call for assistance with activity based on assessment  - Modify environment to reduce risk of injury  - Provide assistive  devices as appropriate  - Consider OT/PT consult to assist with strengthening/mobility  - Encourage toileting schedule  8/9/2024 1620 by Janet Lancaster  Outcome: Progressing  8/9/2024 1619 by Janet Lancaster  Outcome: Progressing     Problem: PAIN - ADULT  Goal: Verbalizes/displays adequate comfort level or patient's stated pain goal  Description: INTERVENTIONS:  - Encourage pt to monitor pain and request assistance  - Assess pain using appropriate pain scale  - Administer analgesics based on type and severity of pain and evaluate response  - Implement non-pharmacological measures as appropriate and evaluate response  - Consider cultural and social influences on pain and pain management  - Manage/alleviate anxiety  - Utilize distraction and/or relaxation techniques  - Monitor for opioid side effects  - Notify MD/LIP if interventions unsuccessful or patient reports new pain  - Anticipate increased pain with activity and pre-medicate as appropriate  Outcome: Progressing

## 2024-08-09 NOTE — PAYOR COMM NOTE
--------------  8/8-9 CONTINUED STAY REVIEW    Payor: [facility]  Subscriber #:  [id number]  Authorization Number: [id number]    8/8:     CARDS:    In bed on exam. Feels much better compared to admission. Denies current cp, sob. Some dyspnea on exertion. Ongoing LE edema, although improved.      Objective:  /64 (BP Location: Right arm)   Pulse 91   Temp 98 °F (36.7 °C) (Oral)   Resp 18   Ht 5' 4\" (1.626 m)   Wt 183 lb 3.2 oz (83.1 kg)   SpO2 97%   BMI 31.45 kg/m²      Telemetry: afib    Last 3 Weights   08/08/24 0606 183 lb 3.2 oz (83.1 kg)   08/07/24 0449 181 lb 4.8 oz (82.2 kg)   08/06/24 0606 184 lb 9.6 oz (83.7 kg)   08/05/24 0104 203 lb 6.4 oz (92.3 kg)           Recent Labs   Lab 08/06/24  0644 08/07/24  0658 08/08/24  0600   GLU 92 85 90   BUN 27* 28* 26*   CREATSERUM 1.17* 1.12* 1.05*   EGFRCR 55* 58* 63   CA 9.4 9.2 8.7   * 134* 133*   K 4.1 4.3 3.8   CL 92* 93* 94*   CO2 39.0* 38.0* 35.0*      RBC 3.64* 3.38* 3.43*   HGB 7.9* 7.6* 7.5*   HCT 25.8* 24.1* 24.5*   MCV 70.9* 71.3* 71.4*   MCH 21.7* 22.5* 21.9*   MCHC 30.6* 31.5 30.6*   RDW 18.2* 18.4* 18.3*   NEPRELIM 3.00 2.42 2.65   WBC 5.4 4.9 4.9   .0 235.0 229.0       Physical Exam:    Gen: alert, oriented x 3, NAD  Heent: pupils equal, reactive. Mucous membranes moist.   Neck: no jvd  Cardiac: irregularly irregular, normal S1,S2, murmur at left sternal border  Lungs: dim, on RA  Abd: soft, NT/ND +bs  Ext: 2+ ble edema.   Skin: Warm, dry  Neuro: No focal deficits      Assessment:  Afib with RVR, new onset, exacerbated by below. Rates currently controlled.   Propranolol, ccb, dig  Eliquis.   Thyrotoxicosis  Endocrine following  Acute HFpEF, severe valve disease with wide open TR and mod MR    Echo 7/30/24-LVEF 60-65%, no rwmas. RV function normal, mod MR, wide open TR.   Sig vol OL at time of echo. Now down ~60#. Will need repeat echo to re-evaluate valve disease.   Diuretics held with  worsening DALTON   Hypotension-stable  Midodrine started.   Chronic LE edema with lymphedema   Recommend compression.   Urinary retention  Urology following.   Acute anemia, stable today.  Hgb as low as 6.9. s/p prbcs.      Plan:  Cont current rate control regimen. As outlined by Dr. Kellogg, do not make additional increases to rate control meds for at least 48 hours.   Cont uninterrupted eliquis.   Renal function continues to improve. Remains ~60# down since admit. Trend bnp. Anticipate resuming diuretics in am.    HOSPITALIST:      Assessment and Plan:     Atrial fibrillation with rapid ventricular response  Due to hyperthyroidism  - TSH low, started on methimazole  - cards on consult  - off dilt gtt  - cont diltiazem po, digoxin, inderal  - heparin gtt --> oral anticoag Eliquis  - Echo shows ejection fraction is 60 to 65%     HFpEF  - Ejection fraction is 60 to 65%, cannot assess LV dysfunction due to heart rhythm   - was on bumex drip - IV diuresis now held   - spironolactone 12.5mg every day held  - Strict I's and O's and daily weights, replete e-lytes per protocol  - cards on consult     Severe hyperthyroidism  Graves' disease    - Admission FT4 high with suppressed TCH  - strong family history of autoimmune thyroid disease   - Was given PTU and dexamethasone in the ED  - Endocrinology consulted  - Swtiched Methimazole 20 mg twice daily --> Methimazole daily  - FT4 normalized  - Pt needs strict endo follow up OP in 2 weeks      Microcytic anemia  - Iron labs reveal ACD  - cont to monitor H/H, transfuse for Hgb <7  - pt never had a pap smear, colonscopy etc. Reports she is mostly a vegetarian due to own taste preference.   - Dietician consulted  - fecal occult negative  - hgb 6.9 8/4 required 1U pRBC transfusion with appropriate response     Urinary Retention  - unclear etiology  - pt endorsing good BMs  - on diuretic, durbin inserted 8/1, spontaneously fell out 8/4  - flomax for now, dc on discharge  - urology  consulted      DALTON   - Cr initially trended up to 1.15 from 0.6 on admission   - in setting of lasix vs retention vs other  - Feurea was intrinsic disease  - monitor diuretic carefully, strict I/os     Deconditioning  - PT, OOB       Scheduled Medications[]Expand by Default    propranolol  30 mg Oral TID    midodrine  5 mg Oral TID    digoxin  125 mcg Oral QOD    dilTIAZem ER  120 mg Oral Daily    apixaban  5 mg Oral BID    ferrous sulfate  325 mg Oral BID with meals    methIMAzole  20 mg Oral Daily    tamsulosin  0.4 mg Oral Daily @ 0700         Feels better. No SOB, no CP.     emp:  [98 °F (36.7 °C)-98.4 °F (36.9 °C)] 98 °F (36.7 °C)  Pulse:  [76-92] 92  Resp:  [18-20] 18  BP: ()/(52-65) 108/52  SpO2:  [97 %-98 %] 97 %  General:  Alert, no distress  HEENT:  Normocephalic, atraumatic  Cardiac:  Regular rate, regular rhythm  Pulmonary:  Clear to auscultation bilaterally, respirations unlabored  Gastrointestinal:  Soft, non-tender, normal bowel sounds  Musculoskeletal:  No joint swelling  Extremities:  b/l LE edema  Neurologic:  nonfocal  Psychiatric:  Normal affect, calm and appropriate      8/9:    CARDS:    In bed on exam.denies cp, sob.ongoing le edema, now wrapped.     Objective:  /64 (BP Location: Right arm)   Pulse 72   Temp 99 °F (37.2 °C) (Oral)   Resp 18   Ht 5' 4\" (1.626 m)   Wt 184 lb 4.8 oz (83.6 kg)   SpO2 99%   BMI 31.64 kg/m²      Telemetry: afib         Last 3 Weights   08/09/24 0412 184 lb 4.8 oz (83.6 kg)   08/08/24 0606 183 lb 3.2 oz (83.1 kg)   08/07/24 0449 181 lb 4.8 oz (82.2 kg)   08/06/24 0606 184 lb 9.6 oz (83.7 kg)       Lab 08/07/24  0657 08/08/24  0600 08/09/24  0814   RBC 3.38* 3.43* 3.61*   HGB 7.6* 7.5* 7.8*   HCT 24.1* 24.5* 26.0*   MCV 71.3* 71.4* 72.0*   MCH 22.5* 21.9* 21.6*   MCHC 31.5 30.6* 30.0*   RDW 18.4* 18.3* 18.4*   NEPRELIM 2.42 2.65 3.07   WBC 4.9 4.9 5.2   .0 229.0 225.0      Physical Exam:    Gen: alert, oriented x 3, NAD  Heent: pupils equal,  reactive. Mucous membranes moist.   Neck: no jvd  Cardiac: irregularly irregular, normal S1,S2, murmur at left sternal border  Lungs: dim, on RA  Abd: soft, NT/ND +bs  Ext: 2+ ble edema.   Skin: Warm, dry  Neuro: No focal deficits      Assessment:  Afib with RVR, new onset, exacerbated by below. Rates currently controlled.   Propranolol, ccb, dig  Eliquis.   Thyrotoxicosis  Endocrine following  Acute HFpEF, severe valve disease with wide open TR and mod MR    Echo 7/30/24-LVEF 60-65%, no rwmas. RV function normal, mod MR, wide open TR.   Sig vol OL at time of echo. Now down ~60#. Will need repeat echo to re-evaluate valve disease.   Diuretics held with worsening DALTON   Hypotension-stable  Midodrine started.   Chronic LE edema with lymphedema   Recommend compression.   Urinary retention  Urology following.   Acute anemia, stable today.  Hgb as low as 6.9. s/p prbcs.      Plan:  Cont current rate control regimen. Rates currently controlled. Evaluated by Dr. Kellogg on 8/7.  Cont uninterrupted eliquis.   Remains ~60# down since admit. Creat normalized. Start oral diuretic.     HOSPITALIST:    Assessment and Plan:     Atrial fibrillation with rapid ventricular response  Due to hyperthyroidism  - TSH low, started on methimazole  - cards on consult  - off dilt gtt  - cont diltiazem po, digoxin, inderal  - heparin gtt --> oral anticoag Eliquis  - Echo shows ejection fraction is 60 to 65%     HFpEF  - Ejection fraction is 60 to 65%, cannot assess LV dysfunction due to heart rhythm   - was on bumex drip - IV diuresis held -> now on lasix PO  - spironolactone 12.5mg every day held  - Strict I's and O's and daily weights, replete e-lytes per protocol  - cards on consult     Severe hyperthyroidism  Graves' disease    - Admission FT4 high with suppressed TCH  - strong family history of autoimmune thyroid disease   - Was given PTU and dexamethasone in the ED  - Endocrinology consulted  - Swtiched Methimazole 20 mg twice daily  --> Methimazole daily  - FT4 normalized  - Pt needs strict endo follow up OP in 2 weeks      Microcytic anemia  - Iron labs reveal ACD  - cont to monitor H/H, transfuse for Hgb <7  - pt never had a pap smear, colonscopy etc. Reports she is mostly a vegetarian due to own taste preference.   - Dietician consulted  - fecal occult negative  - hgb 6.9 8/4 required 1U pRBC transfusion with appropriate response     Urinary Retention  - unclear etiology  - pt endorsing good BMs  - on diuretic, durbin inserted 8/1, spontaneously fell out 8/4  - flomax for now, dc on discharge  - urology consulted      DALTON   - Cr initially trended up to 1.15 from 0.6 on admission   - in setting of lasix vs retention vs other  - Feurea was intrinsic disease  - monitor diuretic carefully, strict I/os     Deconditioning  - PT, OOB      Subjective:   Feels better. No SOB, no CP.    Temp:  [98.1 °F (36.7 °C)-99 °F (37.2 °C)] 98.1 °F (36.7 °C)  Pulse:  [69-88] 70  Resp:  [18-20] 18  BP: ()/(57-64) 105/60  SpO2:  [96 %-99 %] 97 %  General:  Alert, no distress  HEENT:  Normocephalic, atraumatic  Cardiac:  Regular rate, regular rhythm  Pulmonary:  Clear to auscultation bilaterally, respirations unlabored  Gastrointestinal:  Soft, non-tender, normal bowel sounds  Musculoskeletal:  No joint swelling  Extremities:  b/l LE edema  Neurologic:  nonfocal  Psychiatric:  Normal affect, calm and appropriate      MEDICATIONS ADMINISTERED IN LAST 1 DAY:  apixaban (Eliquis) tab 5 mg       Date Action Dose Route User    8/9/2024 0958 Given 5 mg Oral Tonny, Janet    8/8/2024 2124 Given 5 mg Oral Pilar Agrawal, RN          digoxin (Lanoxin) tab 125 mcg       Date Action Dose Route User    8/9/2024 0958 Given 125 mcg Oral Tonny, Janet          dilTIAZem ER (Dilacor XR) 24 hr cap 120 mg       Date Action Dose Route User    8/9/2024 0958 Given 120 mg Oral Tonny, Janet          ferrous sulfate DR tab 325 mg       Date Action Dose Route User     8/9/2024 0958 Given 325 mg Oral Tonny, Janet    8/8/2024 1728 Given 325 mg Oral Brionna Flores RN          furosemide (Lasix) tab 40 mg       Date Action Dose Route User    8/9/2024 1326 Given 40 mg Oral Tonny, Janet          magnesium oxide (Mag-Ox) tab 400 mg       Date Action Dose Route User    8/9/2024 1222 Given 400 mg Oral Tonny, Janet          methIMAzole (Tapazole) tab 20 mg       Date Action Dose Route User    8/9/2024 0958 Given 20 mg Oral Tonny, Janet          midodrine (ProAmatine) tab 5 mg       Date Action Dose Route User    8/9/2024 1222 Given 5 mg Oral Tonny, Janet    8/9/2024 0639 Given 5 mg Oral AgrawalPilar RN    8/8/2024 1728 Given 5 mg Oral Brionna Flores RN          potassium chloride (Klor-Con M20) tab 40 mEq       Date Action Dose Route User    8/9/2024 1222 Given 40 mEq Oral Tonny, Janet          propranolol (Inderal) tab 30 mg       Date Action Dose Route User    8/9/2024 0958 Given 30 mg Oral Tonny, Janet    8/8/2024 2123 Given 30 mg Oral Pilar Agrawal RN    8/8/2024 1728 Given 30 mg Oral Brionna Flores RN          tamsulosin (Flomax) cap 0.4 mg       Date Action Dose Route User    8/9/2024 0639 Given 0.4 mg Oral Pilar Agrawal RN            Vitals (last day)       Date/Time Temp Pulse Resp BP SpO2 Weight O2 Device O2 Flow Rate (L/min) [facility]    08/09/24 1609 98.3 °F (36.8 °C) 76 16 106/60 99 % -- None (Room air) -- CS    08/09/24 1326 -- 70 -- 105/60 99 % -- None (Room air) -- CS    08/09/24 1224 -- 88 -- 97/57 -- -- -- --     08/09/24 0956 98.1 °F (36.7 °C) 87 18 104/64 97 % -- None (Room air) -- CS    08/09/24 0638 -- 72 18 113/64 -- -- -- -- AS    08/09/24 0412 -- -- -- -- -- 184 lb 4.8 oz (83.6 kg) -- -- RA    08/09/24 0215 99 °F (37.2 °C) 73 18 102/62 99 % -- None (Room air) -- AS    08/08/24 2122 98.6 °F (37 °C) 84 18 104/64 99 % -- None (Room air) -- AS    08/08/24 1700 -- 69 20 108/62 96 % -- None (Room air) -- LP     08/08/24 1220 -- 92 -- 108/52 -- -- -- -- LP    08/08/24 0900 98 °F (36.7 °C) 91 18 111/64 97 % -- None (Room air) -- LP    08/08/24 0606 -- 76 -- 108/62 98 % 183 lb 3.2 oz (83.1 kg) None (Room air) -- KG    08/08/24 0126 98.4 °F (36.9 °C) 84 18 101/55 97 % -- None (Room air) -- KG

## 2024-08-09 NOTE — PLAN OF CARE
Problem: Patient Centered Care  Goal: Patient preferences are identified and integrated in the patient's plan of care  Description: Interventions:  - What would you like us to know as we care for you? From home with   - Provide timely, complete, and accurate information to patient/family  - Incorporate patient and family knowledge, values, beliefs, and cultural backgrounds into the planning and delivery of care  - Encourage patient/family to participate in care and decision-making at the level they choose  - Honor patient and family perspectives and choices  Outcome: Progressing     Problem: Patient/Family Goals  Goal: Patient/Family Long Term Goal  Description: Patient's Long Term Goal: go home    Interventions:  - follow md orders  - See additional Care Plan goals for specific interventions  Outcome: Progressing  Goal: Patient/Family Short Term Goal  Description: Patient's Short Term Goal: remain safe in hospital    Interventions:   - monitor on tele  - heparin gtt  - See additional Care Plan goals for specific interventions  Outcome: Progressing     Problem: CARDIOVASCULAR - ADULT  Goal: Maintains optimal cardiac output and hemodynamic stability  Description: INTERVENTIONS:  - Monitor vital signs, rhythm, and trends  - Monitor for bleeding, hypotension and signs of decreased cardiac output  - Evaluate effectiveness of vasoactive medications to optimize hemodynamic stability  - Monitor arterial and/or venous puncture sites for bleeding and/or hematoma  - Assess quality of pulses, skin color and temperature  - Assess for signs of decreased coronary artery perfusion - ex. Angina  - Evaluate fluid balance, assess for edema, trend weights  Outcome: Progressing  Goal: Absence of cardiac arrhythmias or at baseline  Description: INTERVENTIONS:  - Continuous cardiac monitoring, monitor vital signs, obtain 12 lead EKG if indicated  - Evaluate effectiveness of antiarrhythmic and heart rate control medications as  ordered  - Initiate emergency measures for life threatening arrhythmias  - Monitor electrolytes and administer replacement therapy as ordered  Outcome: Progressing     Problem: SAFETY ADULT - FALL  Goal: Free from fall injury  Description: INTERVENTIONS:  - Assess pt frequently for physical needs  - Identify cognitive and physical deficits and behaviors that affect risk of falls.  - [place] fall precautions as indicated by assessment.  - Educate pt/family on patient safety including physical limitations  - Instruct pt to call for assistance with activity based on assessment  - Modify environment to reduce risk of injury  - Provide assistive devices as appropriate  - Consider OT/PT consult to assist with strengthening/mobility  - Encourage toileting schedule  Outcome: Progressing     Problem: DISCHARGE PLANNING  Goal: Discharge to home or other facility with appropriate resources  Description: INTERVENTIONS:  - Identify barriers to discharge w/pt and caregiver  - Include patient/family/discharge partner in discharge planning  - Arrange for needed discharge resources and transportation as appropriate  - Identify discharge learning needs (meds, wound care, etc)  - Arrange for interpreters to assist at discharge as needed  - Consider post-discharge preferences of patient/family/discharge partner  - Complete POLST form as appropriate  - Assess patient's ability to be responsible for managing their own health  - Refer to Case Management Department for coordinating discharge planning if the patient needs post-hospital services based on physician/LIP order or complex needs related to functional status, cognitive ability or social support system  Outcome: Progressing

## 2024-08-09 NOTE — PROGRESS NOTES
Progress Note  Shubham Zurita Patient Status:  Inpatient    1970 MRN [id number]   Location [facility] 3W/SW Attending Matt Sierra MD   Hosp Day # 11 PCP No primary care provider on file.     Subjective:  In bed on exam.denies cp, sob.ongoing le edema, now wrapped.    Objective:  /64 (BP Location: Right arm)   Pulse 72   Temp 99 °F (37.2 °C) (Oral)   Resp 18   Ht 5' 4\" (1.626 m)   Wt 184 lb 4.8 oz (83.6 kg)   SpO2 99%   BMI 31.64 kg/m²     Telemetry: afib      Intake/Output:    Intake/Output Summary (Last 24 hours) at 2024 0851  Last data filed at 2024 0639  Gross per 24 hour   Intake 635 ml   Output 700 ml   Net -65 ml       Last 3 Weights   24 0412 184 lb 4.8 oz (83.6 kg)   24 0606 183 lb 3.2 oz (83.1 kg)   24 0449 181 lb 4.8 oz (82.2 kg)   24 0606 184 lb 9.6 oz (83.7 kg)   24 0104 203 lb 6.4 oz (92.3 kg)   24 0434 215 lb 4.8 oz (97.7 kg)   24 0445 232 lb 8 oz (105.5 kg)   24 0510 241 lb 4.8 oz (109.5 kg)   24 0415 248 lb 3.2 oz (112.6 kg)   24 1049 243 lb 9.6 oz (110.5 kg)   24 0300 248 lb 12.8 oz (112.9 kg)   24 0007 244 lb 1.6 oz (110.7 kg)   24 1835 242 lb 8.1 oz (110 kg)   24 1730 240 lb (108.9 kg)       Labs:  Recent Labs   Lab 24  0644 24  0658 24  0600   GLU 92 85 90   BUN 27* 28* 26*   CREATSERUM 1.17* 1.12* 1.05*   EGFRCR 55* 58* 63   CA 9.4 9.2 8.7   * 134* 133*   K 4.1 4.3 3.8   CL 92* 93* 94*   CO2 39.0* 38.0* 35.0*     Recent Labs   Lab 24  0657 24  0600 24  0814   RBC 3.38* 3.43* 3.61*   HGB 7.6* 7.5* 7.8*   HCT 24.1* 24.5* 26.0*   MCV 71.3* 71.4* 72.0*   MCH 22.5* 21.9* 21.6*   MCHC 31.5 30.6* 30.0*   RDW 18.4* 18.3* 18.4*   NEPRELIM 2.42 2.65 3.07   WBC 4.9 4.9 5.2   .0 229.0 225.0         No results for input(s): \"TROP\", \"TROPHS\", \"CK\" in the last 168 hours.    Review of Systems   Cardiovascular:  Positive for dyspnea on exertion,  irregular heartbeat and leg swelling.   Respiratory: Negative.         Physical Exam:    Gen: alert, oriented x 3, NAD  Heent: pupils equal, reactive. Mucous membranes moist.   Neck: no jvd  Cardiac: irregularly irregular, normal S1,S2, murmur at left sternal border  Lungs: dim, on RA  Abd: soft, NT/ND +bs  Ext: 2+ ble edema.   Skin: Warm, dry  Neuro: No focal deficits        Medications:     propranolol  30 mg Oral TID    midodrine  5 mg Oral TID    digoxin  125 mcg Oral QOD    dilTIAZem ER  120 mg Oral Daily    apixaban  5 mg Oral BID    ferrous sulfate  325 mg Oral BID with meals    methIMAzole  20 mg Oral Daily    tamsulosin  0.4 mg Oral Daily @ 0700      [Held by provider] bumetanide (Bumex) 12.5 mg in 50 mL infusion Stopped (08/06/24 0829)           Assessment:  Afib with RVR, new onset, exacerbated by below. Rates currently controlled.   Propranolol, ccb, dig  Eliquis.   Thyrotoxicosis  Endocrine following  Acute HFpEF, severe valve disease with wide open TR and mod MR    Echo 7/30/24-LVEF 60-65%, no rwmas. RV function normal, mod MR, wide open TR.   Sig vol OL at time of echo. Now down ~60#. Will need repeat echo to re-evaluate valve disease.   Diuretics held with worsening DALTON   Hypotension-stable  Midodrine started.   Chronic LE edema with lymphedema   Recommend compression.   Urinary retention  Urology following.   Acute anemia, stable today.  Hgb as low as 6.9. s/p prbcs.     Plan:  Cont current rate control regimen. Rates currently controlled. Evaluated by Dr. Kellogg on 8/7.  Cont uninterrupted eliquis.   Remains ~60# down since admit. Creat normalized. Start oral diuretic.     Plan of care discussed with patient, RN.    Rula Friend, APRN  8/9/2024  8:51 AM        Patient seen and examined independently.  Note reviewed and labs reviewed.  Agree with above assessment and plan.  Eliquis for stroke prevention.  Needs diuresis for another 24-48 hrs for marked volume overload.  Continue rate  control strategy for afib and uptitrate meds as needed.  Goal resting HR <110 bpm.

## 2024-08-09 NOTE — PAYOR COMM NOTE
--------------  CONTINUED STAY REVIEW    Payor: [facility]  Subscriber #:  [id number]  Authorization Number: [id number]    Admit date: 7/29/24  Admit time:  9:42 PM    REVIEW DOCUMENTATION:  8/3/2024:  Cardiology Progress Note   No acute events overnight  No SOB today  Still has LE edema but feel less swollen    Temp: 98.8 °F (37.1 °C)  Pulse: 92  Resp: 20  BP: 112/66    8/3/2024 0445      Gross per 24 hour   Intake 600 ml   Output 5500 ml   Net -4900 ml     Plan:  Good response to diuresis. Still has significant volume overload. Continue Bumex. Monitor Cr, Uoutput, I/Os.  Replete K, Mg    Level of care: L3     Gilson Child MD  8/3/2024  8:37 AM    8/4/2024:  Internal Medicine   losing weight, 27lbs since admission   Hgb 6.9 this morning, pt agreed to 1U pRBC. Dietician consulted     Temp:  [98.4 °F (36.9 °C)-98.9 °F (37.2 °C)] 98.7 °F (37.1 °C)  Pulse:  [84-99] 87  Resp:  [15-18] 15  BP: (100-117)/(57-67) 106/57  SpO2:  [96 %-100 %] 96 %    08/04/24 215 lb 4.8 oz (97.7 kg)        BMP         CBC       Other      Na 137 Cl 98 BUN 25 Glu 108     Hb 7.8     PTT 72.3 Procal -    K 3.8 CO2 34.0 Cr 1.04     WBC 4.7 >< .0   INR - CRP -    Renal Lytes Endo       Hct 24.9     Trop - D dim -    eGFR - Ca 8.9 POC Gluc  -       LFT     pBNP - Lactic -    eGFR AA - PO4 - A1c -     AST - APk - Prot -   LDL -        Mg 1.4 TSH -     ALT - T jagdeep - Alb -            ASSESSMENT / PLAN:     Atrial fibrillation with rapid ventricular response  Due to hyperthyroidism  - TSH low, started on methimazole  - cards on consult  - off dilt gtt  - cont po diltiazem, cont po propranolol  - heparin gtt, plan to transition to oral anticoag   -Echo shows ejection fraction is 60 to 65%   HFpEF  - Ejection fraction is 60 to 65%, cannot assess LV dysfunction due to heart rhythm   - switched IV Lasix 40 mg twice daily to Bumex gtt   - spironolactone 12.5mg every day added   - Strict I's and O's and daily weights,  replete e-lytes per protocol  - cards on consult  Severe hyperthyroidism  Graves' disease    - Admission FT4 high with suppressed TCH  - strong family history of autoimmune thyroid disease   - Was given PTU and dexamethasone in the ED  - Endocrinology consulted  - Swtiched Methimazole 20 mg twice daily --> Methimazole daily  - FT4 normalized  - Pt needs strict endo follow up OP in 2 weeks    Microcytic anemia  - Iron labs reveal ACD  - cont to monitor H/H, transfuse for Hgb <7  - pt never had a pap smear, colonscopy etc. Reports she is mostly a vegetarian due to own taste preference.   - Dietician consulted  - fecal occult negative  - hgb 6.9 today, 1U pRBC ordered  Urinary Retention  - unclear etiology  - pt endorsing good Bms  - on diuretic, durbin inserted 8/1, spontaneously fell out 8/4  - flomax for now, dc on discharge  - urology consulted    DALTON, improving  - Cr initially trended up to 1.15 from 0.6 on admission   - in setting of lasix vs retention vs other  - Feurea was intrinsic disease  - monitor diuretic carefully, strict I/os  Deconditioning  - PT  dvt proph:    heparin gtt  Dispo: anticipate home, pending clinical course     MDM: High Sara Leiva MD  8/4/2024  9:30 PM     8/5/2024:  CARDIOLOGY  Still fatigued and with a decreased appetite but is improving. Denies dyspnea or orthopnea. No chest apin     /60 (BP Location: Right arm)  Pulse 88  Temp 98.1 °F (36.7 °C) (Oral)  Resp 16  Ht 5' 4\" (1.626 m)  Wt 203 lb 6.4 oz (92.3 kg)  SpO2 100%  BMI 34.91 kg/m²     TELEMETRY: Afib, CVR      8/5/2024 0815      Gross per 24 hour   Intake 1201.58 ml   Output 3900 ml   Net -2698.42 ml     Last 3 Weights   08/05/24 0104 203 lb 6.4 oz (92.3 kg)   08/04/24 0434 215 lb 4.8 oz (97.7 kg)   08/03/24 0445 232 lb 8 oz (105.5 kg)     Lab 08/03/24  0717 08/04/24  0936 08/05/24  0639   GLU 90 108* 88   BUN 25* 25* 23   CREATSERUM 1.07* 1.04* 0.98   EGFRCR 61 63 68   CA 9.1 8.9 9.1    137 135*   K 3.8  3.8 4.0    98 95*   CO2 31.0 34.0* 35.0*      Lab 08/03/24  0717 08/04/24  0936 08/04/24  1711 08/05/24  0639   RBC 3.57* 3.23*  --  3.61*   HGB 7.6* 6.9* 7.8* 7.8*   HCT 25.8* 23.1* 24.9* 25.3*   MCV 72.3* 71.5*  --  70.1*   MCH 21.3* 21.4*  --  21.6*   MCHC 29.5* 29.9*  --  30.8*   RDW 18.4* 18.2*  --  18.0*   NEPRELIM 3.51 2.64  --  2.53   WBC 5.9 4.7  --  5.0   .0 271.0  --  240.0      Heart rate controlled.  Diuresed -22 L since admission.  Bicarbonate starting to climb. Consider diuretic holiday if bicarb continues to climb tomorrow.  Continue diltiazem and propranolol for rate control and anticoagulation for A-fib.    L3  Garfield Padron,    August 05, 2024  2:15 PM    8/6/2024:  CARDIOLOGY  BP 98/61 (BP Location: Left arm)  Pulse 88  Temp 98.2 °F (36.8 °C) (Oral)  Resp 18  Ht 5' 4\" (1.626 m)  Wt 184 lb 9.6 oz (83.7 kg)  SpO2 95%  BMI 31.69 kg/m²     8/6/2024 0806      Gross per 24 hour   Intake 480 ml   Output 6300 ml   Net -5820 ml     Last 3 Weights   08/06/24 0606 184 lb 9.6 oz (83.7 kg)   08/05/24 0104 203 lb 6.4 oz (92.3 kg)   08/04/24 0434 215 lb 4.8 oz (97.7 kg)     Lab 08/04/24  0936 08/05/24  0639 08/06/24  0644   * 88 92   BUN 25* 23 27*   CREATSERUM 1.04* 0.98 1.17*   EGFRCR 63 68 55*   CA 8.9 9.1 9.4    135* 134*   K 3.8 4.0 4.1   CL 98 95* 92*   CO2 34.0* 35.0* 39.0*      Lab 08/04/24  0936 08/04/24  1711 08/05/24  0639 08/06/24  0644   RBC 3.23*  --  3.61* 3.64*   HGB 6.9* 7.8* 7.8* 7.9*   HCT 23.1* 24.9* 25.3* 25.8*   MCV 71.5*  --  70.1* 70.9*   MCH 21.4*  --  21.6* 21.7*   MCHC 29.9*  --  30.8* 30.6*   RDW 18.2*  --  18.0* 18.2*   NEPRELIM 2.64  --  2.53 3.00   WBC 4.7  --  5.0 5.4   .0  --  240.0 248.0     negative 26L after diuresis. Still has significant bilateral LE edema. Pt is has intravascular volume contraction. Vitals are stable. Agree with diuretic holiday to give time to mobilize extravascular fluid. Needs compression stockings but has LE  wounds, recommend tubigrips if she can tolerate.     L3  Garfield Padron,     August 06, 2024  1:54 PM    8/7/2024:  Urology   Progress Note  Blood pressure 110/57, pulse 81, temperature 98.3 °F (36.8 °C), temperature source Oral, resp. rate 18, height 5' 4\" (1.626 m), weight 181 lb 4.8 oz (82.2 kg), SpO2 98%     Component Value Date     WBC 4.9 08/07/2024     HGB 7.6 08/07/2024     HCT 24.1 08/07/2024     .0 08/07/2024     CREATSERUM 1.12 08/07/2024     BUN 28 08/07/2024      08/07/2024     K 4.3 08/07/2024     CL 93 08/07/2024     CO2 38.0 08/07/2024     GLU 85 08/07/2024     CA 9.2 08/07/2024     MG 1.7 08/07/2024 8/7/2024 1900      Gross per 24 hour   Intake 1010 ml   Output 2425 ml   Net -1415 ml     Impression:   Urine retention - likely multifactorial, ie non-ambulatory state, generalized weakness, possibly hypotonic bladder, current thyroid issues    A-fib, Thyrotoxicosis, CHF      Recommendations:  Recommend random PVR check with bladder scanner to ensure bladder residuals <250 ml   Venegas has been removed at this time  Patient initiated on oral tamsulosin per primary service  Recommend follow up as out patient prn  Urology will sign off  Liberty Francis MD  8/7/2024  8:25 PM     MEDICATIONS ADMINISTERED:  Medications 08/03/24 08/04/24 08/05/24 08/06/24 08/07/24   acetaZOLAMIDE (Diamox) 250 mg in sterile water for injection (PF) 2.5 mL IV push  Dose: 250 mg  Freq: Once Route: IV  Start: 08/05/24 1030 End: 08/05/24 1231   Admin Instructions:   Reconstitute with 5 ml sterile water to make 100 mg/ml concentration. Withdraw appropriate dose and discard the rest.      1228 PO-Given           apixaban (Eliquis) tab 5 mg  Dose: 5 mg  Freq: 2 times daily Route: OR  Start: 08/05/24 1015      1036 PO-Given   2243 MI-Given      0845 LN-Given   2201 KG-Given      0912 LN-Given   2035 KG-Given        digoxin (Lanoxin) tab 125 mcg  Dose: 125 mcg  Freq: Every other day Route: OR  Start: 08/07/24 2300    Admin Instructions:   Hold for HR less than 60 and notify physician.        1742 LN-Given         dilTIAZem (cardIZEM) tab 30 mg  Dose: 30 mg  Freq: Every 8 hours scheduled Route: OR  Start: 08/01/24 2200 End: 08/05/24 1007   Admin Instructions:   If SBP >90 and HR >60    0625 MI-Given   1443 AL-Given     2025 GO-Given       0600 GO-Given   1446 PO-Given     2026 GO-Given       0502 GO-Given   1007-D/C'd        dilTIAZem ER (Dilacor XR) 24 hr cap 120 mg  Dose: 120 mg  Freq: Daily Route: OR  Start: 08/05/24 1400   Admin Instructions:   Swallow whole.  Do not chew, break or crush.      1457 PO-Given       0845 LN-Given       0912 LN-Given         ferrous sulfate DR tab 325 mg  Dose: 325 mg  Freq: 2 times daily with meals Route: OR  Start: 08/04/24 1015   Admin Instructions:   Do not crush     1124 PO-Given   1637 PO-Given      0814 PO-Given   1653 PO-Given      0845 LN-Given   1650 LN-Given      0912 LN-Given   1703 LN-Given        magnesium oxide (Mag-Ox) tab 400 mg  Dose: 400 mg  Freq: Once Route: OR  Start: 08/07/24 0900 End: 08/07/24 0912        0912 LN-Given         magnesium oxide (Mag-Ox) tab 400 mg  Dose: 400 mg  Freq: Once Route: OR  Start: 08/06/24 1230 End: 08/06/24 1414       1414 LN-Given          magnesium oxide (Mag-Ox) tab 800 mg  Dose: 800 mg  Freq: Once Route: OR  Start: 08/05/24 0930 End: 08/05/24 1020      1020 PO-Given           magnesium oxide (Mag-Ox) tab 800 mg  Dose: 800 mg  Freq: Once Route: OR  Start: 08/04/24 1045 End: 08/04/24 1124     1124 PO-Given            magnesium oxide (Mag-Ox) tab 800 mg  Dose: 800 mg  Freq: Once Route: OR  Start: 08/03/24 0830 End: 08/03/24 0925    0925 AL-Given             methIMAzole (Tapazole) tab 20 mg  Dose: 20 mg  Freq: Daily Route: OR  Start: 08/01/24 0930   Admin Instructions:   CAUTION: HAZARDOUS DRUG    0924 AL-Given       0853 PO-Given       0814 PO-Given       0845 LN-Given       0912 LN-Given         midodrine (ProAmatine) tab 5 mg  Dose: 5  mg  Freq: 3 times daily Route: OR  Start: 08/07/24 1730   Admin Instructions:   Not recommended to be administered within 4 hours of bedtime        1742 LN-Given         propranolol (Inderal) tab 20 mg  Dose: 20 mg  Freq: 3 times daily Route: OR  Start: 07/30/24 1615 End: 08/07/24 1718   Admin Instructions:   Hold for SBP < 95 and/or HR < 55    0925 AL-Given   1645 AL-Given     2025 GO-Given       0853 PO-Given   1637 PO-Given     2027 GO-Given       0814 PO-Given   1653 PO-Given     2243 MI-Given       (0845 LN)-Not Given   (1650 LN)-Not Given     2201 KG-Given       0912 LN-Given   1540 LN-Given     1718-D/C'd       propranolol (Inderal) tab 30 mg  Dose: 30 mg  Freq: 3 times daily Route: OR  Start: 08/07/24 2100   Admin Instructions:   Hold for SBP < 95 and/or HR < 55        2035 KG-Given         sodium chloride 0.9% infusion  Freq: Once Route: IV  Start: 08/04/24 1115 End: 08/04/24 1417   Admin Instructions:   To standard blood administration set with integral filter. Change every 4 hours or after 2 units, whichever comes first. ALERT: NEVER mix any medication or solution with blood or blood products.  Run at KVO rate     1417 PO-New Bag            spironolactone (Aldactone) partial tab 12.5 mg  Dose: 12.5 mg  Freq: Daily Route: OR  Start: 08/02/24 0900 End: 08/07/24 1718   Admin Instructions:   CAUTION: HAZARDOUS DRUG    0925 AL-Given       0853 PO-Given       0814 PO-Given       0834 SM-Held by provider [C]   0930 SM     2130 SM-Unheld by provider [C]       (0930 LN)-Not Given   1000 SM-Held by provider [C]     1718 AG-Unheld by provider   1718-D/C'd      tamsulosin (Flomax) cap 0.4 mg  Dose: 0.4 mg  Freq: Daily at 0700 Route: OR  Start: 08/01/24 1015   Admin Instructions:   Give on an empty stomach. Hold tube feedings 1 hour before AND after.    0625 MI-Given       0600 GO-Given       0502 GO-Given   (0700 GO)-Not Given [C]      0604 MI-Given       0512 KG-Given           bumetanide (Bumex) 12.5 mg in 50 mL  infusion  Rate: 4 mL/hr Dose: 1 mg/hr  Freq: Continuous Route: IV  Last Dose: Stopped (08/06/24 0829)  Start: 08/01/24 1730 End: 08/09/24 0951    0534 MI-New Bag   1820 AL-New Bag      0556 GO-New Bag   2026 GO-New Bag      0816 PO-New Bag   2009 MI-New Bag      0829 LN-Stopped [C]   0829 SM-Held by provider [C]         heparin (Porcine) 85050 units/250mL infusion ACS/AFIB CONTINUOUS  Rate: 2-30 mL/hr Dose: 200-3000 Units/hr  Freq: Continuous Route: IV  Last Dose: Stopped (08/05/24 0920)  Start: 07/30/24 0030 End: 08/05/24 1011   Admin Instructions:   Heparin for ACS/Afib    0900 MI/AL-Handoff   1001 AL/EM-New Bag     1915 AL/GO-Handoff       0712 GO/PO-Handoff   1202 PO/KB-New Bag     1928 PO/GO-Handoff       0706 GO/PO-Handoff   0920 PO-Paused     1011-D/C'd  1022 PO-Stopped             Vitals (last day)       Date/Time Temp Pulse Resp BP SpO2 Weight O2 Device O2 Flow Rate (L/min) [facility]    08/09/24 1609 98.3 °F (36.8 °C) 76 16 106/60 99 % -- None (Room air) -- CS    08/09/24 1326 -- 70 -- 105/60 99 % -- None (Room air) --     08/09/24 1224 -- 88 -- 97/57 -- -- -- -- CS    08/09/24 0956 98.1 °F (36.7 °C) 87 18 104/64 97 % -- None (Room air) -- CS    08/09/24 0638 -- 72 18 113/64 -- -- -- -- AS    08/09/24 0412 -- -- -- -- -- 184 lb 4.8 oz (83.6 kg) -- -- RA    08/09/24 0215 99 °F (37.2 °C) 73 18 102/62 99 % -- None (Room air) -- AS    08/08/24 2122 98.6 °F (37 °C) 84 18 104/64 99 % -- None (Room air) -- AS    08/08/24 1700 -- 69 20 108/62 96 % -- None (Room air) -- LP    08/08/24 1220 -- 92 -- 108/52 -- -- -- -- LP    08/08/24 0900 98 °F (36.7 °C) 91 18 111/64 97 % -- None (Room air) -- LP    08/08/24 0606 -- 76 -- 108/62 98 % 183 lb 3.2 oz (83.1 kg) None (Room air) -- KG    08/08/24 0126 98.4 °F (36.9 °C) 84 18 101/55 97 % -- None (Room air) -- KG     08/07/24 2033 98.4 °F (36.9 °C) 87 20 98/65 98 % -- None (Room air) -- KG   08/07/24 1742 -- 81 -- -- -- -- -- -- LN   08/07/24 1741 -- 81 -- 110/57 -- -- -- -- LN    08/07/24 1538 98.3 °F (36.8 °C) 82 18 100/60 98 % -- None (Room air) -- LN   08/07/24 0907 98.2 °F (36.8 °C) 96 18 111/55 96 % -- None (Room air) -- LN   08/07/24 0511 -- 94 -- 110/64 95 % -- None (Room air) -- KG   08/07/24 0449 -- -- -- -- -- 181 lb 4.8 oz (82.2 kg) -- -- LR   08/07/24 0219 98.5 °F (36.9 °C) 87 18 93/53 97 % -- None (Room air) -- KG   08/06/24 2159 98.1 °F (36.7 °C) 89 16 101/59 96 % -- None (Room air) -- KG   08/06/24 1901 -- 88 -- -- -- -- -- -- GT   08/06/24 1649 -- 87 -- 96/51 99 % -- None (Room air) -- LN   08/06/24 1413 98.6 °F (37 °C) 84 18 98/64 97 % -- None (Room air) -- LN   08/06/24 0841 98.4 °F (36.9 °C) 83 18 95/54 96 % -- None (Room air) -- LN   08/06/24 0606 -- -- -- -- -- 184 lb 9.6 oz (83.7 kg) -- -- EL   08/06/24 0603 98.2 °F (36.8 °C) 88 18 98/61 95 % -- -- -- MI   08/05/24 2241 -- 77 -- 112/71 -- -- -- -- MI   08/05/24 2011 98.4 °F (36.9 °C) 82 18 93/51 95 % -- None (Room air) -- MI   08/05/24 1901 -- 76 -- -- -- -- -- -- GT   08/05/24 1654 -- 85 18 109/49 100 % -- None (Room air) -- PO   08/05/24 1456 98 °F (36.7 °C) 82 18 105/67 100 % -- None (Room air) -- PO   08/05/24 0813 98.1 °F (36.7 °C) 88 16 108/60 100 % -- None (Room air) -- PO   08/05/24 0447 98.5 °F (36.9 °C) 96 14 104/66 98 % -- None (Room air) -- GO   08/05/24 0104 -- -- -- -- -- 203 lb 6.4 oz (92.3 kg) -- -- TB   08/04/24 2024 98.7 °F (37.1 °C) 87 15 106/57 96 % -- None (Room air) -- GO   08/04/24 2000 -- 93 -- -- -- -- -- -- KD   08/04/24 1645 98.4 °F (36.9 °C) 99 16 117/62 99 % -- -- -- PO   08/04/24 1630 98.4 °F (36.9 °C) 84 18 117/62 98 % -- -- -- PO   08/04/24 1530 98.4 °F (36.9 °C) 90 18 100/61 99 % -- -- -- PO   08/04/24 1430 98.5 °F (36.9 °C) 92 18 112/66 100 % -- -- -- PO   08/04/24 1413 98.6 °F (37 °C) 91 18 112/64 99 % -- -- -- PO   08/04/24 0850 98.9 °F (37.2 °C) 96 18 114/67 97 % -- None (Room air) -- PO   08/04/24 0434 98.7 °F (37.1 °C) 94 15 112/62 97 % 215 lb 4.8 oz (97.7 kg) None (Room air) --  GO   08/03/24 2023 98.6 °F (37 °C) 101 14 113/59 98 % -- None (Room air) -- GO   08/03/24 2000 -- 88 -- -- -- -- -- -- KD   08/03/24 1818 -- 95 -- 102/61 98 % -- None (Room air) -- AL   08/03/24 1644 -- 90 -- 121/60 98 % -- None (Room air) -- AL   08/03/24 1440 97.8 °F (36.6 °C) 90 18 100/57 100 % -- None (Room air) -- AL   08/03/24 0922 98.4 °F (36.9 °C) 86 18 112/55 -- -- -- -- AL   08/03/24 0623 98.8 °F (37.1 °C) 92 20 112/66 100 % -- -- -- MI   08/03/24 0445 -- -- -- -- -- 232 lb 8 oz (105.5 kg) -- -- LR       Blood Transfusion Record       Product Unit Status Volume Start End            Transfuse RBC       24  [id number]  [id number] Completed 08/04/24 1637 285.58 mL 08/04/24 1412 08/04/24 1637           FOR CONTINUED STAY REVIEW/APPROVAL OF INPT ADMISSION

## 2024-08-09 NOTE — PROGRESS NOTES
[facility]  Progress Note     Shubham Zurita  : 1970    Status: Inpatient  Day #: 11    Attending: Matt Sierra MD  PCP: No primary care provider on file.     Assessment and Plan:    Atrial fibrillation with rapid ventricular response  Due to hyperthyroidism  - TSH low, started on methimazole  - cards on consult  - off dilt gtt  - cont diltiazem po, digoxin, inderal  - heparin gtt --> oral anticoag Eliquis  - Echo shows ejection fraction is 60 to 65%     HFpEF  - Ejection fraction is 60 to 65%, cannot assess LV dysfunction due to heart rhythm   - was on bumex drip - IV diuresis held -> now on lasix PO  - spironolactone 12.5mg every day held  - Strict I's and O's and daily weights, replete e-lytes per protocol  - cards on consult     Severe hyperthyroidism  Graves' disease    - Admission FT4 high with suppressed TCH  - strong family history of autoimmune thyroid disease   - Was given PTU and dexamethasone in the ED  - Endocrinology consulted  - Swtiched Methimazole 20 mg twice daily --> Methimazole daily  - FT4 normalized  - Pt needs strict endo follow up OP in 2 weeks      Microcytic anemia  - Iron labs reveal ACD  - cont to monitor H/H, transfuse for Hgb <7  - pt never had a pap smear, colonscopy etc. Reports she is mostly a vegetarian due to own taste preference.   - Dietician consulted  - fecal occult negative  - hgb 6.9  required 1U pRBC transfusion with appropriate response     Urinary Retention  - unclear etiology  - pt endorsing good BMs  - on diuretic, durbin inserted , spontaneously fell out   - flomax for now, dc on discharge  - urology consulted      DALTON   - Cr initially trended up to 1.15 from 0.6 on admission   - in setting of lasix vs retention vs other  - Feurea was intrinsic disease  - monitor diuretic carefully, strict I/os     Deconditioning  - PT, OOB     Dietitian Malnutrition Assessment        Evaluation for Malnutrition: Criteria for non-severe malnutrition  diagnosis-         chronic illness related to   Energy intake less than 75% for greater than 1 month., Body fat mild depletion., Muscle mass mild depletion.       RD Malnutrition Care Plan: Encouraged small frequent meals with emphasis on high calorie/high protein., Monitor need for ONS (oral nutritional supplements).    Body Fat/Muscle Mass: Mild depletion body fat., Mild depletion muscle mass. triceps region. temple region., clavicle region.    Physician Assessment    Patient has a diagnosis of moderate malnutrition       DVT Mechanical Prophylaxis:        DVT Pharmacologic Prophylaxis   Medication    apixaban (Eliquis) tab 5 mg               Subjective:      Initial Chief Complaint:  wt gain and SOB    Feels better. No SOB, no CP.     10 point ROS completed and was negative, except for pertinent positive and negatives stated in subjective.      Objective:      Temp:  [98.1 °F (36.7 °C)-99 °F (37.2 °C)] 98.1 °F (36.7 °C)  Pulse:  [69-88] 70  Resp:  [18-20] 18  BP: ()/(57-64) 105/60  SpO2:  [96 %-99 %] 97 %  General:  Alert, no distress  HEENT:  Normocephalic, atraumatic  Cardiac:  Regular rate, regular rhythm  Pulmonary:  Clear to auscultation bilaterally, respirations unlabored  Gastrointestinal:  Soft, non-tender, normal bowel sounds  Musculoskeletal:  No joint swelling  Extremities:  b/l LE edema  Neurologic:  nonfocal  Psychiatric:  Normal affect, calm and appropriate    Intake/Output Summary (Last 24 hours) at 8/9/2024 1416  Last data filed at 8/9/2024 1327  Gross per 24 hour   Intake 1275 ml   Output 700 ml   Net 575 ml         Recent Labs   Lab 08/07/24  0657 08/08/24  0600 08/09/24  0814   WBC 4.9 4.9 5.2   HGB 7.6* 7.5* 7.8*   HCT 24.1* 24.5* 26.0*   .0 229.0 225.0   RBC 3.38* 3.43* 3.61*   MCV 71.3* 71.4* 72.0*   MCH 22.5* 21.9* 21.6*   MCHC 31.5 30.6* 30.0*   RDW 18.4* 18.3* 18.4*   NEPRELIM 2.42 2.65 3.07     Recent Labs   Lab 08/03/24  0717 08/04/24  0936 08/05/24  0639 08/06/24  0644  08/07/24  0658 08/08/24  0600 08/09/24  0814   BUN 25* 25* 23   < > 28* 26* 26*   CREATSERUM 1.07* 1.04* 0.98   < > 1.12* 1.05* 1.01   CA 9.1 8.9 9.1   < > 9.2 8.7 8.8    137 135*   < > 134* 133* 131*   K 3.8 3.8 4.0   < > 4.3 3.8 3.7  3.7    98 95*   < > 93* 94* 96*   CO2 31.0 34.0* 35.0*   < > 38.0* 35.0* 32.0   GLU 90 108* 88   < > 85 90 141*   MG 1.5* 1.4* 1.4*   < > 1.7 1.8 1.8   PTT 63.1* 72.3* 101.8*  --   --   --   --     < > = values in this interval not displayed.       No results found.      Medications:   furosemide  40 mg Oral Daily    propranolol  30 mg Oral TID    midodrine  5 mg Oral TID    digoxin  125 mcg Oral QOD    dilTIAZem ER  120 mg Oral Daily    apixaban  5 mg Oral BID    ferrous sulfate  325 mg Oral BID with meals    methIMAzole  20 mg Oral Daily    tamsulosin  0.4 mg Oral Daily @ 0700      PRN Meds:   polyethylene glycol (PEG 3350)    dilTIAZem    acetaminophen    ondansetron    prochlorperazine    temazepam    Supplementary Documentation:        MDM High. Time spent on chart/note review, review of labs/imaging, discussion with patient, physical exam, discussion with staff, consultants, coordinating care, writing progress note, discussion of plan of care.      Matt Sierra MD

## 2024-08-10 LAB
ANION GAP SERPL CALC-SCNC: 4 MMOL/L (ref 0–18)
BASOPHILS # BLD AUTO: 0.07 X10(3) UL (ref 0–0.2)
BASOPHILS NFR BLD AUTO: 1.4 %
BUN BLD-MCNC: 21 MG/DL (ref 9–23)
BUN/CREAT SERPL: 22.1 (ref 10–20)
CALCIUM BLD-MCNC: 8.9 MG/DL (ref 8.7–10.4)
CHLORIDE SERPL-SCNC: 97 MMOL/L (ref 98–112)
CO2 SERPL-SCNC: 31 MMOL/L (ref 21–32)
CREAT BLD-MCNC: 0.95 MG/DL
DEPRECATED RDW RBC AUTO: 49.2 FL (ref 35.1–46.3)
EGFRCR SERPLBLD CKD-EPI 2021: 71 ML/MIN/1.73M2 (ref 60–?)
EOSINOPHIL # BLD AUTO: 0.32 X10(3) UL (ref 0–0.7)
EOSINOPHIL NFR BLD AUTO: 6.3 %
ERYTHROCYTE [DISTWIDTH] IN BLOOD BY AUTOMATED COUNT: 18.6 % (ref 11–15)
GLUCOSE BLD-MCNC: 101 MG/DL (ref 70–99)
HCT VFR BLD AUTO: 27.2 %
HGB BLD-MCNC: 8.2 G/DL
IMM GRANULOCYTES # BLD AUTO: 0.01 X10(3) UL (ref 0–1)
IMM GRANULOCYTES NFR BLD: 0.2 %
LYMPHOCYTES # BLD AUTO: 1.51 X10(3) UL (ref 1–4)
LYMPHOCYTES NFR BLD AUTO: 29.7 %
MAGNESIUM SERPL-MCNC: 2 MG/DL (ref 1.6–2.6)
MCH RBC QN AUTO: 22 PG (ref 26–34)
MCHC RBC AUTO-ENTMCNC: 30.1 G/DL (ref 31–37)
MCV RBC AUTO: 73.1 FL
MONOCYTES # BLD AUTO: 0.3 X10(3) UL (ref 0.1–1)
MONOCYTES NFR BLD AUTO: 5.9 %
NEUTROPHILS # BLD AUTO: 2.88 X10 (3) UL (ref 1.5–7.7)
NEUTROPHILS # BLD AUTO: 2.88 X10(3) UL (ref 1.5–7.7)
NEUTROPHILS NFR BLD AUTO: 56.5 %
OSMOLALITY SERPL CALC.SUM OF ELEC: 277 MOSM/KG (ref 275–295)
PLATELET # BLD AUTO: 231 10(3)UL (ref 150–450)
POTASSIUM SERPL-SCNC: 3.8 MMOL/L (ref 3.5–5.1)
POTASSIUM SERPL-SCNC: 3.8 MMOL/L (ref 3.5–5.1)
RBC # BLD AUTO: 3.72 X10(6)UL
SODIUM SERPL-SCNC: 132 MMOL/L (ref 136–145)
WBC # BLD AUTO: 5.1 X10(3) UL (ref 4–11)

## 2024-08-10 PROCEDURE — 99233 SBSQ HOSP IP/OBS HIGH 50: CPT | Performed by: HOSPITALIST

## 2024-08-10 NOTE — PROGRESS NOTES
[facility]  Progress Note     Shubham Zurita  : 1970    Status: Inpatient  Day #: 12    Attending: Matt Sierra MD  PCP: No primary care provider on file.     Assessment and Plan:    Atrial fibrillation with rapid ventricular response  Due to hyperthyroidism  - TSH low, started on methimazole  - cards on consult  - off dilt gtt  - cont diltiazem po, digoxin, inderal  - heparin gtt --> oral anticoag Eliquis  - Echo shows ejection fraction is 60 to 65%     HFpEF  - Ejection fraction is 60 to 65%, cannot assess LV dysfunction due to heart rhythm   - was on bumex drip - IV diuresis held -> now on lasix PO  - spironolactone 12.5mg every day held  - Strict I's and O's and daily weights, replete e-lytes per protocol  - cards on consult     Severe hyperthyroidism  Graves' disease    - Admission FT4 high with suppressed TCH  - strong family history of autoimmune thyroid disease   - Was given PTU and dexamethasone in the ED  - Endocrinology consulted  - Swtiched Methimazole 20 mg twice daily --> Methimazole daily  - FT4 normalized  - Pt needs strict endo follow up OP in 2 weeks      Microcytic anemia  - Iron labs reveal ACD  - cont to monitor H/H, transfuse for Hgb <7  - pt never had a pap smear, colonscopy etc. Reports she is mostly a vegetarian due to own taste preference.   - Dietician consulted  - fecal occult negative  - hgb 6.9  required 1U pRBC transfusion with appropriate response     Urinary Retention  - unclear etiology  - pt endorsing good BMs  - on diuretic, durbin inserted , spontaneously fell out   - flomax for now, dc on discharge  - urology consulted      DALTON   - Cr initially trended up to 1.15 from 0.6 on admission   - in setting of lasix vs retention vs other  - Feurea was intrinsic disease  - monitor diuretic carefully, strict I/os     Deconditioning  - PT, OOB     Dietitian Malnutrition Assessment        Evaluation for Malnutrition: Criteria for non-severe malnutrition  diagnosis-         chronic illness related to   Energy intake less than 75% for greater than 1 month., Body fat mild depletion., Muscle mass mild depletion.       RD Malnutrition Care Plan: Encouraged small frequent meals with emphasis on high calorie/high protein., Monitor need for ONS (oral nutritional supplements).    Body Fat/Muscle Mass: Mild depletion body fat., Mild depletion muscle mass. triceps region. temple region., clavicle region.    Physician Assessment    Patient has a diagnosis of moderate malnutrition       DVT Mechanical Prophylaxis:        DVT Pharmacologic Prophylaxis   Medication    apixaban (Eliquis) tab 5 mg               Subjective:      Initial Chief Complaint:  wt gain and SOB    Feels better. No SOB, no CP.     10 point ROS completed and was negative, except for pertinent positive and negatives stated in subjective.      Objective:      Temp:  [98.3 °F (36.8 °C)-98.6 °F (37 °C)] 98.5 °F (36.9 °C)  Pulse:  [67-83] 72  Resp:  [16-18] 18  BP: ()/(53-64) 98/59  SpO2:  [97 %-99 %] 97 %  General:  Alert, no distress  HEENT:  Normocephalic, atraumatic  Cardiac:  Regular rate, regular rhythm  Pulmonary:  Clear to auscultation bilaterally, respirations unlabored  Gastrointestinal:  Soft, non-tender, normal bowel sounds  Musculoskeletal:  No joint swelling  Extremities:  b/l LE edema  Neurologic:  nonfocal  Psychiatric:  Normal affect, calm and appropriate    Intake/Output Summary (Last 24 hours) at 8/10/2024 1329  Last data filed at 8/10/2024 0914  Gross per 24 hour   Intake 520 ml   Output 700 ml   Net -180 ml         Recent Labs   Lab 08/08/24  0600 08/09/24  0814 08/10/24  0731   WBC 4.9 5.2 5.1   HGB 7.5* 7.8* 8.2*   HCT 24.5* 26.0* 27.2*   .0 225.0 231.0   RBC 3.43* 3.61* 3.72*   MCV 71.4* 72.0* 73.1*   MCH 21.9* 21.6* 22.0*   MCHC 30.6* 30.0* 30.1*   RDW 18.3* 18.4* 18.6*   NEPRELIM 2.65 3.07 2.88     Recent Labs   Lab 08/04/24  0936 08/05/24  0639 08/06/24  0644 08/08/24  0600  08/09/24  0814 08/10/24  0731   BUN 25* 23   < > 26* 26* 21   CREATSERUM 1.04* 0.98   < > 1.05* 1.01 0.95   CA 8.9 9.1   < > 8.7 8.8 8.9    135*   < > 133* 131* 132*   K 3.8 4.0   < > 3.8 3.7  3.7 3.8  3.8   CL 98 95*   < > 94* 96* 97*   CO2 34.0* 35.0*   < > 35.0* 32.0 31.0   * 88   < > 90 141* 101*   MG 1.4* 1.4*   < > 1.8 1.8 2.0   PTT 72.3* 101.8*  --   --   --   --     < > = values in this interval not displayed.       No results found.      Medications:   furosemide  40 mg Oral Daily    propranolol  30 mg Oral TID    midodrine  5 mg Oral TID    digoxin  125 mcg Oral QOD    dilTIAZem ER  120 mg Oral Daily    apixaban  5 mg Oral BID    ferrous sulfate  325 mg Oral BID with meals    methIMAzole  20 mg Oral Daily    tamsulosin  0.4 mg Oral Daily @ 0700      PRN Meds:   polyethylene glycol (PEG 3350)    dilTIAZem    acetaminophen    ondansetron    prochlorperazine    temazepam    Supplementary Documentation:        MDM High. Time spent on chart/note review, review of labs/imaging, discussion with patient, physical exam, discussion with staff, consultants, coordinating care, writing progress note, discussion of plan of care.      Matt Sierra MD

## 2024-08-10 NOTE — PLAN OF CARE
Problem: Patient Centered Care  Goal: Patient preferences are identified and integrated in the patient's plan of care  Description: Interventions:  - What would you like us to know as we care for you? From home with   - Provide timely, complete, and accurate information to patient/family  - Incorporate patient and family knowledge, values, beliefs, and cultural backgrounds into the planning and delivery of care  - Encourage patient/family to participate in care and decision-making at the level they choose  - Honor patient and family perspectives and choices  Outcome: Progressing     Problem: Patient/Family Goals  Goal: Patient/Family Long Term Goal  Description: Patient's Long Term Goal: go home    Interventions:  - follow md orders  - See additional Care Plan goals for specific interventions  Outcome: Progressing  Goal: Patient/Family Short Term Goal  Description: Patient's Short Term Goal: remain safe in hospital    Interventions:   - monitor on tele  - heparin gtt  - See additional Care Plan goals for specific interventions  Outcome: Progressing     Problem: CARDIOVASCULAR - ADULT  Goal: Maintains optimal cardiac output and hemodynamic stability  Description: INTERVENTIONS:  - Monitor vital signs, rhythm, and trends  - Monitor for bleeding, hypotension and signs of decreased cardiac output  - Evaluate effectiveness of vasoactive medications to optimize hemodynamic stability  - Monitor arterial and/or venous puncture sites for bleeding and/or hematoma  - Assess quality of pulses, skin color and temperature  - Assess for signs of decreased coronary artery perfusion - ex. Angina  - Evaluate fluid balance, assess for edema, trend weights  Outcome: Progressing  Goal: Absence of cardiac arrhythmias or at baseline  Description: INTERVENTIONS:  - Continuous cardiac monitoring, monitor vital signs, obtain 12 lead EKG if indicated  - Evaluate effectiveness of antiarrhythmic and heart rate control medications as  ordered  - Initiate emergency measures for life threatening arrhythmias  - Monitor electrolytes and administer replacement therapy as ordered  Outcome: Progressing     Problem: SAFETY ADULT - FALL  Goal: Free from fall injury  Description: INTERVENTIONS:  - Assess pt frequently for physical needs  - Identify cognitive and physical deficits and behaviors that affect risk of falls.  - [place] fall precautions as indicated by assessment.  - Educate pt/family on patient safety including physical limitations  - Instruct pt to call for assistance with activity based on assessment  - Modify environment to reduce risk of injury  - Provide assistive devices as appropriate  - Consider OT/PT consult to assist with strengthening/mobility  - Encourage toileting schedule  Outcome: Progressing     Problem: DISCHARGE PLANNING  Goal: Discharge to home or other facility with appropriate resources  Description: INTERVENTIONS:  - Identify barriers to discharge w/pt and caregiver  - Include patient/family/discharge partner in discharge planning  - Arrange for needed discharge resources and transportation as appropriate  - Identify discharge learning needs (meds, wound care, etc)  - Arrange for interpreters to assist at discharge as needed  - Consider post-discharge preferences of patient/family/discharge partner  - Complete POLST form as appropriate  - Assess patient's ability to be responsible for managing their own health  - Refer to Case Management Department for coordinating discharge planning if the patient needs post-hospital services based on physician/LIP order or complex needs related to functional status, cognitive ability or social support system  Outcome: Progressing     Problem: PAIN - ADULT  Goal: Verbalizes/displays adequate comfort level or patient's stated pain goal  Description: INTERVENTIONS:  - Encourage pt to monitor pain and request assistance  - Assess pain using appropriate pain scale  - Administer analgesics  based on type and severity of pain and evaluate response  - Implement non-pharmacological measures as appropriate and evaluate response  - Consider cultural and social influences on pain and pain management  - Manage/alleviate anxiety  - Utilize distraction and/or relaxation techniques  - Monitor for opioid side effects  - Notify MD/LIP if interventions unsuccessful or patient reports new pain  - Anticipate increased pain with activity and pre-medicate as appropriate  Outcome: Progressing

## 2024-08-10 NOTE — PLAN OF CARE
Problem: Patient Centered Care  Goal: Patient preferences are identified and integrated in the patient's plan of care  Description: Interventions:  - What would you like us to know as we care for you? From home with   - Provide timely, complete, and accurate information to patient/family  - Incorporate patient and family knowledge, values, beliefs, and cultural backgrounds into the planning and delivery of care  - Encourage patient/family to participate in care and decision-making at the level they choose  - Honor patient and family perspectives and choices  Outcome: Progressing     Problem: Patient/Family Goals  Goal: Patient/Family Long Term Goal  Description: Patient's Long Term Goal: go home    Interventions:  - follow md orders  - See additional Care Plan goals for specific interventions  Outcome: Progressing  Goal: Patient/Family Short Term Goal  Description: Patient's Short Term Goal: remain safe in hospital    Interventions:   - monitor on tele  - heparin gtt  - See additional Care Plan goals for specific interventions  Outcome: Progressing     Problem: CARDIOVASCULAR - ADULT  Goal: Maintains optimal cardiac output and hemodynamic stability  Description: INTERVENTIONS:  - Monitor vital signs, rhythm, and trends  - Monitor for bleeding, hypotension and signs of decreased cardiac output  - Evaluate effectiveness of vasoactive medications to optimize hemodynamic stability  - Monitor arterial and/or venous puncture sites for bleeding and/or hematoma  - Assess quality of pulses, skin color and temperature  - Assess for signs of decreased coronary artery perfusion - ex. Angina  - Evaluate fluid balance, assess for edema, trend weights  Outcome: Progressing  Goal: Absence of cardiac arrhythmias or at baseline  Description: INTERVENTIONS:  - Continuous cardiac monitoring, monitor vital signs, obtain 12 lead EKG if indicated  - Evaluate effectiveness of antiarrhythmic and heart rate control medications as  ordered  - Initiate emergency measures for life threatening arrhythmias  - Monitor electrolytes and administer replacement therapy as ordered  Outcome: Progressing     Problem: SAFETY ADULT - FALL  Goal: Free from fall injury  Description: INTERVENTIONS:  - Assess pt frequently for physical needs  - Identify cognitive and physical deficits and behaviors that affect risk of falls.  - [place] fall precautions as indicated by assessment.  - Educate pt/family on patient safety including physical limitations  - Instruct pt to call for assistance with activity based on assessment  - Modify environment to reduce risk of injury  - Provide assistive devices as appropriate  - Consider OT/PT consult to assist with strengthening/mobility  - Encourage toileting schedule  Outcome: Progressing     Problem: DISCHARGE PLANNING  Goal: Discharge to home or other facility with appropriate resources  Description: INTERVENTIONS:  - Identify barriers to discharge w/pt and caregiver  - Include patient/family/discharge partner in discharge planning  - Arrange for needed discharge resources and transportation as appropriate  - Identify discharge learning needs (meds, wound care, etc)  - Arrange for interpreters to assist at discharge as needed  - Consider post-discharge preferences of patient/family/discharge partner  - Complete POLST form as appropriate  - Assess patient's ability to be responsible for managing their own health  - Refer to Case Management Department for coordinating discharge planning if the patient needs post-hospital services based on physician/LIP order or complex needs related to functional status, cognitive ability or social support system  Outcome: Progressing     Problem: PAIN - ADULT  Goal: Verbalizes/displays adequate comfort level or patient's stated pain goal  Description: INTERVENTIONS:  - Encourage pt to monitor pain and request assistance  - Assess pain using appropriate pain scale  - Administer analgesics  based on type and severity of pain and evaluate response  - Implement non-pharmacological measures as appropriate and evaluate response  - Consider cultural and social influences on pain and pain management  - Manage/alleviate anxiety  - Utilize distraction and/or relaxation techniques  - Monitor for opioid side effects  - Notify MD/LIP if interventions unsuccessful or patient reports new pain  - Anticipate increased pain with activity and pre-medicate as appropriate  Outcome: Progressing

## 2024-08-11 LAB
ANION GAP SERPL CALC-SCNC: 3 MMOL/L (ref 0–18)
BUN BLD-MCNC: 20 MG/DL (ref 9–23)
BUN/CREAT SERPL: 22 (ref 10–20)
CALCIUM BLD-MCNC: 8.6 MG/DL (ref 8.7–10.4)
CHLORIDE SERPL-SCNC: 97 MMOL/L (ref 98–112)
CO2 SERPL-SCNC: 33 MMOL/L (ref 21–32)
CREAT BLD-MCNC: 0.91 MG/DL
DEPRECATED RDW RBC AUTO: 48.9 FL (ref 35.1–46.3)
EGFRCR SERPLBLD CKD-EPI 2021: 75 ML/MIN/1.73M2 (ref 60–?)
ERYTHROCYTE [DISTWIDTH] IN BLOOD BY AUTOMATED COUNT: 18.6 % (ref 11–15)
GLUCOSE BLD-MCNC: 79 MG/DL (ref 70–99)
HCT VFR BLD AUTO: 27 %
HGB BLD-MCNC: 8.1 G/DL
MCH RBC QN AUTO: 21.8 PG (ref 26–34)
MCHC RBC AUTO-ENTMCNC: 30 G/DL (ref 31–37)
MCV RBC AUTO: 72.6 FL
OSMOLALITY SERPL CALC.SUM OF ELEC: 278 MOSM/KG (ref 275–295)
PLATELET # BLD AUTO: 238 10(3)UL (ref 150–450)
POTASSIUM SERPL-SCNC: 4.2 MMOL/L (ref 3.5–5.1)
RBC # BLD AUTO: 3.72 X10(6)UL
SODIUM SERPL-SCNC: 133 MMOL/L (ref 136–145)
WBC # BLD AUTO: 4.2 X10(3) UL (ref 4–11)

## 2024-08-11 PROCEDURE — 99233 SBSQ HOSP IP/OBS HIGH 50: CPT | Performed by: HOSPITALIST

## 2024-08-11 RX ORDER — MIDODRINE HYDROCHLORIDE 5 MG/1
5 TABLET ORAL ONCE
Status: COMPLETED | OUTPATIENT
Start: 2024-08-11 | End: 2024-08-11

## 2024-08-11 RX ORDER — DILTIAZEM HYDROCHLORIDE 120 MG/1
120 CAPSULE, EXTENDED RELEASE ORAL DAILY
Qty: 30 CAPSULE | Refills: 11 | Status: SHIPPED | OUTPATIENT
Start: 2024-08-11 | End: 2024-08-13

## 2024-08-11 RX ORDER — PROPRANOLOL HYDROCHLORIDE 10 MG/1
30 TABLET ORAL 3 TIMES DAILY
Qty: 90 TABLET | Refills: 3 | Status: SHIPPED | OUTPATIENT
Start: 2024-08-11

## 2024-08-11 RX ORDER — MIDODRINE HYDROCHLORIDE 5 MG/1
10 TABLET ORAL 3 TIMES DAILY
Status: DISCONTINUED | OUTPATIENT
Start: 2024-08-11 | End: 2024-08-13

## 2024-08-11 RX ORDER — FUROSEMIDE 40 MG/1
40 TABLET ORAL DAILY
Qty: 30 TABLET | Refills: 3 | Status: SHIPPED | OUTPATIENT
Start: 2024-08-11 | End: 2024-08-14

## 2024-08-11 RX ORDER — METHIMAZOLE 10 MG/1
20 TABLET ORAL DAILY
Qty: 30 TABLET | Refills: 1 | Status: SHIPPED | OUTPATIENT
Start: 2024-08-12

## 2024-08-11 RX ORDER — MIDODRINE HYDROCHLORIDE 5 MG/1
5 TABLET ORAL 3 TIMES DAILY
Qty: 90 TABLET | Refills: 3 | Status: SHIPPED | OUTPATIENT
Start: 2024-08-11 | End: 2024-08-13

## 2024-08-11 RX ORDER — FERROUS SULFATE 325(65) MG
325 TABLET, DELAYED RELEASE (ENTERIC COATED) ORAL 2 TIMES DAILY WITH MEALS
Qty: 60 TABLET | Refills: 0 | Status: SHIPPED | OUTPATIENT
Start: 2024-08-11

## 2024-08-11 RX ORDER — DIGOXIN 125 MCG
125 TABLET ORAL EVERY OTHER DAY
Qty: 30 TABLET | Refills: 3 | Status: SHIPPED | OUTPATIENT
Start: 2024-08-11 | End: 2024-08-13

## 2024-08-11 NOTE — PLAN OF CARE
Patient denied pain or discomfort. Up ambulate with minimal assist. Transition on PO lasix daily - monitor I&Os and and daily weight. Safety precaution maintained. Plan for monitor Creatinine level and electrolytes, discharge once medically clear.     Converted to SB at 0640am.    Problem: Patient Centered Care  Goal: Patient preferences are identified and integrated in the patient's plan of care  Description: Interventions:  - What would you like us to know as we care for you? From home with   - Provide timely, complete, and accurate information to patient/family  - Incorporate patient and family knowledge, values, beliefs, and cultural backgrounds into the planning and delivery of care  - Encourage patient/family to participate in care and decision-making at the level they choose  - Honor patient and family perspectives and choices  Outcome: Progressing     Problem: Patient/Family Goals  Goal: Patient/Family Long Term Goal  Description: Patient's Long Term Goal: go home    Interventions:  - follow md orders  - See additional Care Plan goals for specific interventions  Outcome: Progressing  Goal: Patient/Family Short Term Goal  Description: Patient's Short Term Goal: remain safe in hospital    Interventions:   - monitor on tele  - heparin gtt  - See additional Care Plan goals for specific interventions  Outcome: Progressing     Problem: CARDIOVASCULAR - ADULT  Goal: Maintains optimal cardiac output and hemodynamic stability  Description: INTERVENTIONS:  - Monitor vital signs, rhythm, and trends  - Monitor for bleeding, hypotension and signs of decreased cardiac output  - Evaluate effectiveness of vasoactive medications to optimize hemodynamic stability  - Monitor arterial and/or venous puncture sites for bleeding and/or hematoma  - Assess quality of pulses, skin color and temperature  - Assess for signs of decreased coronary artery perfusion - ex. Angina  - Evaluate fluid balance, assess for edema, trend  weights  Outcome: Progressing  Goal: Absence of cardiac arrhythmias or at baseline  Description: INTERVENTIONS:  - Continuous cardiac monitoring, monitor vital signs, obtain 12 lead EKG if indicated  - Evaluate effectiveness of antiarrhythmic and heart rate control medications as ordered  - Initiate emergency measures for life threatening arrhythmias  - Monitor electrolytes and administer replacement therapy as ordered  Outcome: Progressing     Problem: SAFETY ADULT - FALL  Goal: Free from fall injury  Description: INTERVENTIONS:  - Assess pt frequently for physical needs  - Identify cognitive and physical deficits and behaviors that affect risk of falls.  - [place] fall precautions as indicated by assessment.  - Educate pt/family on patient safety including physical limitations  - Instruct pt to call for assistance with activity based on assessment  - Modify environment to reduce risk of injury  - Provide assistive devices as appropriate  - Consider OT/PT consult to assist with strengthening/mobility  - Encourage toileting schedule  Outcome: Progressing     Problem: DISCHARGE PLANNING  Goal: Discharge to home or other facility with appropriate resources  Description: INTERVENTIONS:  - Identify barriers to discharge w/pt and caregiver  - Include patient/family/discharge partner in discharge planning  - Arrange for needed discharge resources and transportation as appropriate  - Identify discharge learning needs (meds, wound care, etc)  - Arrange for interpreters to assist at discharge as needed  - Consider post-discharge preferences of patient/family/discharge partner  - Complete POLST form as appropriate  - Assess patient's ability to be responsible for managing their own health  - Refer to Case Management Department for coordinating discharge planning if the patient needs post-hospital services based on physician/LIP order or complex needs related to functional status, cognitive ability or social support  system  Outcome: Progressing     Problem: PAIN - ADULT  Goal: Verbalizes/displays adequate comfort level or patient's stated pain goal  Description: INTERVENTIONS:  - Encourage pt to monitor pain and request assistance  - Assess pain using appropriate pain scale  - Administer analgesics based on type and severity of pain and evaluate response  - Implement non-pharmacological measures as appropriate and evaluate response  - Consider cultural and social influences on pain and pain management  - Manage/alleviate anxiety  - Utilize distraction and/or relaxation techniques  - Monitor for opioid side effects  - Notify MD/LIP if interventions unsuccessful or patient reports new pain  - Anticipate increased pain with activity and pre-medicate as appropriate  Outcome: Progressing

## 2024-08-11 NOTE — DISCHARGE SUMMARY
[facility]  Discharge Summary     Shubham Zurita  : 1970    Status: Inpatient  Day #: 15    Attending: Matt Sierra MD  PCP: No primary care provider on file.     Date of Admission:  2024  Date of Discharge:  2024     Hospital Discharge Diagnoses:     Atrial fibrillation with RVR  Acute diastolic CHF  Severe hyperthyroidism  Graves disease  Anemia of chronic disease  Urinary retention, acute   DALTON  Deconditioning      History of Present Illness:     Copied from Admission H&P:    The patient is a 54-year-old [place] female who came into the emergency department for evaluation of progressive weight gain and dyspnea. She does not usually see a physician and does not take any medications at home. CBC was unremarkable, except for hemoglobin of 7.1 and MCV 73.7 suggestive of iron deficiency anemia. Liver function test and chemistry were roughly unremarkable. Troponin was negative. Magnesium 1.8. TSH was 0.008. Free T4 still pending. Noted to be in atrial fibrillation with rapid ventricular response, rate of 130. B natriuretic peptide 426, and chest x-ray showed cardiomegaly with small left pleural effusion. Patient was initiated on IV Cardizem, Lasix, heparin, and she will be admitted to the hospital for further management.       Hospital Course:     Atrial fibrillation with rapid ventricular response  Due to hyperthyroidism  - TSH low, started on methimazole  - cards on consult  - was ruba and hypotensive. Diltiazem and digoxin held. Now stable on flecanide, propanolol and midodrine  - heparin gtt --> oral anticoag Eliquis  - Echo shows ejection fraction is 60 to 65%  - stress test today normal     HFpEF  - Ejection fraction is 60 to 65%, cannot assess LV dysfunction due to heart rhythm   - was on bumex drip - IV diuresis held -> now on lasix PO  - Strict I's and O's and daily weights, repleted e-lytes per protocol  - cards on consult     Severe hyperthyroidism  Graves' disease    -  Admission FT4 high with suppressed TCH  - strong family history of autoimmune thyroid disease   - Was given PTU and dexamethasone in the ED  - Endocrinology consulted  - Swtiched Methimazole 20 mg twice daily --> Methimazole daily  - FT4 normalized  - Pt needs strict endo follow up OP in 2 weeks      Microcytic anemia  - Iron labs reveal ACD  - cont to monitor H/H, transfuse for Hgb <7  - pt never had a pap smear, colonscopy etc. Reports she is mostly a vegetarian due to own taste preference.   - Dietician consulted  - fecal occult negative  - hgb 6.9 8/4 required 1U pRBC transfusion with appropriate response     Urinary Retention  - unclear etiology  - pt endorsing good BMs  - on diuretic, durbin inserted 8/1, spontaneously fell out 8/4  - flomax given, dc on discharge  - urology consulted   - urinating ok now     DALTON   - Cr initially trended up to 1.15 from 0.6 on admission   - in setting of lasix vs retention vs other  - Feurea was intrinsic disease  - resolved     Deconditioning  - PT, OOB      Consultants         Provider   Role Specialty     Dwain Kellogg MD      Consulting Physician Cardiac Electrophysiology     Liberty Fuentes MD      Consulting Physician UROLOGY     Scott Black MD      Consulting Physician ENDOCRINOLOGY     Steve Mcfarlane MD      Consulting Physician Interventional, Cardiology             Physical Exam:   Blood pressure 104/59, pulse 62, temperature 98 °F (36.7 °C), temperature source Oral, resp. rate 18, height 5' 4\" (1.626 m), weight 187 lb 8 oz (85 kg), SpO2 100%.  General:  Alert, no distress  HEENT:  Normocephalic, atraumatic  Cardiac:  Regular rate, regular rhythm  Pulmonary:  Clear to auscultation bilaterally, respirations unlabored  Gastrointestinal:  Soft, non-tender, normal bowel sounds  Musculoskeletal:  No joint swelling  Extremities:  No edema, no cyanosis, no clubbing  Neurologic:  nonfocal  Psychiatric:  Normal affect, calm and appropriate         Discharge  Medications        START taking these medications        Instructions Prescription details   apixaban 5 MG Tabs  Commonly known as: Eliquis      Take 1 tablet (5 mg total) by mouth 2 (two) times daily.   Quantity: 60 tablet  Refills: 3     ferrous sulfate 325 (65 FE) MG Tbec      Take 1 tablet (325 mg total) by mouth 2 (two) times daily with meals.   Quantity: 60 tablet  Refills: 0     flecainide 100 MG Tabs  Commonly known as: Tambocor      Take 1 tablet (100 mg total) by mouth every 12 (twelve) hours.   Quantity: 60 tablet  Refills: 3     furosemide 40 MG Tabs  Commonly known as: Lasix      Take 1 tablet (40 mg total) by mouth daily.   Quantity: 30 tablet  Refills: 3     methIMAzole 10 MG Tabs  Commonly known as: Tapazole      Take 2 tablets (20 mg total) by mouth daily.   Quantity: 30 tablet  Refills: 1     midodrine 10 MG Tabs  Commonly known as: ProAmatine      Take 1 tablet (10 mg total) by mouth in the morning and 1 tablet (10 mg total) at noon and 1 tablet (10 mg total) in the evening.   Quantity: 90 tablet  Refills: 3     propranolol 10 MG Tabs  Commonly known as: Inderal      Take 3 tablets (30 mg total) by mouth 3 (three) times daily.   Quantity: 90 tablet  Refills: 3               Where to Get Your Medications        These medications were sent to [id number] - [place], [place] - [address] AT [facility], [phone], [phone]  [address], [facility] [address]      Hours: 24-hours Phone: [phone]   apixaban 5 MG Tabs  ferrous sulfate 325 (65 FE) MG Tbec  flecainide 100 MG Tabs  furosemide 40 MG Tabs  methIMAzole 10 MG Tabs  midodrine 10 MG Tabs  propranolol 10 MG Tabs        Follow-up Information       Esthela Dennison APRN Follow up in 3 day(s).    Specialties: Nurse Practitioner, CARDIOLOGY  Why: Office will call to schedule your follow up visit to see Celeste within 3-7 days  Contact information:  [address]  [address]  [address]  [phone]               Scott Black MD Follow up in 2 week(s).    Specialty: ENDOCRINOLOGY  Contact information:  [address]  [address]  [facility] [id number] [phone]                             Hospital Discharge Diagnoses:  afib with RVR    Lace+ Score: 58  59-90 High Risk  29-58 Medium Risk  0-28   Low Risk.    TCM Follow-Up Recommendation:  LACE 29-58: Moderate Risk of readmission after discharge from the hospital.        I spent >30 minutes on this discharge. Discussed treatment and discharge plans.       Matt Sierra MD

## 2024-08-11 NOTE — PROGRESS NOTES
Progress Note  Shubham Zurita Patient Status:  Inpatient    1970 MRN [id number]   Location [facility] 3W/SW Attending Sara Leiva MD   Hosp Day # 13 PCP No primary care provider on file.     SUBJECTIVE:    Denies complaints. Reports feeling much more improved since admission with increased energy, less weakness, and LE swelling reduced.     VITALS:  BP 90/51 (BP Location: Right arm)   Pulse 73   Temp 97.9 °F (36.6 °C) (Oral)   Resp 16   Ht 5' 4\" (1.626 m)   Wt 185 lb 4.8 oz (84.1 kg)   SpO2 99%   BMI 31.81 kg/m²     INTAKE/OUTPUT:    Intake/Output Summary (Last 24 hours) at 2024 0821  Last data filed at 2024 0439  Gross per 24 hour   Intake 480 ml   Output 1475 ml   Net -995 ml     Last 3 Weights   24 0439 185 lb 4.8 oz (84.1 kg)   08/10/24 0629 185 lb 3.2 oz (84 kg)   24 0412 184 lb 4.8 oz (83.6 kg)   24 0606 183 lb 3.2 oz (83.1 kg)   24 0449 181 lb 4.8 oz (82.2 kg)   24 0606 184 lb 9.6 oz (83.7 kg)   24 0104 203 lb 6.4 oz (92.3 kg)   24 0434 215 lb 4.8 oz (97.7 kg)   24 0445 232 lb 8 oz (105.5 kg)   24 0510 241 lb 4.8 oz (109.5 kg)   24 0415 248 lb 3.2 oz (112.6 kg)   24 1049 243 lb 9.6 oz (110.5 kg)   24 0300 248 lb 12.8 oz (112.9 kg)   24 0007 244 lb 1.6 oz (110.7 kg)   24 1835 242 lb 8.1 oz (110 kg)   24 1730 240 lb (108.9 kg)     LABS:  Recent Labs   Lab 24  0814 08/10/24  0731 24  0605   * 101* 79   BUN 26* 21 20   CREATSERUM 1.01 0.95 0.91   EGFRCR 66 71 75   CA 8.8 8.9 8.6*   * 132* 133*   K 3.7  3.7 3.8  3.8 4.2   CL 96* 97* 97*   CO2 32.0 31.0 33.0*     Recent Labs   Lab 24  0624  0814 08/10/24  0731 08/11/24  06   RBC 3.43* 3.61* 3.72* 3.72*   HGB 7.5* 7.8* 8.2* 8.1*   HCT 24.5* 26.0* 27.2* 27.0*   MCV 71.4* 72.0* 73.1* 72.6*   MCH 21.9* 21.6* 22.0* 21.8*   MCHC 30.6* 30.0* 30.1* 30.0*   RDW 18.3* 18.4* 18.6* 18.6*   NEPRELIM 2.65 3.07  2.88  --    WBC 4.9 5.2 5.1 4.2   .0 225.0 231.0 238.0     No results for input(s): \"TROP\", \"CK\" in the last 168 hours.    DIAGNOSTICS:    TELEMETRY: Converted to sinus this morning     ROS: Negative unless noted above     PHYSICAL EXAM:  General: Alert and oriented x 3. No apparent distress.  HEENT: Normocephalic, sclera are nonicteric. Hearing appropriate bilaterally.  Neck: No JVD. No Carotid bruits. Trachea midline.   Cardiac: RRegular rate and rhythm. S1, S2 auscultated.   Lungs: Clear without wheezes, rales, rhonchi or dullness. Chest expansion symmetrical. Regular effort.  Abdomen: Soft, non-tender, +BS. No hepatosplenomegaly or appreciable masses.   Extremities: Without clubbing, cyanosis. Peripheral pulses are 1+. BLE edema +1 from feet to mid shin,Non-pitting up to pelvic crest, compression wraps  Neurologic: Motor and sensory nerves grossly intact.   Psych: Appropriate affect   Skin: Warm and dry. No obvious lesions, wounds, or ulcerations.     MEDICATIONS:   furosemide  40 mg Oral Daily    propranolol  30 mg Oral TID    midodrine  5 mg Oral TID    digoxin  125 mcg Oral QOD    dilTIAZem ER  120 mg Oral Daily    apixaban  5 mg Oral BID    ferrous sulfate  325 mg Oral BID with meals    methIMAzole  20 mg Oral Daily    tamsulosin  0.4 mg Oral Daily @ 0700     ASSESSMENT:    Presented with one month of dyspnea, fatigue, and 60lb weight gain.     New Onset Afib, Unclear duration  - TSH abnormal   - s/p Cardizem gtt, rates were labile, evaluated by EP: Diltizem  mg daily, Propronolol 30 mg TID, Digoxin 0.125 mg every other day, Was persistent Afib but converted to sinus early this morning HR 60-90s  - Eliquis     Acute HFpEF/ Right Sided HF/ Wide open TR, Mod MR   Lymphedema  - , ECHO without RWMA, CXR small left pleural effusion, Albumin 3.2  - s/p Bumex gtt, Also on Aldactone, Net neg more than 26L, In comparison to standing wt on 8/1 she is down 64 lbs   - Diuretics were held with  worsening renal function, resumed yesterday Lasix 20 mg daily, renal function is stable  - Compression wraps  - Appears compensated    Hypotension- Stable on Midodrine   Thyrotoxicosis w/ Crisis - Evaluated by endocrinology following, Methimazole   Acute Anemia - Hgb 6.9 yesterday, s/p 1unit PRBCs, now 8.1- Occult stool neg    Decreased Mobility - PT  Electrolyte Imbalance-  Cardiac Replacement   Urinary Retention - Urology evaluated, PO Tamulosin    PLAN:  - Appears stable on Lasix 50 mg daily. Has converted to sinus rhythm and rates are 60s-90s. Appears stable for discharge from a cardiology standpoint. Our office will call her to schedule a follw-up visit with [facility] SILVINO Alonso within 7 days. BMP prior to visit.      ADDENDUM 1407: Patient had a 3.3 second sinus pause of telemetry with concurrent hypotension (SBP 70s) and lightheadedness. Did not receive Digoxin this morning. Hold Lasix, Digoxin, and Cardizem. Will have EP re-evaluate tomorrow. Hold discharge. Check EKG. Increase Midodrine.     Plan of care discussed with patient and RN.     Chery Garsia, APRN  8/11/2024  8:21 AM  [phone] ([place])  [phone] ([place])

## 2024-08-11 NOTE — PROGRESS NOTES
[facility]  Progress Note     Shubhma Zurita  : 1970    Status: Inpatient  Day #: 13    Attending: Matt Sierra MD  PCP: No primary care provider on file.     Assessment and Plan:    Atrial fibrillation with rapid ventricular response  Due to hyperthyroidism  - TSH low, started on methimazole  - cards on consult  - off dilt gtt  - heparin gtt --> oral anticoag Eliquis  - Echo shows ejection fraction is 60 to 65%  - discharge today cancelled due to 3.3 second pause with hypotension and lightheadedness.EP go eval tomorrow.  - digoxin and diltiazem on hold     HFpEF  - Ejection fraction is 60 to 65%, cannot assess LV dysfunction due to heart rhythm   - was on bumex drip - IV diuresis held -> now on lasix PO but held  - Strict I's and O's and daily weights, replete e-lytes per protocol  - cards on consult     Severe hyperthyroidism  Graves' disease    - Admission FT4 high with suppressed TCH  - strong family history of autoimmune thyroid disease   - Was given PTU and dexamethasone in the ED  - Endocrinology consulted  - Swtiched Methimazole 20 mg twice daily --> Methimazole daily  - FT4 normalized  - Pt needs strict endo follow up OP in 2 weeks      Microcytic anemia  - Iron labs reveal ACD  - cont to monitor H/H, transfuse for Hgb <7  - pt never had a pap smear, colonscopy etc. Reports she is mostly a vegetarian due to own taste preference.   - Dietician consulted  - fecal occult negative  - hgb 6.9  required 1U pRBC transfusion with appropriate response     Urinary Retention  - unclear etiology  - pt endorsing good BMs  - on diuretic, durbin inserted , spontaneously fell out   - flomax for now, dc on discharge  - urology consulted      DALTON   - Cr initially trended up to 1.15 from 0.6 on admission   - in setting of lasix vs retention vs other  - Feurea was intrinsic disease  - monitor diuretic carefully, strict I/os     Deconditioning  - PT, OOB     Dietitian Malnutrition  Assessment        Evaluation for Malnutrition: Criteria for non-severe malnutrition diagnosis-         chronic illness related to   Energy intake less than 75% for greater than 1 month., Body fat mild depletion., Muscle mass mild depletion.       RD Malnutrition Care Plan: Encouraged small frequent meals with emphasis on high calorie/high protein., Monitor need for ONS (oral nutritional supplements).    Body Fat/Muscle Mass: Mild depletion body fat., Mild depletion muscle mass. triceps region. temple region., clavicle region.    Physician Assessment    Patient has a diagnosis of moderate malnutrition       DVT Mechanical Prophylaxis:        DVT Pharmacologic Prophylaxis   Medication    apixaban (Eliquis) tab 5 mg               Subjective:      Initial Chief Complaint:  wt gain and SOB    Became dizzy when shehad low BP and pause on tele.     10 point ROS completed and was negative, except for pertinent positive and negatives stated in subjective.      Objective:      Temp:  [97.9 °F (36.6 °C)-98.6 °F (37 °C)] 97.9 °F (36.6 °C)  Pulse:  [42-77] 48  Resp:  [16-18] 18  BP: ()/(49-68) 90/59  SpO2:  [98 %-100 %] 99 %  General:  Alert, no distress  HEENT:  Normocephalic, atraumatic  Cardiac:  Regular rate, regular rhythm  Pulmonary:  Clear to auscultation bilaterally, respirations unlabored  Gastrointestinal:  Soft, non-tender, normal bowel sounds  Musculoskeletal:  No joint swelling  Extremities:  b/l LE edema  Neurologic:  nonfocal  Psychiatric:  Normal affect, calm and appropriate    Intake/Output Summary (Last 24 hours) at 8/11/2024 1635  Last data filed at 8/11/2024 1500  Gross per 24 hour   Intake 720 ml   Output 1775 ml   Net -1055 ml         Recent Labs   Lab 08/08/24  0600 08/09/24  0814 08/10/24  0731 08/11/24  0605   WBC 4.9 5.2 5.1 4.2   HGB 7.5* 7.8* 8.2* 8.1*   HCT 24.5* 26.0* 27.2* 27.0*   .0 225.0 231.0 238.0   RBC 3.43* 3.61* 3.72* 3.72*   MCV 71.4* 72.0* 73.1* 72.6*   MCH 21.9* 21.6* 22.0*  21.8*   MCHC 30.6* 30.0* 30.1* 30.0*   RDW 18.3* 18.4* 18.6* 18.6*   NEPRELIM 2.65 3.07 2.88  --      Recent Labs   Lab 08/05/24  0639 08/06/24  0644 08/08/24  0600 08/09/24  0814 08/10/24  0731 08/11/24  0605   BUN 23   < > 26* 26* 21 20   CREATSERUM 0.98   < > 1.05* 1.01 0.95 0.91   CA 9.1   < > 8.7 8.8 8.9 8.6*   *   < > 133* 131* 132* 133*   K 4.0   < > 3.8 3.7  3.7 3.8  3.8 4.2   CL 95*   < > 94* 96* 97* 97*   CO2 35.0*   < > 35.0* 32.0 31.0 33.0*   GLU 88   < > 90 141* 101* 79   MG 1.4*   < > 1.8 1.8 2.0  --    .8*  --   --   --   --   --     < > = values in this interval not displayed.       No results found.  EKG 12 Lead    Result Date: 8/11/2024  Sinus bradycardia Possible Right ventricular hypertrophy Abnormal ECG When compared with ECG of 29-JUL-2024 17:37, Sinus rhythm has replaced Atrial fibrillation Vent. rate has decreased BY  89 BPM Nonspecific T wave abnormality no longer evident in Inferior leads Nonspecific T wave abnormality, improved in Anterior-lateral leads   Medications:   midodrine  10 mg Oral TID    furosemide  40 mg Oral Daily    propranolol  30 mg Oral TID    [Held by provider] digoxin  125 mcg Oral QOD    [Held by provider] dilTIAZem ER  120 mg Oral Daily    apixaban  5 mg Oral BID    ferrous sulfate  325 mg Oral BID with meals    methIMAzole  20 mg Oral Daily    tamsulosin  0.4 mg Oral Daily @ 0700      PRN Meds:   polyethylene glycol (PEG 3350)    dilTIAZem    acetaminophen    ondansetron    prochlorperazine    temazepam    Supplementary Documentation:        MDM High. Time spent on chart/note review, review of labs/imaging, discussion with patient, physical exam, discussion with staff, consultants, coordinating care, writing progress note, discussion of plan of care.      Matt Sierra MD

## 2024-08-12 LAB
ANION GAP SERPL CALC-SCNC: 5 MMOL/L (ref 0–18)
ATRIAL RATE: 50 BPM
BUN BLD-MCNC: 25 MG/DL (ref 9–23)
BUN/CREAT SERPL: 26 (ref 10–20)
CALCIUM BLD-MCNC: 8.6 MG/DL (ref 8.7–10.4)
CHLORIDE SERPL-SCNC: 98 MMOL/L (ref 98–112)
CO2 SERPL-SCNC: 30 MMOL/L (ref 21–32)
CREAT BLD-MCNC: 0.96 MG/DL
DEPRECATED RDW RBC AUTO: 47.9 FL (ref 35.1–46.3)
EGFRCR SERPLBLD CKD-EPI 2021: 70 ML/MIN/1.73M2 (ref 60–?)
ERYTHROCYTE [DISTWIDTH] IN BLOOD BY AUTOMATED COUNT: 18.7 % (ref 11–15)
GLUCOSE BLD-MCNC: 83 MG/DL (ref 70–99)
HCT VFR BLD AUTO: 25.5 %
HGB BLD-MCNC: 8 G/DL
MAGNESIUM SERPL-MCNC: 2.1 MG/DL (ref 1.6–2.6)
MCH RBC QN AUTO: 22.4 PG (ref 26–34)
MCHC RBC AUTO-ENTMCNC: 31.4 G/DL (ref 31–37)
MCV RBC AUTO: 71.4 FL
OSMOLALITY SERPL CALC.SUM OF ELEC: 280 MOSM/KG (ref 275–295)
P AXIS: 85 DEGREES
P-R INTERVAL: 204 MS
PLATELET # BLD AUTO: 285 10(3)UL (ref 150–450)
POTASSIUM SERPL-SCNC: 4.4 MMOL/L (ref 3.5–5.1)
Q-T INTERVAL: 478 MS
QRS DURATION: 76 MS
QTC CALCULATION (BEZET): 435 MS
R AXIS: 102 DEGREES
RBC # BLD AUTO: 3.57 X10(6)UL
SODIUM SERPL-SCNC: 133 MMOL/L (ref 136–145)
T AXIS: 72 DEGREES
VENTRICULAR RATE: 50 BPM
WBC # BLD AUTO: 5 X10(3) UL (ref 4–11)

## 2024-08-12 PROCEDURE — 99233 SBSQ HOSP IP/OBS HIGH 50: CPT | Performed by: HOSPITALIST

## 2024-08-12 NOTE — PLAN OF CARE
Problem: CARDIOVASCULAR - ADULT  Goal: Maintains optimal cardiac output and hemodynamic stability  Description: INTERVENTIONS:  - Monitor vital signs, rhythm, and trends  - Monitor for bleeding, hypotension and signs of decreased cardiac output  - Evaluate effectiveness of vasoactive medications to optimize hemodynamic stability  - Monitor arterial and/or venous puncture sites for bleeding and/or hematoma  - Assess quality of pulses, skin color and temperature  - Assess for signs of decreased coronary artery perfusion - ex. Angina  - Evaluate fluid balance, assess for edema, trend weights  Outcome: Progressing     Problem: SAFETY ADULT - FALL  Goal: Free from fall injury  Description: INTERVENTIONS:  - Assess pt frequently for physical needs  - Identify cognitive and physical deficits and behaviors that affect risk of falls.  - [place] fall precautions as indicated by assessment.  - Educate pt/family on patient safety including physical limitations  - Instruct pt to call for assistance with activity based on assessment  - Modify environment to reduce risk of injury  - Provide assistive devices as appropriate  - Consider OT/PT consult to assist with strengthening/mobility  - Encourage toileting schedule  Outcome: Progressing     Problem: DISCHARGE PLANNING  Goal: Discharge to home or other facility with appropriate resources  Description: INTERVENTIONS:  - Identify barriers to discharge w/pt and caregiver  - Include patient/family/discharge partner in discharge planning  - Arrange for needed discharge resources and transportation as appropriate  - Identify discharge learning needs (meds, wound care, etc)  - Arrange for interpreters to assist at discharge as needed  - Consider post-discharge preferences of patient/family/discharge partner  - Complete POLST form as appropriate  - Assess patient's ability to be responsible for managing their own health  - Refer to Case Management Department for coordinating  discharge planning if the patient needs post-hospital services based on physician/LIP order or complex needs related to functional status, cognitive ability or social support system  Outcome: Progressing     Problem: PAIN - ADULT  Goal: Verbalizes/displays adequate comfort level or patient's stated pain goal  Description: INTERVENTIONS:  - Encourage pt to monitor pain and request assistance  - Assess pain using appropriate pain scale  - Administer analgesics based on type and severity of pain and evaluate response  - Implement non-pharmacological measures as appropriate and evaluate response  - Consider cultural and social influences on pain and pain management  - Manage/alleviate anxiety  - Utilize distraction and/or relaxation techniques  - Monitor for opioid side effects  - Notify MD/LIP if interventions unsuccessful or patient reports new pain  - Anticipate increased pain with activity and pre-medicate as appropriate  Outcome: Progressing     DC canceled d/t 3.3 second pause  SBP in the 70s - resolved after patient placed in Trendelenburg position  Additional Midodrine given per [facility]   Patient states she is feeling much better this evening

## 2024-08-12 NOTE — PLAN OF CARE
Pt is alert, RA, bradycardic on tele, propranolol not given today, plan for stress test tomorrow AM.   Problem: CARDIOVASCULAR - ADULT  Goal: Maintains optimal cardiac output and hemodynamic stability  Description: INTERVENTIONS:  - Monitor vital signs, rhythm, and trends  - Monitor for bleeding, hypotension and signs of decreased cardiac output  - Evaluate effectiveness of vasoactive medications to optimize hemodynamic stability  - Monitor arterial and/or venous puncture sites for bleeding and/or hematoma  - Assess quality of pulses, skin color and temperature  - Assess for signs of decreased coronary artery perfusion - ex. Angina  - Evaluate fluid balance, assess for edema, trend weights  Outcome: Progressing  Goal: Absence of cardiac arrhythmias or at baseline  Description: INTERVENTIONS:  - Continuous cardiac monitoring, monitor vital signs, obtain 12 lead EKG if indicated  - Evaluate effectiveness of antiarrhythmic and heart rate control medications as ordered  - Initiate emergency measures for life threatening arrhythmias  - Monitor electrolytes and administer replacement therapy as ordered  Outcome: Progressing     Problem: PAIN - ADULT  Goal: Verbalizes/displays adequate comfort level or patient's stated pain goal  Description: INTERVENTIONS:  - Encourage pt to monitor pain and request assistance  - Assess pain using appropriate pain scale  - Administer analgesics based on type and severity of pain and evaluate response  - Implement non-pharmacological measures as appropriate and evaluate response  - Consider cultural and social influences on pain and pain management  - Manage/alleviate anxiety  - Utilize distraction and/or relaxation techniques  - Monitor for opioid side effects  - Notify MD/LIP if interventions unsuccessful or patient reports new pain  - Anticipate increased pain with activity and pre-medicate as appropriate  Outcome: Progressing     Problem: SAFETY ADULT - FALL  Goal: Free from fall  injury  Description: INTERVENTIONS:  - Assess pt frequently for physical needs  - Identify cognitive and physical deficits and behaviors that affect risk of falls.  - [place] fall precautions as indicated by assessment.  - Educate pt/family on patient safety including physical limitations  - Instruct pt to call for assistance with activity based on assessment  - Modify environment to reduce risk of injury  - Provide assistive devices as appropriate  - Consider OT/PT consult to assist with strengthening/mobility  - Encourage toileting schedule  Outcome: Progressing     Problem: DISCHARGE PLANNING  Goal: Discharge to home or other facility with appropriate resources  Description: INTERVENTIONS:  - Identify barriers to discharge w/pt and caregiver  - Include patient/family/discharge partner in discharge planning  - Arrange for needed discharge resources and transportation as appropriate  - Identify discharge learning needs (meds, wound care, etc)  - Arrange for interpreters to assist at discharge as needed  - Consider post-discharge preferences of patient/family/discharge partner  - Complete POLST form as appropriate  - Assess patient's ability to be responsible for managing their own health  - Refer to Case Management Department for coordinating discharge planning if the patient needs post-hospital services based on physician/LIP order or complex needs related to functional status, cognitive ability or social support system  Outcome: Progressing

## 2024-08-12 NOTE — PROGRESS NOTES
Progress Note  Shubham Zurita Patient Status:  Inpatient    1970 MRN [id number]   Location [facility] 3W/SW Attending Sara Leiva MD   Hosp Day # 14 PCP No primary care provider on file.       CARDIOLOGIST ADDENDUM:    I agree with the findings below.  I have personally performed the Assessment and Plan in its entirety, as detailed below:    AF, paroxysmal  Sinus Bradycardia   - Continue propranolol 30 TID for rate control  - Stay off digoxin and diltiazem  - [place] [employer] stress test tomorrow  - Start flecainide 100 mg PO BID tomorrow, if stress test normal     Acute HFpEF/ Right Sided HF/ Wide open TR, Mod MR   - Continue Lasix 40 once daily  - Monitor weights at home     Hypotension  - Continue midodrine 10 mg TID     Thyrotoxicosis  - On Methimazole     Anemia  - F/u with PCP +/- Heme Svc as outpt    Likely home tomorrow.    Dwain Kellogg M.D.   Cardiology/Electrophysiology   2024  L3  -----------------------------------------    SUBJECTIVE:    Denies complaints. Reports trouble sleeping overnight due to noise form the hospital, sleepy this morning. LE swelling reduced.     VITALS:  /59 (BP Location: Right arm)   Pulse 55   Temp 98 °F (36.7 °C) (Oral)   Resp 16   Ht 5' 4\" (1.626 m)   Wt 187 lb 6.4 oz (85 kg)   SpO2 98%   BMI 32.17 kg/m²     INTAKE/OUTPUT:    Intake/Output Summary (Last 24 hours) at 2024 0900  Last data filed at 2024 0500  Gross per 24 hour   Intake 240 ml   Output 1000 ml   Net -760 ml     Last 3 Weights   24 0555 187 lb 6.4 oz (85 kg)   24 0439 185 lb 4.8 oz (84.1 kg)   08/10/24 0629 185 lb 3.2 oz (84 kg)   24 0412 184 lb 4.8 oz (83.6 kg)   24 0606 183 lb 3.2 oz (83.1 kg)   24 0449 181 lb 4.8 oz (82.2 kg)   24 0606 184 lb 9.6 oz (83.7 kg)   24 0104 203 lb 6.4 oz (92.3 kg)   24 0434 215 lb 4.8 oz (97.7 kg)   24 0445 232 lb 8 oz (105.5 kg)   24 0510 241 lb 4.8 oz (109.5 kg)   24 0415  248 lb 3.2 oz (112.6 kg)   07/31/24 1049 243 lb 9.6 oz (110.5 kg)   07/31/24 0300 248 lb 12.8 oz (112.9 kg)   07/30/24 0007 244 lb 1.6 oz (110.7 kg)   07/29/24 1835 242 lb 8.1 oz (110 kg)   07/29/24 1730 240 lb (108.9 kg)     LABS:  Recent Labs   Lab 08/10/24  0731 08/11/24  0605 08/12/24  0555   * 79 83   BUN 21 20 25*   CREATSERUM 0.95 0.91 0.96   EGFRCR 71 75 70   CA 8.9 8.6* 8.6*   * 133* 133*   K 3.8  3.8 4.2 4.4   CL 97* 97* 98   CO2 31.0 33.0* 30.0     Recent Labs   Lab 08/08/24  0600 08/09/24  0814 08/10/24  0731 08/11/24  0605 08/12/24  0555   RBC 3.43* 3.61* 3.72* 3.72* 3.57*   HGB 7.5* 7.8* 8.2* 8.1* 8.0*   HCT 24.5* 26.0* 27.2* 27.0* 25.5*   MCV 71.4* 72.0* 73.1* 72.6* 71.4*   MCH 21.9* 21.6* 22.0* 21.8* 22.4*   MCHC 30.6* 30.0* 30.1* 30.0* 31.4   RDW 18.3* 18.4* 18.6* 18.6* 18.7*   NEPRELIM 2.65 3.07 2.88  --   --    WBC 4.9 5.2 5.1 4.2 5.0   .0 225.0 231.0 238.0 285.0     No results for input(s): \"TROP\", \"CK\" in the last 168 hours.    DIAGNOSTICS:    TELEMETRY: Converted to sinus this morning     ROS: Negative unless noted above     PHYSICAL EXAM:  General: Alert and oriented x 3. No apparent distress.  HEENT: Normocephalic, sclera are nonicteric. Hearing appropriate bilaterally.  Neck: No JVD. No Carotid bruits. Trachea midline.   Cardiac: Regular rate and rhythm. S1, S2 auscultated.   Lungs: Clear without wheezes, rales, rhonchi or dullness. Chest expansion symmetrical. Regular effort.  Abdomen: Soft, non-tender, +BS. No hepatosplenomegaly or appreciable masses.   Extremities: Without clubbing, cyanosis. Peripheral pulses are 1+. BLE edema +1 from feet to mid shin,Non-pitting up to pelvic crest, compression wraps  Neurologic: Motor and sensory nerves grossly intact.   Psych: Appropriate affect   Skin: Warm and dry. No obvious lesions, wounds, or ulcerations.     MEDICATIONS:   midodrine  10 mg Oral TID    furosemide  40 mg Oral Daily    propranolol  30 mg Oral TID    [Held by  provider] digoxin  125 mcg Oral QOD    [Held by provider] dilTIAZem ER  120 mg Oral Daily    apixaban  5 mg Oral BID    ferrous sulfate  325 mg Oral BID with meals    methIMAzole  20 mg Oral Daily    tamsulosin  0.4 mg Oral Daily @ 0700     ASSESSMENT:    Presented with one month of dyspnea, fatigue, and 60lb weight gain.     New Onset Afib, Unclear duration  Sinus Bradycardia   - TSH abnormal   - s/p Cardizem gtt, rates were labile, evaluated by EP: Diltizem  mg daily, Propronolol 30 mg TID, Digoxin 0.125 mg every other day, Was persistent Afib but converted to sinus early 8/11, in the afternoon developed symptomatic bradycardia and hypotension; it appears that she became bradycardic first and then hypotensive. Currently sinus in the 50s, overnight HRs mainly 40s, Lowest 32 with rare PACs  - Eliquis     Acute HFpEF/ Right Sided HF/ Wide open TR, Mod MR   Lymphedema  - , ECHO without RWMA, CXR small left pleural effusion, Albumin 3.2  - s/p Bumex gtt, Also on Aldactone, Net neg more than 26L, Was down around 60 lbs, wt now slightly trending up   - Lasix 40 mg daily, renal function is stable  - Compression wraps  - Appears compensated    Hypotension  - Midodrine increased to 10 mg TID, Bps are stable   - SBP early this morning, per patient report did not have symptoms, s/p 500 mL bolus     Thyrotoxicosis w/ Crisis - Evaluated by endocrinology following, Methimazole   Acute Anemia - Hgb 6.9 yesterday, s/p 1unit PRBCs, now 8- Occult stool neg    Decreased Mobility - PT  Electrolyte Imbalance-  Cardiac Replacement   Urinary Retention - Urology evaluated, PO Tamulosin    PLAN:  - Check mag  - Hold propranolol this morning, will have EP evaluate today   - Outpatient sleep study with [facility]     Plan of care discussed with patient and RN.     Chery Garsia, APRN  8/12/2024  9:07 AM  [phone] ([place])  [phone] ([place])

## 2024-08-12 NOTE — PROGRESS NOTES
[facility]  Progress Note     Shubham Zurita  : 1970    Status: Inpatient  Day #: 14    Attending: Matt Sierra MD  PCP: No primary care provider on file.     Assessment and Plan:    Atrial fibrillation with rapid ventricular response  Due to hyperthyroidism  - TSH low, started on methimazole  - cards on consult  - off dilt gtt  - heparin gtt --> oral anticoag Eliquis  - Echo shows ejection fraction is 60 to 65%  - discharge  cancelled due to 3.3 second pause with hypotension and lightheadedness. Bradycardic today.  - digoxin and diltiazem and inderal on hold  - EP to re-eval     HFpEF  - Ejection fraction is 60 to 65%, cannot assess LV dysfunction due to heart rhythm   - was on bumex drip - IV diuresis held -> now on lasix PO  - Strict I's and O's and daily weights, replete e-lytes per protocol  - cards on consult     Severe hyperthyroidism  Graves' disease    - Admission FT4 high with suppressed TCH  - strong family history of autoimmune thyroid disease   - Was given PTU and dexamethasone in the ED  - Endocrinology consulted  - Swtiched Methimazole 20 mg twice daily --> Methimazole daily  - FT4 normalized  - Pt needs strict endo follow up OP in 2 weeks      Microcytic anemia  - Iron labs reveal ACD  - cont to monitor H/H, transfuse for Hgb <7  - pt never had a pap smear, colonscopy etc. Reports she is mostly a vegetarian due to own taste preference.   - Dietician consulted  - fecal occult negative  - hgb 6.9  required 1U pRBC transfusion with appropriate response     Urinary Retention  - unclear etiology  - pt endorsing good BMs  - on diuretic, udrbin inserted , spontaneously fell out   - flomax for now, dc on discharge  - urology consulted      DALTON   - Cr initially trended up to 1.15 from 0.6 on admission   - in setting of lasix vs retention vs other  - Feurea was intrinsic disease  - monitor diuretic carefully, strict I/os     Deconditioning  - PT, OOB     Dietitian Malnutrition  Assessment        Evaluation for Malnutrition: Criteria for non-severe malnutrition diagnosis-         chronic illness related to   Energy intake less than 75% for greater than 1 month., Body fat mild depletion., Muscle mass mild depletion.       RD Malnutrition Care Plan: Encouraged small frequent meals with emphasis on high calorie/high protein., Monitor need for ONS (oral nutritional supplements).    Body Fat/Muscle Mass: Mild depletion body fat., Mild depletion muscle mass. triceps region. temple region., clavicle region.    Physician Assessment    Patient has a diagnosis of moderate malnutrition       DVT Mechanical Prophylaxis:        DVT Pharmacologic Prophylaxis   Medication    apixaban (Eliquis) tab 5 mg               Subjective:      Initial Chief Complaint:  wt gain and SOB    Became dizzy when shehad low BP and pause on tele.     10 point ROS completed and was negative, except for pertinent positive and negatives stated in subjective.      Objective:      Temp:  [98 °F (36.7 °C)-98.5 °F (36.9 °C)] 98 °F (36.7 °C)  Pulse:  [46-57] 55  Resp:  [16-18] 16  BP: ()/(49-62) 100/59  SpO2:  [98 %-100 %] 98 %  General:  Alert, no distress  HEENT:  Normocephalic, atraumatic  Cardiac:  Regular rate, regular rhythm  Pulmonary:  Clear to auscultation bilaterally, respirations unlabored  Gastrointestinal:  Soft, non-tender, normal bowel sounds  Musculoskeletal:  No joint swelling  Extremities:  b/l LE edema  Neurologic:  nonfocal  Psychiatric:  Normal affect, calm and appropriate    Intake/Output Summary (Last 24 hours) at 8/12/2024 1238  Last data filed at 8/12/2024 0900  Gross per 24 hour   Intake 480 ml   Output 1000 ml   Net -520 ml         Recent Labs   Lab 08/08/24  0600 08/09/24  0814 08/10/24  0731 08/11/24  0605 08/12/24  0555   WBC 4.9 5.2 5.1 4.2 5.0   HGB 7.5* 7.8* 8.2* 8.1* 8.0*   HCT 24.5* 26.0* 27.2* 27.0* 25.5*   .0 225.0 231.0 238.0 285.0   RBC 3.43* 3.61* 3.72* 3.72* 3.57*   MCV 71.4*  72.0* 73.1* 72.6* 71.4*   MCH 21.9* 21.6* 22.0* 21.8* 22.4*   MCHC 30.6* 30.0* 30.1* 30.0* 31.4   RDW 18.3* 18.4* 18.6* 18.6* 18.7*   NEPRELIM 2.65 3.07 2.88  --   --      Recent Labs   Lab 08/09/24  0814 08/10/24  0731 08/11/24  0605 08/12/24  0555   BUN 26* 21 20 25*   CREATSERUM 1.01 0.95 0.91 0.96   CA 8.8 8.9 8.6* 8.6*   * 132* 133* 133*   K 3.7  3.7 3.8  3.8 4.2 4.4   CL 96* 97* 97* 98   CO2 32.0 31.0 33.0* 30.0   * 101* 79 83   MG 1.8 2.0  --  2.1       No results found.  EKG 12 Lead    Result Date: 8/11/2024  Sinus bradycardia Possible Right ventricular hypertrophy Abnormal ECG When compared with ECG of 29-JUL-2024 17:37, Sinus rhythm has replaced Atrial fibrillation Vent. rate has decreased BY  89 BPM Nonspecific T wave abnormality no longer evident in Inferior leads Nonspecific T wave abnormality, improved in Anterior-lateral leads   Medications:   midodrine  10 mg Oral TID    furosemide  40 mg Oral Daily    propranolol  30 mg Oral TID    [Held by provider] digoxin  125 mcg Oral QOD    [Held by provider] dilTIAZem ER  120 mg Oral Daily    apixaban  5 mg Oral BID    ferrous sulfate  325 mg Oral BID with meals    methIMAzole  20 mg Oral Daily    tamsulosin  0.4 mg Oral Daily @ 0700      PRN Meds:   polyethylene glycol (PEG 3350)    dilTIAZem    acetaminophen    ondansetron    prochlorperazine    temazepam    Supplementary Documentation:        MDM High. Time spent on chart/note review, review of labs/imaging, discussion with patient, physical exam, discussion with staff, consultants, coordinating care, writing progress note, discussion of plan of care.      Matt Sierra MD

## 2024-08-12 NOTE — PAYOR COMM NOTE
--------------  8/10- 11 CONTINUED STAY REVIEW    Payor: [facility]  Subscriber #:  [id number]  Authorization Number: [id number]    8/10:     CARDS:    In bed on exam and denies chest pain or dyspnea.  Ongoing le edema but now wrapped.     /54 (BP Location: Right arm)   Pulse 77   Temp 98.5 °F (36.9 °C) (Oral)   Resp 18   Ht 5' 4\" (1.626 m)   Wt 185 lb 3.2 oz (84 kg)   SpO2 97%   BMI 31.79 kg/m²      Telemetry: afib w CVR 60-80s          Last 3 Weights   08/10/24 0629 185 lb 3.2 oz (84 kg)   08/09/24 0412 184 lb 4.8 oz (83.6 kg)   08/08/24 0606 183 lb 3.2 oz (83.1 kg)   08/07/24 0449 181 lb 4.8 oz (82.2 kg)   08/06/24 0606 184 lb 9.6 oz (83.7 kg)   08/05/24 0104 203 lb 6.4 oz (92.3 kg)   08/04/24 0434 215 lb 4.8 oz (97.7 kg)       Lab 08/08/24  0600 08/09/24  0814 08/10/24  0731   RBC 3.43* 3.61* 3.72*   HGB 7.5* 7.8* 8.2*   HCT 24.5* 26.0* 27.2*   MCV 71.4* 72.0* 73.1*   MCH 21.9* 21.6* 22.0*   MCHC 30.6* 30.0* 30.1*   RDW 18.3* 18.4* 18.6*   NEPRELIM 2.65 3.07 2.88   WBC 4.9 5.2 5.1   .0 225.0 231.0       Review of Systems   Cardiovascular:  Positive for dyspnea on exertion, irregular heartbeat and leg swelling.   Respiratory: Negative.         Physical Exam:  Gen: alert, oriented x 3, NAD  Heent: pupils equal, reactive. Mucous membranes moist.   Neck: no jvd  Cardiac: irregularly irregular, normal S1,S2, murmur at left sternal border  Lungs: dim, on RA  Abd: soft, NT/ND +bs  Ext: 2+ ble edema with wrapping  Skin: Warm, dry  Neuro: No focal deficits     Scheduled Medications    furosemide  40 mg Oral Daily    propranolol  30 mg Oral TID    midodrine  5 mg Oral TID    digoxin  125 mcg Oral QOD    dilTIAZem ER  120 mg Oral Daily    apixaban  5 mg Oral BID    ferrous sulfate  325 mg Oral BID with meals    methIMAzole  20 mg Oral Daily    tamsulosin  0.4 mg Oral Daily @ 0700       Assessment:  Afib with RVR, new onset, exacerbated by below. Rates currently controlled.    Propranolol, ccb, dig  Eliquis.   Thyrotoxicosis  Endocrine following  Acute HFpEF, severe valve disease with wide open TR and mod MR    Echo 7/30/24-LVEF 60-65%, no rwmas. RV function normal, mod MR, wide open TR.   Sig vol OL at time of echo. Now down ~60#. Will need repeat echo to re-evaluate valve disease as an outpatient  IV Diuretics held with worsening DALTON but now on oral diuretics  Hypotension-stable  On Midodrine.  Chronic LE edema with lymphedema   Has compression wrappings  Urinary retention  Urology following.   Acute anemia, stable today.  Hgb as low as 6.9. s/p prbcs.      Plan:  Cont current rate control regimen. Rates currently controlled. Evaluated by Dr. Kellogg on 8/7.  Cont uninterrupted eliquis.   -27 Liters down since admit. Creat normalized. Started oral diuretic. Limited on GDMT due to hypotension on midodrine.    HOSPITALIST:    Assessment and Plan:     Atrial fibrillation with rapid ventricular response  Due to hyperthyroidism  - TSH low, started on methimazole  - cards on consult  - off dilt gtt  - cont diltiazem po, digoxin, inderal  - heparin gtt --> oral anticoag Eliquis  - Echo shows ejection fraction is 60 to 65%     HFpEF  - Ejection fraction is 60 to 65%, cannot assess LV dysfunction due to heart rhythm   - was on bumex drip - IV diuresis held -> now on lasix PO  - spironolactone 12.5mg every day held  - Strict I's and O's and daily weights, replete e-lytes per protocol  - cards on consult     Severe hyperthyroidism  Graves' disease    - Admission FT4 high with suppressed TCH  - strong family history of autoimmune thyroid disease   - Was given PTU and dexamethasone in the ED  - Endocrinology consulted  - Swtiched Methimazole 20 mg twice daily --> Methimazole daily  - FT4 normalized  - Pt needs strict endo follow up OP in 2 weeks      Microcytic anemia  - Iron labs reveal ACD  - cont to monitor H/H, transfuse for Hgb <7  - pt never had a pap smear, colonscopy etc. Reports she  is mostly a vegetarian due to own taste preference.   - Dietician consulted  - fecal occult negative  - hgb 6.9 8/4 required 1U pRBC transfusion with appropriate response     Urinary Retention  - unclear etiology  - pt endorsing good BMs  - on diuretic, durbin inserted 8/1, spontaneously fell out 8/4  - flomax for now, dc on discharge  - urology consulted      DALTON   - Cr initially trended up to 1.15 from 0.6 on admission   - in setting of lasix vs retention vs other  - Feurea was intrinsic disease  - monitor diuretic carefully, strict I/os     Deconditioning  - PT, OOB       8/11:     CARDS:  Denies complaints. Reports feeling much more improved since admission with increased energy, less weakness, and LE swelling reduced.     BP 90/51 (BP Location: Right arm)   Pulse 73   Temp 97.9 °F (36.6 °C) (Oral)   Resp 16   Ht 5' 4\" (1.626 m)   Wt 185 lb 4.8 oz (84.1 kg)   SpO2 99%   BMI 31.81 kg/m²      08/11/24 0439 185 lb 4.8 oz (84.1 kg)   08/10/24 0629 185 lb 3.2 oz (84 kg)   08/09/24 0412 184 lb 4.8 oz (83.6 kg)     Recent Labs   Lab 08/09/24  0814 08/10/24  0731 08/11/24  0605   * 101* 79   BUN 26* 21 20   CREATSERUM 1.01 0.95 0.91   EGFRCR 66 71 75   CA 8.8 8.9 8.6*   * 132* 133*   K 3.7  3.7 3.8  3.8 4.2   CL 96* 97* 97*   CO2 32.0 31.0 33.0*      Lab 08/08/24  0600 08/09/24  0814 08/10/24  0731 08/11/24  0605   RBC 3.43* 3.61* 3.72* 3.72*   HGB 7.5* 7.8* 8.2* 8.1*   HCT 24.5* 26.0* 27.2* 27.0*   MCV 71.4* 72.0* 73.1* 72.6*   MCH 21.9* 21.6* 22.0* 21.8*   MCHC 30.6* 30.0* 30.1* 30.0*   RDW 18.3* 18.4* 18.6* 18.6*   NEPRELIM 2.65 3.07 2.88  --    WBC 4.9 5.2 5.1 4.2   .0 225.0 231.0 238.0       TELEMETRY: Converted to sinus this morning       PHYSICAL EXAM:  General: Alert and oriented x 3. No apparent distress.  HEENT: Normocephalic, sclera are nonicteric. Hearing appropriate bilaterally.  Neck: No JVD. No Carotid bruits. Trachea midline.   Cardiac: RRegular rate and rhythm. S1, S2  auscultated.   Lungs: Clear without wheezes, rales, rhonchi or dullness. Chest expansion symmetrical. Regular effort.  Abdomen: Soft, non-tender, +BS. No hepatosplenomegaly or appreciable masses.   Extremities: Without clubbing, cyanosis. Peripheral pulses are 1+. BLE edema +1 from feet to mid shin,Non-pitting up to pelvic crest, compression wraps  Neurologic: Motor and sensory nerves grossly intact.   Psych: Appropriate affect   Skin: Warm and dry. No obvious lesions, wounds, or ulcerations.      MEDICATIONS:  Scheduled Medications    furosemide  40 mg Oral Daily    propranolol  30 mg Oral TID    midodrine  5 mg Oral TID    digoxin  125 mcg Oral QOD    dilTIAZem ER  120 mg Oral Daily    apixaban  5 mg Oral BID    ferrous sulfate  325 mg Oral BID with meals    methIMAzole  20 mg Oral Daily    tamsulosin  0.4 mg Oral Daily @ 0700      ASSESSMENT:     Presented with one month of dyspnea, fatigue, and 60lb weight gain.      New Onset Afib, Unclear duration  - TSH abnormal   - s/p Cardizem gtt, rates were labile, evaluated by EP: Diltizem  mg daily, Propronolol 30 mg TID, Digoxin 0.125 mg every other day, Was persistent Afib but converted to sinus early this morning HR 60-90s  - Eliquis      Acute HFpEF/ Right Sided HF/ Wide open TR, Mod MR   Lymphedema  - , ECHO without RWMA, CXR small left pleural effusion, Albumin 3.2  - s/p Bumex gtt, Also on Aldactone, Net neg more than 26L, In comparison to standing wt on 8/1 she is down 64 lbs   - Diuretics were held with worsening renal function, resumed yesterday Lasix 20 mg daily, renal function is stable  - Compression wraps  - Appears compensated     Hypotension- Stable on Midodrine   Thyrotoxicosis w/ Crisis - Evaluated by endocrinology following, Methimazole   Acute Anemia - Hgb 6.9 yesterday, s/p 1unit PRBCs, now 8.1- Occult stool neg    Decreased Mobility - PT  Electrolyte Imbalance-  Cardiac Replacement   Urinary Retention - Urology evaluated, PO  Tamulosin     PLAN:  - Appears stable on Lasix 50 mg daily. Has converted to sinus rhythm and rates are 60s-90s. Appears stable for discharge from a cardiology standpoint. Our office will call her to schedule a follw-up visit with CHRISTOS Alonso within 7 days. BMP prior to visit.       ADDENDUM 1407: Patient had a 3.3 second sinus pause of telemetry with concurrent hypotension (SBP 70s) and lightheadedness. Did not receive Digoxin this morning. Hold Lasix, Digoxin, and Cardizem. Will have EP re-evaluate tomorrow. Hold discharge. Check EKG. Increase Midodrine.        HOSPITALIST:    Assessment and Plan:     Atrial fibrillation with rapid ventricular response  Due to hyperthyroidism  - TSH low, started on methimazole  - cards on consult  - off dilt gtt  - heparin gtt --> oral anticoag Eliquis  - Echo shows ejection fraction is 60 to 65%  - discharge today cancelled due to 3.3 second pause with hypotension and lightheadedness.EP go eval tomorrow.  - digoxin and diltiazem on hold     HFpEF  - Ejection fraction is 60 to 65%, cannot assess LV dysfunction due to heart rhythm   - was on bumex drip - IV diuresis held -> now on lasix PO but held  - Strict I's and O's and daily weights, replete e-lytes per protocol  - cards on consult     Severe hyperthyroidism  Graves' disease    - Admission FT4 high with suppressed TCH  - strong family history of autoimmune thyroid disease   - Was given PTU and dexamethasone in the ED  - Endocrinology consulted  - Swtiched Methimazole 20 mg twice daily --> Methimazole daily  - FT4 normalized  - Pt needs strict endo follow up OP in 2 weeks      Microcytic anemia  - Iron labs reveal ACD  - cont to monitor H/H, transfuse for Hgb <7  - pt never had a pap smear, colonscopy etc. Reports she is mostly a vegetarian due to own taste preference.   - Dietician consulted  - fecal occult negative  - hgb 6.9 8/4 required 1U pRBC transfusion with appropriate response     Urinary Retention  - unclear  etiology  - pt endorsing good BMs  - on diuretic, durbin inserted 8/1, spontaneously fell out 8/4  - flomax for now, dc on discharge  - urology consulted      DALTON   - Cr initially trended up to 1.15 from 0.6 on admission   - in setting of lasix vs retention vs other  - Feurea was intrinsic disease  - monitor diuretic carefully, strict I/os     Deconditioning  - PT, OOB         Subjective:     Became dizzy when shehad low BP and pause on tele.    MEDICATIONS ADMINISTERED IN LAST 1 DAY:  apixaban (Eliquis) tab 5 mg       Date Action Dose Route User    8/12/2024 0854 Given 5 mg Oral Mary Joyce RN    8/11/2024 2053 Given 5 mg Oral Theresa Leon RN          ferrous sulfate DR tab 325 mg       Date Action Dose Route User    8/12/2024 0854 Given 325 mg Oral Mary Joyce RN    8/11/2024 1701 Given 325 mg Oral Brionna Flores RN          furosemide (Lasix) tab 40 mg       Date Action Dose Route User    8/12/2024 0854 Given 40 mg Oral Mary Joyce RN          midodrine (ProAmatine) tab 5 mg       Date Action Dose Route User    8/11/2024 1231 Given 5 mg Oral Brionna Flores RN          midodrine (ProAmatine) tab 10 mg       Date Action Dose Route User    8/12/2024 0558 Given 10 mg Oral Theresa Leon RN    8/11/2024 1701 Given 10 mg Oral Brionna Flores RN          midodrine (ProAmatine) tab 5 mg       Date Action Dose Route User    8/11/2024 1428 Given 5 mg Oral Brionna Flores RN          propranolol (Inderal) tab 30 mg       Date Action Dose Route User    8/11/2024 2053 Given 30 mg Oral Theresa Leon RN          sodium chloride 0.9 % IV bolus 500 mL       Date Action Dose Route User    8/12/2024 0116 New Bag 500 mL Intravenous Theresa Leon RN          tamsulosin (Flomax) cap 0.4 mg       Date Action Dose Route User    8/12/2024 0558 Given 0.4 mg Oral Theresa Leon RN            Vitals (last day)       Date/Time Temp Pulse Resp BP SpO2 Weight O2 Device O2 Flow Rate (L/min) [facility]    08/12/24 0852 98 °F (36.7 °C) 55  16 100/59 98 % -- None (Room air) -- AH    08/12/24 0555 98.1 °F (36.7 °C) 47 18 97/61 98 % -- None (Room air) -- TW    08/12/24 0555 -- -- -- -- -- 187 lb 6.4 oz (85 kg) -- -- BP    08/12/24 0037 -- -- 18 92/60 100 % -- None (Room air) -- TW    08/11/24 2045 98.5 °F (36.9 °C) 57 18 101/62 98 % -- None (Room air) -- TW    08/11/24 1700 98.1 °F (36.7 °C) 57 18 100/60 100 % -- None (Room air) --     08/11/24 1500 -- 51 -- -- -- -- -- --     08/11/24 1408 -- 48 -- 90/59 -- -- -- -- LP    08/11/24 1401 -- 46 -- 79/49 -- -- -- -- LP    08/11/24 1258 -- 57 -- -- -- -- -- -- LP    08/11/24 1232 -- 61 -- 90/51 -- -- -- --     08/11/24 1229 -- 42 -- -- -- -- -- -- LP    08/11/24 0900 -- 70 18 106/56 99 % -- None (Room air) -- LP    08/11/24 0600 97.9 °F (36.6 °C) 73 16 90/51 99 % -- None (Room air) -- SS    08/11/24 0439 -- -- -- -- -- 185 lb 4.8 oz (84.1 kg) -- -- JULIA

## 2024-08-12 NOTE — PLAN OF CARE
Problem: CARDIOVASCULAR - ADULT  Goal: Absence of cardiac arrhythmias or at baseline  Description: INTERVENTIONS:  - Continuous cardiac monitoring, monitor vital signs, obtain 12 lead EKG if indicated  - Evaluate effectiveness of antiarrhythmic and heart rate control medications as ordered  - Initiate emergency measures for life threatening arrhythmias  - Monitor electrolytes and administer replacement therapy as ordered  Outcome: Progressing  Problem: SAFETY ADULT - FALL  Goal: Free from fall injury  Description: INTERVENTIONS:  - Assess pt frequently for physical needs  - Identify cognitive and physical deficits and behaviors that affect risk of falls.  - [place] fall precautions as indicated by assessment.  - Educate pt/family on patient safety including physical limitations  - Instruct pt to call for assistance with activity based on assessment  - Modify environment to reduce risk of injury  - Provide assistive devices as appropriate  - Consider OT/PT consult to assist with strengthening/mobility  - Encourage toileting schedule  Outcome: Progressing    Problem: PAIN - ADULT  Goal: Verbalizes/displays adequate comfort level or patient's stated pain goal  Description: INTERVENTIONS:  - Encourage pt to monitor pain and request assistance  - Assess pain using appropriate pain scale  - Administer analgesics based on type and severity of pain and evaluate response  - Implement non-pharmacological measures as appropriate and evaluate response  - Consider cultural and social influences on pain and pain management  - Manage/alleviate anxiety  - Utilize distraction and/or relaxation techniques  - Monitor for opioid side effects  - Notify MD/LIP if interventions unsuccessful or patient reports new pain  - Anticipate increased pain with activity and pre-medicate as appropriate  Outcome: Progressing      Patient aoX4 with no complaints of pain.  Patient with low BP and sinus ruba sustained in the 40s overnight (per tele  call, did go down to 37 momentarily, then back to 45-46).  Patient alert  and asymptomatic during this time.  MD and cardiology consult notified and patient given 500ml normal saline bolus.  Digoxin and cardizem still on hold and patient remains on PO daily lasix.  BLE still edematous (but improved, per pt). Daily weight and strict I&O; voids per kathy.  Safety precautions maintained. Call light within reach.

## 2024-08-13 ENCOUNTER — APPOINTMENT (OUTPATIENT)
Dept: NUCLEAR MEDICINE | Facility: HOSPITAL | Age: 54
End: 2024-08-13
Attending: INTERNAL MEDICINE
Payer: MEDICAID

## 2024-08-13 ENCOUNTER — APPOINTMENT (OUTPATIENT)
Dept: CV DIAGNOSTICS | Facility: HOSPITAL | Age: 54
End: 2024-08-13
Attending: INTERNAL MEDICINE
Payer: MEDICAID

## 2024-08-13 VITALS
OXYGEN SATURATION: 99 % | DIASTOLIC BLOOD PRESSURE: 64 MMHG | HEART RATE: 57 BPM | TEMPERATURE: 98 F | HEIGHT: 64 IN | WEIGHT: 187.5 LBS | BODY MASS INDEX: 32.01 KG/M2 | SYSTOLIC BLOOD PRESSURE: 96 MMHG | RESPIRATION RATE: 18 BRPM

## 2024-08-13 LAB
% OF MAX PREDICTED HR: 100 %
ANION GAP SERPL CALC-SCNC: 4 MMOL/L (ref 0–18)
BUN BLD-MCNC: 25 MG/DL (ref 9–23)
BUN/CREAT SERPL: 27.5 (ref 10–20)
CALCIUM BLD-MCNC: 8.6 MG/DL (ref 8.7–10.4)
CHLORIDE SERPL-SCNC: 99 MMOL/L (ref 98–112)
CO2 SERPL-SCNC: 31 MMOL/L (ref 21–32)
CREAT BLD-MCNC: 0.91 MG/DL
EGFRCR SERPLBLD CKD-EPI 2021: 75 ML/MIN/1.73M2 (ref 60–?)
GLUCOSE BLD-MCNC: 81 MG/DL (ref 70–99)
MAX DIASTOLIC BP: 51 MMHG
MAX HEART RATE: 69 BPM
MAX PREDICTED HEART RATE: 165 BPM
MAX SYSTOLIC BP: 113 MMHG
MAX WORK LOAD: 10
OSMOLALITY SERPL CALC.SUM OF ELEC: 281 MOSM/KG (ref 275–295)
POTASSIUM SERPL-SCNC: 4.2 MMOL/L (ref 3.5–5.1)
SODIUM SERPL-SCNC: 134 MMOL/L (ref 136–145)

## 2024-08-13 PROCEDURE — 99239 HOSP IP/OBS DSCHRG MGMT >30: CPT | Performed by: HOSPITALIST

## 2024-08-13 PROCEDURE — 93017 CV STRESS TEST TRACING ONLY: CPT | Performed by: INTERNAL MEDICINE

## 2024-08-13 PROCEDURE — 78452 HT MUSCLE IMAGE SPECT MULT: CPT | Performed by: INTERNAL MEDICINE

## 2024-08-13 RX ORDER — FLECAINIDE ACETATE 100 MG/1
100 TABLET ORAL EVERY 12 HOURS SCHEDULED
Qty: 60 TABLET | Refills: 3 | Status: SHIPPED | OUTPATIENT
Start: 2024-08-13

## 2024-08-13 RX ORDER — FLECAINIDE ACETATE 50 MG/1
100 TABLET ORAL EVERY 12 HOURS SCHEDULED
Status: DISCONTINUED | OUTPATIENT
Start: 2024-08-13 | End: 2024-08-13

## 2024-08-13 RX ORDER — MIDODRINE HYDROCHLORIDE 10 MG/1
10 TABLET ORAL 3 TIMES DAILY
Qty: 90 TABLET | Refills: 3 | Status: SHIPPED | OUTPATIENT
Start: 2024-08-13

## 2024-08-13 RX ORDER — REGADENOSON 0.08 MG/ML
INJECTION, SOLUTION INTRAVENOUS
Status: COMPLETED
Start: 2024-08-13 | End: 2024-08-13

## 2024-08-13 NOTE — PHYSICAL THERAPY NOTE
PHYSICAL THERAPY TREATMENT NOTE - INPATIENT     Room Number: 348/348-A       Presenting Problem: graves dz, afib, weight gain       Problem List  Principal Problem:    Atrial fibrillation with rapid ventricular response (HCC)  Active Problems:    Thyrotoxicosis with thyrotoxic crisis, unspecified thyrotoxicosis type    Acute congestive heart failure, unspecified heart failure type (HCC)    Cardiomyopathy, unspecified type (HCC)    Anemia, unspecified type    Acute heart failure (HCC)    Graves disease    Graves' orbitopathy      PHYSICAL THERAPY ASSESSMENT   Patient demonstrates good  progress this session, goals  remain in progress.      Patient is requiring stand-by assist as a result of the following impairments: decreased functional strength, decreased endurance/aerobic capacity, impaired standing balance, decreased muscular endurance, and medical status.     Patient continues to function below baseline with bed mobility, transfers, gait, stair negotiation, standing prolonged periods, and performing household tasks.  Next session anticipate patient to progress bed mobility, transfers, gait, and stair negotiation.  Physical Therapy will continue to follow patient for duration of hospitalization.    Patient continues to benefit from continued skilled PT services: at discharge to promote prior level of function and safety with additional support and return home with home health PT.    PLAN  PT Treatment Plan: Bed mobility;Body mechanics;Energy conservation;Endurance;Patient education;Family education;Gait training;Strengthening;Stair training;Transfer training;Balance training  Frequency (Obs): 3-5x/week    SUBJECTIVE  \"I'm supposed to go home today\"    OBJECTIVE  Precautions:  (fluid retention BLEs)    PAIN ASSESSMENT   [place]  Location: denies pain       BALANCE  Static Sitting: Good  Dynamic Sitting: Fair +  Static Standing: Fair  Dynamic Standing: Fair -    ACTIVITY TOLERANCE  Pulse: 61  Heart Rate Source:  Monitor     BP: 100/54  BP Location: Right arm  BP Method: Automatic  Patient Position: Sitting     O2 WALK  Oxygen Therapy  SPO2% on Room Air at Rest: 100  SPO2% Ambulation on Room Air: 99    AM-PAC '6-Clicks' INPATIENT SHORT FORM - BASIC MOBILITY  How much difficulty does the patient currently have...  Patient Difficulty: Turning over in bed (including adjusting bedclothes, sheets and blankets)?: A Little   Patient Difficulty: Sitting down on and standing up from a chair with arms (e.g., wheelchair, bedside commode, etc.): A Little   Patient Difficulty: Moving from lying on back to sitting on the side of the bed?: A Little   How much help from another person does the patient currently need...   Help from Another: Moving to and from a bed to a chair (including a wheelchair)?: A Little   Help from Another: Need to walk in hospital room?: A Little   Help from Another: Climbing 3-5 steps with a railing?: A Little     AM-PAC Score:  Raw Score: 18   Approx Degree of Impairment: 46.58%   Standardized Score (AM-PAC Scale): 43.63   CMS Modifier (G-Code): CK    FUNCTIONAL ABILITY STATUS  Functional Mobility/Gait Assessment  Gait Assistance:  (SBA)  Distance (ft): 200 ft  Assistive Device: None (utilizing hallway railing intermittently)  Pattern: Shuffle (decreased jaison speed, wide SANJEEV, decreased step length, increased lateral weight shifting L/R)  Supine to Sit: stand-by assist  Sit to Stand: stand-by assist    Skilled Therapy Provided: Patient in bed upon arrival. RN approved activity. Educated patient on POC and benefits of mobilization. Agreeable to participate. Patient reporting no pain. Patient tolerated increased activity and ambulation this session compared to last session. Patient denies any functional mobility concerns about returning home, reporting that family will be able to assist as needed. Patient declining use of cane or rolling walker while ambulating, preferring to hold onto hallway railing and  furniture as needed. Patient declining stair negotiation trial during this session.     The patient's Approx Degree of Impairment: 46.58% has been calculated based on documentation in the [facility] '6 clicks' Inpatient Daily Activity Short Form.  Research supports that patients with this level of impairment may benefit from HHPT.  Final disposition will be made by interdisciplinary medical team.    Patient End of Session: Up in chair;Needs met;Call light within reach;RN aware of session/findings;All patient questions and concerns addressed    CURRENT GOALS   Goals to be met by: 24  Patient Goal Patient's self-stated goal is: less fluid, improved mobility   Goal #1 Patient is able to demonstrate supine - sit EOB @ level: modified independent      Goal #1   Current Status  SBA   Goal #2 Patient is able to demonstrate transfers Sit to/from Stand at assistance level: modified independent with none      Goal #2  Current Status  SBA    Goal #3 Patient is able to ambulate 150 feet with assist device: none at assistance level: supervision   Goal #3   Current Status   ft holding onto hallway railing   Goal #4 Patient will negotiate 14 stairs with rail and supervision   Goal #4   Current Status  NT   Goal #5 Patient to demonstrate independence with home activity/exercise instructions provided to patient in preparation for discharge.   Goal #5   Current Status  Ongoing     Gait Trainin minutes

## 2024-08-13 NOTE — PLAN OF CARE
Problem: Patient Centered Care  Goal: Patient preferences are identified and integrated in the patient's plan of care  Description: Interventions:  - What would you like us to know as we care for you? From home with   - Provide timely, complete, and accurate information to patient/family  - Incorporate patient and family knowledge, values, beliefs, and cultural backgrounds into the planning and delivery of care  - Encourage patient/family to participate in care and decision-making at the level they choose  - Honor patient and family perspectives and choices  Outcome: Progressing     Problem: Patient/Family Goals  Goal: Patient/Family Long Term Goal  Description: Patient's Long Term Goal: go home    Interventions:  - follow md orders  - See additional Care Plan goals for specific interventions  Outcome: Progressing  Goal: Patient/Family Short Term Goal  Description: Patient's Short Term Goal: remain safe in hospital    Interventions:   - monitor on tele  - heparin gtt  - See additional Care Plan goals for specific interventions  Outcome: Progressing     Problem: CARDIOVASCULAR - ADULT  Goal: Maintains optimal cardiac output and hemodynamic stability  Description: INTERVENTIONS:  - Monitor vital signs, rhythm, and trends  - Monitor for bleeding, hypotension and signs of decreased cardiac output  - Evaluate effectiveness of vasoactive medications to optimize hemodynamic stability  - Monitor arterial and/or venous puncture sites for bleeding and/or hematoma  - Assess quality of pulses, skin color and temperature  - Assess for signs of decreased coronary artery perfusion - ex. Angina  - Evaluate fluid balance, assess for edema, trend weights  Outcome: Progressing  Goal: Absence of cardiac arrhythmias or at baseline  Description: INTERVENTIONS:  - Continuous cardiac monitoring, monitor vital signs, obtain 12 lead EKG if indicated  - Evaluate effectiveness of antiarrhythmic and heart rate control medications as  ordered  - Initiate emergency measures for life threatening arrhythmias  - Monitor electrolytes and administer replacement therapy as ordered  Outcome: Progressing     Problem: SAFETY ADULT - FALL  Goal: Free from fall injury  Description: INTERVENTIONS:  - Assess pt frequently for physical needs  - Identify cognitive and physical deficits and behaviors that affect risk of falls.  - [place] fall precautions as indicated by assessment.  - Educate pt/family on patient safety including physical limitations  - Instruct pt to call for assistance with activity based on assessment  - Modify environment to reduce risk of injury  - Provide assistive devices as appropriate  - Consider OT/PT consult to assist with strengthening/mobility  - Encourage toileting schedule  Outcome: Progressing     Problem: DISCHARGE PLANNING  Goal: Discharge to home or other facility with appropriate resources  Description: INTERVENTIONS:  - Identify barriers to discharge w/pt and caregiver  - Include patient/family/discharge partner in discharge planning  - Arrange for needed discharge resources and transportation as appropriate  - Identify discharge learning needs (meds, wound care, etc)  - Arrange for interpreters to assist at discharge as needed  - Consider post-discharge preferences of patient/family/discharge partner  - Complete POLST form as appropriate  - Assess patient's ability to be responsible for managing their own health  - Refer to Case Management Department for coordinating discharge planning if the patient needs post-hospital services based on physician/LIP order or complex needs related to functional status, cognitive ability or social support system  Outcome: Progressing     Problem: PAIN - ADULT  Goal: Verbalizes/displays adequate comfort level or patient's stated pain goal  Description: INTERVENTIONS:  - Encourage pt to monitor pain and request assistance  - Assess pain using appropriate pain scale  - Administer analgesics  based on type and severity of pain and evaluate response  - Implement non-pharmacological measures as appropriate and evaluate response  - Consider cultural and social influences on pain and pain management  - Manage/alleviate anxiety  - Utilize distraction and/or relaxation techniques  - Monitor for opioid side effects  - Notify MD/LIP if interventions unsuccessful or patient reports new pain  - Anticipate increased pain with activity and pre-medicate as appropriate  Outcome: Progressing

## 2024-08-13 NOTE — PLAN OF CARE
AxOx4, RA, NSR, Cont x2, SBA  PLAN: discharge later today if feeling better   Problem: Patient Centered Care  Goal: Patient preferences are identified and integrated in the patient's plan of care  Description: Interventions:  - What would you like us to know as we care for you? From home with   - Provide timely, complete, and accurate information to patient/family  - Incorporate patient and family knowledge, values, beliefs, and cultural backgrounds into the planning and delivery of care  - Encourage patient/family to participate in care and decision-making at the level they choose  - Honor patient and family perspectives and choices  8/13/2024 1249 by Cydney Fernández RN  Outcome: Adequate for Discharge  8/13/2024 1248 by Cydney Fernández RN  Outcome: Progressing     Problem: Patient/Family Goals  Goal: Patient/Family Long Term Goal  Description: Patient's Long Term Goal: go home    Interventions:  - follow md orders  - See additional Care Plan goals for specific interventions  8/13/2024 1249 by Cydney Fernández RN  Outcome: Adequate for Discharge  8/13/2024 1248 by Cydney Fernández RN  Outcome: Progressing  Goal: Patient/Family Short Term Goal  Description: Patient's Short Term Goal: remain safe in hospital    Interventions:   - monitor on tele  - heparin gtt  - See additional Care Plan goals for specific interventions  8/13/2024 1249 by Cydney Fernández RN  Outcome: Adequate for Discharge  8/13/2024 1248 by Cydney Fernández RN  Outcome: Progressing     Problem: CARDIOVASCULAR - ADULT  Goal: Maintains optimal cardiac output and hemodynamic stability  Description: INTERVENTIONS:  - Monitor vital signs, rhythm, and trends  - Monitor for bleeding, hypotension and signs of decreased cardiac output  - Evaluate effectiveness of vasoactive medications to optimize hemodynamic stability  - Monitor arterial and/or venous puncture sites for bleeding and/or hematoma  - Assess quality of pulses, skin color and temperature  - Assess  for signs of decreased coronary artery perfusion - ex. Angina  - Evaluate fluid balance, assess for edema, trend weights  8/13/2024 1249 by Cydney Fernández RN  Outcome: Adequate for Discharge  8/13/2024 1248 by Cydney Fernández RN  Outcome: Progressing  Goal: Absence of cardiac arrhythmias or at baseline  Description: INTERVENTIONS:  - Continuous cardiac monitoring, monitor vital signs, obtain 12 lead EKG if indicated  - Evaluate effectiveness of antiarrhythmic and heart rate control medications as ordered  - Initiate emergency measures for life threatening arrhythmias  - Monitor electrolytes and administer replacement therapy as ordered  8/13/2024 1249 by Cydney Fernández RN  Outcome: Adequate for Discharge  8/13/2024 1248 by Cydney Fernández RN  Outcome: Progressing     Problem: SAFETY ADULT - FALL  Goal: Free from fall injury  Description: INTERVENTIONS:  - Assess pt frequently for physical needs  - Identify cognitive and physical deficits and behaviors that affect risk of falls.  - [place] fall precautions as indicated by assessment.  - Educate pt/family on patient safety including physical limitations  - Instruct pt to call for assistance with activity based on assessment  - Modify environment to reduce risk of injury  - Provide assistive devices as appropriate  - Consider OT/PT consult to assist with strengthening/mobility  - Encourage toileting schedule  8/13/2024 1249 by Cydney Fernández RN  Outcome: Adequate for Discharge  8/13/2024 1248 by Cydney Fernández RN  Outcome: Progressing     Problem: DISCHARGE PLANNING  Goal: Discharge to home or other facility with appropriate resources  Description: INTERVENTIONS:  - Identify barriers to discharge w/pt and caregiver  - Include patient/family/discharge partner in discharge planning  - Arrange for needed discharge resources and transportation as appropriate  - Identify discharge learning needs (meds, wound care, etc)  - Arrange for interpreters to assist at discharge as  needed  - Consider post-discharge preferences of patient/family/discharge partner  - Complete POLST form as appropriate  - Assess patient's ability to be responsible for managing their own health  - Refer to Case Management Department for coordinating discharge planning if the patient needs post-hospital services based on physician/LIP order or complex needs related to functional status, cognitive ability or social support system  8/13/2024 1249 by Cydney Fernández, RN  Outcome: Adequate for Discharge  8/13/2024 1248 by Cydney Fernández RN  Outcome: Progressing     Problem: PAIN - ADULT  Goal: Verbalizes/displays adequate comfort level or patient's stated pain goal  Description: INTERVENTIONS:  - Encourage pt to monitor pain and request assistance  - Assess pain using appropriate pain scale  - Administer analgesics based on type and severity of pain and evaluate response  - Implement non-pharmacological measures as appropriate and evaluate response  - Consider cultural and social influences on pain and pain management  - Manage/alleviate anxiety  - Utilize distraction and/or relaxation techniques  - Monitor for opioid side effects  - Notify MD/LIP if interventions unsuccessful or patient reports new pain  - Anticipate increased pain with activity and pre-medicate as appropriate  8/13/2024 1249 by Cydney Fernández, RN  Outcome: Adequate for Discharge  8/13/2024 1248 by Cydney Fernández, RN  Outcome: Progressing

## 2024-08-13 NOTE — PROGRESS NOTES
Pt discharged home with son in stable condition. IV and tele removed. AVS printed and sent with pt

## 2024-08-13 NOTE — PROGRESS NOTES
Progress Note  Shubham Zurita Patient Status:  Inpatient    1970 MRN [id number]   Location [facility] 3W/SW Attending Sara Leiva MD   Hosp Day # 15 PCP No primary care provider on file.     SUBJECTIVE:    Denies complaints. Feels better today, ready to go home.     VITALS:  /55 (BP Location: Right arm)   Pulse 65   Temp 98 °F (36.7 °C) (Oral)   Resp 18   Ht 5' 4\" (1.626 m)   Wt 187 lb 8 oz (85 kg)   SpO2 100%   BMI 32.18 kg/m²     INTAKE/OUTPUT:    Intake/Output Summary (Last 24 hours) at 2024 1027  Last data filed at 2024 1900  Gross per 24 hour   Intake 480 ml   Output --   Net 480 ml     Last 3 Weights   24 0623 187 lb 8 oz (85 kg)   24 0555 187 lb 6.4 oz (85 kg)   24 0439 185 lb 4.8 oz (84.1 kg)   08/10/24 0629 185 lb 3.2 oz (84 kg)   24 0412 184 lb 4.8 oz (83.6 kg)   24 0606 183 lb 3.2 oz (83.1 kg)   24 0449 181 lb 4.8 oz (82.2 kg)   24 0606 184 lb 9.6 oz (83.7 kg)   24 0104 203 lb 6.4 oz (92.3 kg)   24 0434 215 lb 4.8 oz (97.7 kg)   24 0445 232 lb 8 oz (105.5 kg)   24 0510 241 lb 4.8 oz (109.5 kg)   24 0415 248 lb 3.2 oz (112.6 kg)   24 1049 243 lb 9.6 oz (110.5 kg)   24 0300 248 lb 12.8 oz (112.9 kg)   24 0007 244 lb 1.6 oz (110.7 kg)   24 1835 242 lb 8.1 oz (110 kg)   24 1730 240 lb (108.9 kg)     LABS:  Recent Labs   Lab 24  0605 24  0555 24  0445   GLU 79 83 81   BUN 20 25* 25*   CREATSERUM 0.91 0.96 0.91   EGFRCR 75 70 75   CA 8.6* 8.6* 8.6*   * 133* 134*   K 4.2 4.4 4.2   CL 97* 98 99   CO2 33.0* 30.0 31.0     Recent Labs   Lab 24  0600 24  0814 08/10/24  0731 24  0605 24  0555   RBC 3.43* 3.61* 3.72* 3.72* 3.57*   HGB 7.5* 7.8* 8.2* 8.1* 8.0*   HCT 24.5* 26.0* 27.2* 27.0* 25.5*   MCV 71.4* 72.0* 73.1* 72.6* 71.4*   MCH 21.9* 21.6* 22.0* 21.8* 22.4*   MCHC 30.6* 30.0* 30.1* 30.0* 31.4   RDW 18.3* 18.4* 18.6*  18.6* 18.7*   NEPRELIM 2.65 3.07 2.88  --   --    WBC 4.9 5.2 5.1 4.2 5.0   .0 225.0 231.0 238.0 285.0     No results for input(s): \"TROP\", \"CK\" in the last 168 hours.    DIAGNOSTICS:    TELEMETRY: SB overnight, lowest 48. No pauses     ROS: Negative unless noted above     PHYSICAL EXAM:  General: Alert and oriented x 3. No apparent distress.  HEENT: Normocephalic, sclera are nonicteric. Hearing appropriate bilaterally.  Neck: No JVD. No Carotid bruits. Trachea midline.   Cardiac: Regular rate and rhythm. S1, S2 auscultated.   Lungs: Clear without wheezes, rales, rhonchi or dullness. Chest expansion symmetrical. Regular effort.  Abdomen: Soft, non-tender, +BS. No hepatosplenomegaly or appreciable masses.   Extremities: Without clubbing, cyanosis. Peripheral pulses are 1+. BLE edema +1 from feet to mid shin,Non-pitting up to pelvic crest, compression wraps  Neurologic: Motor and sensory nerves grossly intact.   Psych: Appropriate affect   Skin: Warm and dry. No obvious lesions, wounds, or ulcerations.     MEDICATIONS:   midodrine  10 mg Oral TID    furosemide  40 mg Oral Daily    propranolol  30 mg Oral TID    apixaban  5 mg Oral BID    ferrous sulfate  325 mg Oral BID with meals    methIMAzole  20 mg Oral Daily    tamsulosin  0.4 mg Oral Daily @ 0700     ASSESSMENT:    Presented with one month of dyspnea, fatigue, and 60lb weight gain.     New Onset Afib, Unclear duration  Sinus Bradycardia   - TSH abnormal   - s/p Cardizem gtt, rates were labile, evaluated by EP, initially on Diltizem  mg daily, Propronolol 30 mg TID, Digoxin 0.125 mg every other day, once she converted to sinus she developed symptomatic bradycardia and hypotension. Re-revaluated by EP. 8/13 nuclear stress test with normal perfusion  - Eliquis     Acute HFpEF/ Right Sided HF/ Wide open TR, Mod MR   Lymphedema  - , ECHO without RWMA, CXR small left pleural effusion, Albumin 3.2  - s/p Bumex gtt, Also on Aldactone  - Lasix 40 mg  daily, renal function is stable  - Compression wraps  - Appears compensated    Hypotension  - Midodrine 10 mg TID, Bps are stable     Thyrotoxicosis w/ Crisis - Evaluated by endocrinology following, Methimazole   Acute Anemia - Hgb 6.9 yesterday, s/p 1unit PRBCs, now 8 yesterday- Occult stool neg    Decreased Mobility - PT  Electrolyte Imbalance-  Cardiac Replacement   Urinary Retention - Urology evaluated, PO Tamulosin    PLAN:  - EP recommendations reviewed, her stress test showed normal perfusion. No further symptomatic brachycardia episodes and no pauses. Will continue propranolol at 30 mg TID and start Flecainide 100 mg BID. Stay off digoxin and diltiazem. If she continues to feel well can discharge later this afternoon from a cardiology standpoint. Our office will call her to schedule a follow up appointment, she will also follow with the [facility] heart failure clinic.   - Outpatient sleep study with [facility]     Plan of care discussed with patient and RN.     Chery Garsia, APRN  8/13/2024  10:27 AM  [phone] ([place])  [phone] ([place])

## 2024-08-14 ENCOUNTER — PATIENT OUTREACH (OUTPATIENT)
Dept: CASE MANAGEMENT | Age: 54
End: 2024-08-14

## 2024-08-14 DIAGNOSIS — I50.9 ACUTE CONGESTIVE HEART FAILURE, UNSPECIFIED HEART FAILURE TYPE (HCC): Primary | ICD-10-CM

## 2024-08-14 RX ORDER — FUROSEMIDE 40 MG/1
40 TABLET ORAL DAILY
Qty: 30 TABLET | Refills: 3 | Status: SHIPPED | OUTPATIENT
Start: 2024-08-14

## 2024-08-14 NOTE — PAYOR COMM NOTE
--------------  DISCHARGE REVIEW    Payor: [facility]  Subscriber #:  [id number]  Authorization Number: [id number]    Admit date: 24  Admit time:   9:42 PM  Discharge Date: 2024  5:42 PM     Admitting Physician: Cynthia Aguilar MD  Attending Physician:  No att. providers found  Primary Care Physician: No primary care provider on file.          Discharge Summary Notes        Discharge Summary signed by Matt Sierra MD at 2024  1:41 PM       Author: Matt Sierra MD Specialty: HOSPITALIST, HOSPITALIST Author Type: Physician    Filed: 2024  1:41 PM Date of Service: 2024  1:38 PM Status: Addendum    : Matt Sierra MD (Physician)    Related Notes: Original Note by Matt Sierra MD (Physician) filed at 2024 10:26 AM         [facility]  Discharge Summary     Shubham Zurita  : 1970    Status: Inpatient  Day #: 15    Attending: Matt Sierra MD  PCP: No primary care provider on file.     Date of Admission:  2024  Date of Discharge:  2024     Hospital Discharge Diagnoses:     Atrial fibrillation with RVR  Acute diastolic CHF  Severe hyperthyroidism  Graves disease  Anemia of chronic disease  Urinary retention, acute   DALTON  Deconditioning      History of Present Illness:     Copied from Admission H&P:    The patient is a 54-year-old [place] female who came into the emergency department for evaluation of progressive weight gain and dyspnea. She does not usually see a physician and does not take any medications at home. CBC was unremarkable, except for hemoglobin of 7.1 and MCV 73.7 suggestive of iron deficiency anemia. Liver function test and chemistry were roughly unremarkable. Troponin was negative. Magnesium 1.8. TSH was 0.008. Free T4 still pending. Noted to be in atrial fibrillation with rapid ventricular response, rate of 130. B natriuretic peptide 426, and chest x-ray showed cardiomegaly with small left pleural effusion. Patient  was initiated on IV Cardizem, Lasix, heparin, and she will be admitted to the hospital for further management.       Hospital Course:     Atrial fibrillation with rapid ventricular response  Due to hyperthyroidism  - TSH low, started on methimazole  - cards on consult  - was ruba and hypotensive. Diltiazem and digoxin held. Now stable on flecanide, propanolol and midodrine  - heparin gtt --> oral anticoag Eliquis  - Echo shows ejection fraction is 60 to 65%  - stress test today normal     HFpEF  - Ejection fraction is 60 to 65%, cannot assess LV dysfunction due to heart rhythm   - was on bumex drip - IV diuresis held -> now on lasix PO  - Strict I's and O's and daily weights, repleted e-lytes per protocol  - cards on consult     Severe hyperthyroidism  Graves' disease    - Admission FT4 high with suppressed TCH  - strong family history of autoimmune thyroid disease   - Was given PTU and dexamethasone in the ED  - Endocrinology consulted  - Swtiched Methimazole 20 mg twice daily --> Methimazole daily  - FT4 normalized  - Pt needs strict endo follow up OP in 2 weeks      Microcytic anemia  - Iron labs reveal ACD  - cont to monitor H/H, transfuse for Hgb <7  - pt never had a pap smear, colonscopy etc. Reports she is mostly a vegetarian due to own taste preference.   - Dietician consulted  - fecal occult negative  - hgb 6.9 8/4 required 1U pRBC transfusion with appropriate response     Urinary Retention  - unclear etiology  - pt endorsing good BMs  - on diuretic, durbin inserted 8/1, spontaneously fell out 8/4  - flomax given, dc on discharge  - urology consulted   - urinating ok now     DALTON   - Cr initially trended up to 1.15 from 0.6 on admission   - in setting of lasix vs retention vs other  - Feurea was intrinsic disease  - resolved     Deconditioning  - PT, OOB      Consultants         Provider   Role Specialty     Dwain Kellogg MD      Consulting Physician Cardiac Electrophysiology     Liberty Fuentes MD       Consulting Physician UROLOGY     Scott Black MD      Consulting Physician ENDOCRINOLOGY     Steve Mcfarlane MD      Consulting Physician Interventional, Cardiology             Physical Exam:   Blood pressure 104/59, pulse 62, temperature 98 °F (36.7 °C), temperature source Oral, resp. rate 18, height 5' 4\" (1.626 m), weight 187 lb 8 oz (85 kg), SpO2 100%.  General:  Alert, no distress  HEENT:  Normocephalic, atraumatic  Cardiac:  Regular rate, regular rhythm  Pulmonary:  Clear to auscultation bilaterally, respirations unlabored  Gastrointestinal:  Soft, non-tender, normal bowel sounds  Musculoskeletal:  No joint swelling  Extremities:  No edema, no cyanosis, no clubbing  Neurologic:  nonfocal  Psychiatric:  Normal affect, calm and appropriate         Discharge Medications        START taking these medications        Instructions Prescription details   apixaban 5 MG Tabs  Commonly known as: Eliquis      Take 1 tablet (5 mg total) by mouth 2 (two) times daily.   Quantity: 60 tablet  Refills: 3     ferrous sulfate 325 (65 FE) MG Tbec      Take 1 tablet (325 mg total) by mouth 2 (two) times daily with meals.   Quantity: 60 tablet  Refills: 0     flecainide 100 MG Tabs  Commonly known as: Tambocor      Take 1 tablet (100 mg total) by mouth every 12 (twelve) hours.   Quantity: 60 tablet  Refills: 3     furosemide 40 MG Tabs  Commonly known as: Lasix      Take 1 tablet (40 mg total) by mouth daily.   Quantity: 30 tablet  Refills: 3     methIMAzole 10 MG Tabs  Commonly known as: Tapazole      Take 2 tablets (20 mg total) by mouth daily.   Quantity: 30 tablet  Refills: 1     midodrine 10 MG Tabs  Commonly known as: ProAmatine      Take 1 tablet (10 mg total) by mouth in the morning and 1 tablet (10 mg total) at noon and 1 tablet (10 mg total) in the evening.   Quantity: 90 tablet  Refills: 3     propranolol 10 MG Tabs  Commonly known as: Inderal      Take 3 tablets (30 mg total) by mouth 3 (three) times daily.    Quantity: 90 tablet  Refills: 3               Where to Get Your Medications        These medications were sent to [id number] - [place], [place] - [address] AT [facility], [phone], [phone]  [address], [place] [facility] [address]      Hours: 24-hours Phone: [phone]   apixaban 5 MG Tabs  ferrous sulfate 325 (65 FE) MG Tbec  flecainide 100 MG Tabs  furosemide 40 MG Tabs  methIMAzole 10 MG Tabs  midodrine 10 MG Tabs  propranolol 10 MG Tabs        Follow-up Information       Esthela Dennison APRN Follow up in 3 day(s).    Specialties: Nurse Practitioner, CARDIOLOGY  Why: Office will call to schedule your follow up visit to see Celeste within 3-7 days  Contact information:  [address]  [address]  [facility] [address]  [phone]               Scott Black MD Follow up in 2 week(s).    Specialty: ENDOCRINOLOGY  Contact information:  [address]  [address]  [facility] [address]  [phone]                             Hospital Discharge Diagnoses:  afib with RVR    Lace+ Score: 58  59-90 High Risk  29-58 Medium Risk  0-28   Low Risk.    TCM Follow-Up Recommendation:  LACE 29-58: Moderate Risk of readmission after discharge from the hospital.        I spent >30 minutes on this discharge. Discussed treatment and discharge plans.       Matt Sierra MD      Electronically signed by Matt Sierra MD on 8/13/2024  1:41 PM         REVIEWER COMMENTS

## 2024-08-14 NOTE — PROGRESS NOTES
Initial Post Discharge Follow Up   Discharge Date: 8/13/24  Contact Date: 8/14/2024    Consent Verification:  Assessment Completed With: Patient  HIPAA Verified?  Yes    Discharge Dx:     Atrial fibrillation with RVR  Acute diastolic CHF  Severe hyperthyroidism  Graves disease  Anemia of chronic disease  Urinary retention, acute   DALTON  Deconditioning  General:   How have you been since your discharge from the hospital?   Patient states she is doing much better since being home from the hospital. Pt states she has changed her diet last night and trying to eat more fresh fruits and vegetables. The patient denies chest pain, palpitations, shortness of breath, increased swelling in her extremities or abdomen. The patient denies nausea, vomiting, diarrhea. The patient denies a sudden weight gain. Patient states she was not able to  the Furosemide. (See RN intervention below)     NCM confirmed with patient she has a PCP- Dr. Reed-- but she states she is planning on following up with another PCP that is closer to her home. She does not have the name of the new PCP but she states her  is working on scheduling this appointment and is calling the insurance today. She states she has an appointment with Dr. Kellogg ([facility]) tomorrow.    NCM introduced the TCC and goals of care-- patient declined to schedule stating they will see Dr. Kellogg tomorrow and follow up with PCP.     Do you have any pain since discharge?  No   When you were leaving the hospital were your discharge instructions reviewed with you? Yes  How well were your discharge instructions explained to you?   On a scale of 1-5   1- Very Poor and 5- Very well   Very Well  Do you have any questions about your discharge instructions?  No  Before leaving the hospital was your diagnoses explained to you? Yes  Do you have any questions about your diagnoses? No  Are you able to perform normal daily activities of living as you have prior to your hospital stay  (dressing, bathing, ambulating to the bathroom, etc)? yes  (NCM) Was patient given a different diet per AVS? yes  If so, which diet? CHF   Your recommended sodium intake is 5472-6762 mg daily.  Are there any barriers to following this diet? no    Medications:   Current Outpatient Medications   Medication Sig Dispense Refill    flecainide 100 MG Oral Tab Take 1 tablet (100 mg total) by mouth every 12 (twelve) hours. 60 tablet 3    midodrine 10 MG Oral Tab Take 1 tablet (10 mg total) by mouth in the morning and 1 tablet (10 mg total) at noon and 1 tablet (10 mg total) in the evening. 90 tablet 3    furosemide 40 MG Oral Tab Take 1 tablet (40 mg total) by mouth daily. 30 tablet 3    propranolol 10 MG Oral Tab Take 3 tablets (30 mg total) by mouth 3 (three) times daily. 90 tablet 3    methIMAzole 10 MG Oral Tab Take 2 tablets (20 mg total) by mouth daily. 30 tablet 1    ferrous sulfate 325 (65 FE) MG Oral Tab EC Take 1 tablet (325 mg total) by mouth 2 (two) times daily with meals. 60 tablet 0    apixaban 5 MG Oral Tab Take 1 tablet (5 mg total) by mouth 2 (two) times daily. 60 tablet 3     Were there any changes to your current medication(s) noted on the AVS? Yes  NCSUSIE reviewed the following changes with the patient:   START taking:  apixaban (Eliquis)  ferrous sulfate  flecainide (Tambocor)  furosemide (Lasix)  methIMAzole (Tapazole)  midodrine (ProAmatine)  propranolol (Inderal)  If so, were these medication changes discussed with you prior to leaving the hospital? Yes  If a new medication was prescribed:    Was the new medication's purpose & side effects reviewed? Yes  Do you have any questions about your new medication? No  Did you  your discharge medications when you left the hospital? Yes except the Furosemide. KARL sees confirmation through [employer] that prescription was received. KARL called [facility] pharmacy and spoke with pharmacist Debi. She states there was no rx received for Furosemide and she checked  multiple times. NC sent a secure chat to RANDY Garsia, JULIAN Lovell and explained the situation and she resent the rx. KARL called [facility] pharmacy back and spoke with Kirbyherb and was placed on hold for 10 minutes. Then I was told the rx was now received and will be ready in 30 minutes. NC called the patient back and updated her, she verbalized understanding and will go  today.     furosemide 40 MG Oral Tab 30 tablet 3 8/14/2024 --   Sig:   Take 1 tablet (40 mg total) by mouth daily.     Route:   Oral         Let's go over your medications together to make sure we are not missing anything. Medications Reviewed  Are there any reasons that keep you from taking your medication as prescribed? No    Discharge medications reviewed/discussed/and reconciled against outpatient medications with patient.  Any changes or updates to medications sent to PCP.  Patient Acknowledged     Referrals/orders at D/C:  Referrals/orders placed at D/C? yes--- lab order   What services:   Basic Metabolic Panel (8)  Complete by: 08/18/24    DME ordered at D/C? No      Discharge orders, AVS reviewed and discussed with patient. Any changes or updates to orders sent to PCP.  Patient Acknowledged      SDOH:     Transportation Needs: No Transportation Needs (7/30/2024)    Transportation Needs     Lack of Transportation: No     Car Seat: Not on file       Diagnosis specifics:   CHF:   With your CHF diagnosis weighing yourself is very important  How often are you weighing yourself? Daily   Is there any reason you are unable to weigh yourself daily?  No  What was your weight yesterday? 188 lbs    Today? 187 lbs   Were you told about any fluid restrictions? Yes  Have you noticed any shortness of breath or waking up short of breath? no  Since discharge do you feel you are urinating more or less?  No     Are you urinating more at night? no    Do you notice any pain or swelling in your abdomen?   no      Ankles or Legs?    no  Questions/Concerns: none at this time     Follow up appointments:      TCC  Was TCC ordered: No    PCP (If no TCC appointment)  Does patient already have a PCP appointment scheduled? No      Specialist    Does the patient have any other follow up appointment(s) needing to be scheduled? No- patient reports she is scheduled tomorrow with Dr. Kellogg for a follow up.   Follow up with Esthela Dennison in 3 day(s)  Specialty: Nurse Practitioner, CARDIOLOGY  Office will call to schedule your follow up visit to see Celeste within 3-7 days  [facility] [place]  [address]  [address]  [place] [address]  [phone]        Is there any reason as to why you cannot make your appointment(s)?  No     Needs post D/C:   Now that you are home, are there any needs or concerns you need addressed before your next visit with your PCP?  (DME, meds, questions, etc.): Yes    Interventions by [facility]:   NCM advised patient to weigh first thing in the morning after voiding and before meals/fluids. To notify health care provider if she gains 2-3 lbs., overnight or 5 lbs in 7 days. Continue with following advised sodium and fluid restriction. To notify health care provider if increased swelling in legs,abdomen, increased difficulty breathing, or any worsening s/s.  Patient verbalized understanding and no further questions at this time. All d/c instructions reviewed with pt.  Reviewed when to call MD vs when to go to ER/call 911.  Educated pt on the importance of taking all meds as prescribed as well as close f/u with PCP/specialists.  Pt verbalized understanding and will contact office with any further questions or concerns.   Furosemide 40 mg: KARL sees confirmation through [employer] that prescription was received. [facility] and spoke with pharmacist Debi. She states there was no rx received for Furosemide and she checked multiple times. NC sent a secure chat to Chery Cerna APRN and explained the situation and  she resent the rx. KARL called [facility] pharmacy back and spoke with Mariana and was placed on hold for 10 minutes. Then I was told the rx was now received and will be ready in 30 minutes. KARL called the patient back and updated her, she verbalized understanding and will go  today.      Overall Rating:   How would you rate the care you received while in the hospital? excellent    CCM referral placed:    Not Applicable

## 2024-08-16 ENCOUNTER — TELEPHONE (OUTPATIENT)
Dept: ENDOCRINOLOGY CLINIC | Facility: CLINIC | Age: 54
End: 2024-08-16

## 2024-08-16 NOTE — TELEPHONE ENCOUNTER
states patient needs to be seen within 2 weeks for hospital follow up the schedule booking until October please call

## 2024-09-03 ENCOUNTER — OFFICE VISIT (OUTPATIENT)
Facility: LOCATION | Age: 54
End: 2024-09-03

## 2024-09-03 ENCOUNTER — LAB ENCOUNTER (OUTPATIENT)
Dept: LAB | Age: 54
End: 2024-09-03
Attending: INTERNAL MEDICINE
Payer: MEDICAID

## 2024-09-03 VITALS
WEIGHT: 179.81 LBS | BODY MASS INDEX: 30.7 KG/M2 | HEIGHT: 64 IN | HEART RATE: 67 BPM | SYSTOLIC BLOOD PRESSURE: 138 MMHG | OXYGEN SATURATION: 99 % | DIASTOLIC BLOOD PRESSURE: 60 MMHG

## 2024-09-03 DIAGNOSIS — M79.89 LEG SWELLING: ICD-10-CM

## 2024-09-03 DIAGNOSIS — R73.09 HIGH GLUCOSE LEVEL: ICD-10-CM

## 2024-09-03 DIAGNOSIS — E05.91 THYROTOXICOSIS WITH THYROTOXIC CRISIS, UNSPECIFIED THYROTOXICOSIS TYPE: ICD-10-CM

## 2024-09-03 DIAGNOSIS — E05.00 GRAVES DISEASE: Primary | ICD-10-CM

## 2024-09-03 DIAGNOSIS — I42.9 CARDIOMYOPATHY, UNSPECIFIED TYPE (HCC): ICD-10-CM

## 2024-09-03 DIAGNOSIS — E05.00 GRAVES' ORBITOPATHY: ICD-10-CM

## 2024-09-03 DIAGNOSIS — I48.91 ATRIAL FIBRILLATION WITH RAPID VENTRICULAR RESPONSE (HCC): ICD-10-CM

## 2024-09-03 DIAGNOSIS — I50.9 ACUTE HEART FAILURE, UNSPECIFIED HEART FAILURE TYPE (HCC): ICD-10-CM

## 2024-09-03 DIAGNOSIS — E05.00 GRAVES DISEASE: ICD-10-CM

## 2024-09-03 LAB
T3FREE SERPL-MCNC: 1.75 PG/ML (ref 2.4–4.2)
T4 FREE SERPL-MCNC: 0.6 NG/DL (ref 0.8–1.7)
TSI SER-ACNC: 0.04 MIU/ML (ref 0.55–4.78)

## 2024-09-03 PROCEDURE — 84443 ASSAY THYROID STIM HORMONE: CPT

## 2024-09-03 PROCEDURE — 84439 ASSAY OF FREE THYROXINE: CPT

## 2024-09-03 PROCEDURE — 36415 COLL VENOUS BLD VENIPUNCTURE: CPT

## 2024-09-03 PROCEDURE — 99214 OFFICE O/P EST MOD 30 MIN: CPT | Performed by: INTERNAL MEDICINE

## 2024-09-03 PROCEDURE — 84481 FREE ASSAY (FT-3): CPT

## 2024-09-03 RX ORDER — PROPRANOLOL HYDROCHLORIDE 10 MG/1
10 TABLET ORAL 2 TIMES DAILY
Qty: 90 TABLET | Refills: 1 | Status: SHIPPED | OUTPATIENT
Start: 2024-09-03

## 2024-09-03 RX ORDER — METHIMAZOLE 10 MG/1
20 TABLET ORAL DAILY
Qty: 30 TABLET | Refills: 1 | Status: SHIPPED | OUTPATIENT
Start: 2024-09-03

## 2024-09-03 NOTE — PATIENT INSTRUCTIONS
Labs today   Labs and follow up in 1 mo   Methimazole 20 mg daily and will reassess after labs today might decrease the dose   Propranolol 10 mg x2/day and will taper down slowly.   Follow up with cardiology   Will refer to Dr Kemp   Will refer to ophtha for graves eye disease   Use eye drops up to 4 times a day     Discussed with patient possible dx, treatment options, SALGADO vs surgery vs meds. Discussed thyroid and pregnancy (if applicable), wt gain, eye disease, and SE of meds and SALGADO. Methimazole may cause significant bone marrow depression; the most severe manifestation is agranulocytosis. May also cause Hepatotoxicity (including acute liver failure) Symptoms suggestive of hepatic dysfunction (eg, anorexia, pruritus, right upper quadrant pain) should prompt evaluation. If you have nausea, vomiting, abdominal pain, jaundice- yellow skin or eye color-, dark urine, light stool color, fever, sore throat or infection, call us or the PCP.  Remission rate of ~ 40% with use of antithyroid drugs for 1-1.5 years, their side effects, monitoring, and follow-up issues, specifically agranulocytosis, liver toxicity, anaphylaxis, rash, lupus like syndrome, metallic taste in the mouth, and joint aches. We discussed that patient should discontinue methimazole at the earliest sign of a fever, sore throat, or other infection, should call our office and have WBC count with differential done. The drug should be held until the result is available.     If applicable, Hyperthyroid pregnancy counseling. General counseling on contraception (Z30.09).  We discussed in details the adverse effect of uncontrolled hyperthyroidism on pregnancy    Also discussed the risk of Methimazole on the fetus.   Please notify us if you are pregnant or you are planning to get pregnant.   Print out was given to the Pt

## 2024-09-03 NOTE — PROGRESS NOTES
New Patient Evaluation - History and Physical    CONSULT - Reason for Visit:    hyperthyroid with complications. Graves orbitopathy   Requesting Physician:   ..No primary care provider on file.    CHIEF COMPLAINT:    Chief Complaint   Patient presents with    Thyroid Problem     Patient has lots of swelling in lower extremities .  Had thyroid labs 5/29/24        HISTORY OF PRESENT ILLNESS:   Shubham Zurita is a 54 year old female who presents with  Graves', Graves orbitopathy. Severe hyperthyroid with A fib, HF  hyperthyroid with complications.  7/30/2024 high TSI, High  TRAb   Strong family hx of thyroid autoimmune disease         Methimazole 20 mg   every day   Propranolol  Rx 30 mg tid stopped 8/15/2024 after she left the hospital. BP was low saw cardiology and dose was decreased to 5 mg tid     Does not have PCP   Seeing Cardiology Dr Kellogg.      Has goiter but denied compression symptoms: denied except the underlined ones SOB, dysphagia, odynophagia, change in voice, hoarseness, neck pain or neck mass.     Denied hyperthyroid sx     Hair and skin: lost hair   Menstrual hx: post mp  Neck surgery: No  Neck radiation: No   Biotin: no  Turmeric:no  Family history of thyroid cancer in 1st degree relatives: no         Lab Results   Component Value Date    A1C 5.2 08/04/2024            ASSESSMENT AND PLAN:  Shubham Zurita is a 54 year old female who presents with    Labs today   Labs and follow up in 1 mo   Methimazole 20 mg daily and will reassess after labs today might decrease the dose   Propranolol 5 mg x3/day and will taper down slowly.   Follow up with cardiology   Will refer to Dr Kemp   Will refer to ophtha for graves eye disease   Use eye drops up to 4 times a day     Discussed with patient possible dx, treatment options, SALGADO vs surgery vs meds. Discussed thyroid and pregnancy (if applicable), wt gain, eye disease, and SE of meds and SALGADO. Methimazole may cause significant bone marrow depression; the most severe  manifestation is agranulocytosis. May also cause Hepatotoxicity (including acute liver failure) Symptoms suggestive of hepatic dysfunction (eg, anorexia, pruritus, right upper quadrant pain) should prompt evaluation. If you have nausea, vomiting, abdominal pain, jaundice- yellow skin or eye color-, dark urine, light stool color, fever, sore throat or infection, call us or the PCP.  Remission rate of ~ 40% with use of antithyroid drugs for 1-1.5 years, their side effects, monitoring, and follow-up issues, specifically agranulocytosis, liver toxicity, anaphylaxis, rash, lupus like syndrome, metallic taste in the mouth, and joint aches. We discussed that patient should discontinue methimazole at the earliest sign of a fever, sore throat, or other infection, should call our office and have WBC count with differential done. The drug should be held until the result is available.     If applicable, Hyperthyroid pregnancy counseling. General counseling on contraception (Z30.09).  We discussed in details the adverse effect of uncontrolled hyperthyroidism on pregnancy    Also discussed the risk of Methimazole on the fetus.   Please notify us if you are pregnant or you are planning to get pregnant.   Print out was given to the Pt      PAST MEDICAL HISTORY:   History reviewed. No pertinent past medical history.  Graves  Afib  Graves orbitopathy   Hyperthyroid    PAST SURGICAL HISTORY:   History reviewed. No pertinent surgical history.    CURRENT MEDICATIONS:     methIMAzole 10 MG Oral Tab Take 2 tablets (20 mg total) by mouth daily. 30 tablet 1    propranolol 10 MG Oral Tab Take 1 tablet (10 mg total) by mouth 2 (two) times daily. 90 tablet 1    furosemide 40 MG Oral Tab Take 1 tablet (40 mg total) by mouth daily. 30 tablet 3    flecainide 100 MG Oral Tab Take 1 tablet (100 mg total) by mouth every 12 (twelve) hours. 60 tablet 3    midodrine 10 MG Oral Tab Take 1 tablet (10 mg total) by mouth in the morning and 1 tablet (10 mg  total) at noon and 1 tablet (10 mg total) in the evening. 90 tablet 3    ferrous sulfate 325 (65 FE) MG Oral Tab EC Take 1 tablet (325 mg total) by mouth 2 (two) times daily with meals. 60 tablet 0    apixaban 5 MG Oral Tab Take 1 tablet (5 mg total) by mouth 2 (two) times daily. 60 tablet 3       ALLERGIES:  No Known Allergies    SOCIAL HISTORY:    Social History     Socioeconomic History    Marital status:    Tobacco Use    Smoking status: Never    Smokeless tobacco: Never   Vaping Use    Vaping status: Never Used   Substance and Sexual Activity    Alcohol use: Never    Drug use: Never        FAMILY HISTORY:   History reviewed. No pertinent family history.    Sister with hyperthyroid  Breast ca in mother     PHYSICAL EXAM:   Height: 5' 4\" (162.6 cm) (09/03 1328)  Weight: 179 lb 12.8 oz (81.6 kg) (09/03 1328)  BSA (Calculated - sq m): 1.87 sq meters (09/03 1328)  Pulse: 67 (09/03 1328)  BP: 138/60 (09/03 1328)  Temp: --  Do Not Use - Resp Rate: --  SpO2: 99 % (09/03 1328)    Body mass index is 30.86 kg/m².  No lid lag or lid retraction   Has proptosis  conjunctiva congested   + goiter   Leg swelling       DATA:     Pertinent data reviewed      08/13/24 09:21   CARD NUC STRESS TEST LEXISCAN (CPT 49403/11812/) Rpt      Latest Reference Range & Units 07/29/24 17:31   HCG, Serum Qualitative (S) Negative  Negative      Latest Reference Range & Units 08/02/24 06:41   T4,Free (Direct) 0.8 - 1.7 ng/dL 1.3      Latest Reference Range & Units 07/30/24 09:29 07/30/24 09:35   Thy Stim Immuno 0.00 - 0.55 IU/L  1.85 (H)   Thyrotropin Rec Ab 0.00 - 1.75 IU/L 6.68 (H)    (H): Data is abnormally high     No results for input(s): \"TSH\", \"T4F\", \"T3F\", \"THYP\" in the last 72 hours.  No results found.    Orders Placed This Encounter   Procedures    TSH W Reflex To Free T4    Free T4, (Free Thyroxine)     Orders Placed This Encounter    TSH W Reflex To Free T4     Standing Status:   Future     Standing Expiration Date:    9/3/2025     Order Specific Question:   Release to patient     Answer:   Immediate    Free T4, (Free Thyroxine)     Standing Status:   Future     Standing Expiration Date:   9/3/2025    methIMAzole 10 MG Oral Tab     Sig: Take 2 tablets (20 mg total) by mouth daily.     Dispense:  30 tablet     Refill:  1    propranolol 10 MG Oral Tab     Sig: Take 1 tablet (10 mg total) by mouth 2 (two) times daily.     Dispense:  90 tablet     Refill:  1          This is a specialized patient consultation in endocrinology and required comprehensive review of prior records, as well as current evaluation, with time required for consideration of complex endocrine issues and consultation. For this visit, I personally interviewed the patient, and family member if accompanied, performed the pertinent parts of the history and physical examination. ROS included screening for appropriate endocrine conditions.   Today's diagnosis and plan were reviewed in detail with the patient who states understanding and agrees with plan. I discussed with the patient possible diagnosis, differential diagnosis, need for work up, treatment options, alternatives and side effects.     Please see note for details about time spent which includes:   · pre-visit preparation  · reviewing records  · face to face time with the patient   · timely documentation of the encounter  · ordering medications/tests  · communication with care team  · care coordination    I appreciate the opportunity to be part of your patient's medical care and will keep you, as the referring and primary physicians, informed about the care of your patient. Please feel free to contact me should you have any questions.    The 21st Century Cures Act makes medical notes like these available to patients in the interest of transparency. Please be advised this is a medical document. Medical documents are intended to carry relevant information, facts as evident, and the clinical opinion of the  practitioner. The medical note is intended as peer to peer communication and may appear blunt or direct. It is written in medical language and may contain abbreviations or verbiage that are unfamiliar.   Scott Black MD

## 2024-09-04 ENCOUNTER — TELEPHONE (OUTPATIENT)
Dept: ENDOCRINOLOGY CLINIC | Facility: CLINIC | Age: 54
End: 2024-09-04

## 2024-09-04 NOTE — TELEPHONE ENCOUNTER
Please call the pt   Decrease methimazole to 10 mg once a day     Will discuss more next visit   Thanks

## 2024-09-09 ENCOUNTER — PATIENT MESSAGE (OUTPATIENT)
Facility: LOCATION | Age: 54
End: 2024-09-09

## 2024-09-09 NOTE — TELEPHONE ENCOUNTER
Called patient and denies any cardiac symptoms.  RN reviewed instructions below. RN advised patient to call if BP is >140/90 or HR >100. Patient verbalizes understanding

## 2024-09-09 NOTE — TELEPHONE ENCOUNTER
From: Shubham Zurita  To: Scott Black  Sent: 9/9/2024 5:56 AM CDT  Subject: Pulse    Good Morning,    September 4th I start take [id number] 2 times daily....  My pulse was around 67 now it drop to 42 to 45 what should I do......  Thanks   Shubham Zurita

## 2024-09-09 NOTE — TELEPHONE ENCOUNTER
Dr. Black, please see message below. I did send patient  a message to see if she is experiencing any other symptoms. Thank you.

## 2024-09-12 ENCOUNTER — OFFICE VISIT (OUTPATIENT)
Facility: LOCATION | Age: 54
End: 2024-09-12
Payer: MEDICAID

## 2024-09-12 ENCOUNTER — LAB ENCOUNTER (OUTPATIENT)
Dept: LAB | Age: 54
End: 2024-09-12
Attending: INTERNAL MEDICINE
Payer: MEDICAID

## 2024-09-12 VITALS
HEIGHT: 64 IN | OXYGEN SATURATION: 99 % | BODY MASS INDEX: 31.48 KG/M2 | SYSTOLIC BLOOD PRESSURE: 131 MMHG | DIASTOLIC BLOOD PRESSURE: 67 MMHG | WEIGHT: 184.38 LBS | HEART RATE: 56 BPM

## 2024-09-12 DIAGNOSIS — I42.9 CARDIOMYOPATHY, UNSPECIFIED TYPE (HCC): ICD-10-CM

## 2024-09-12 DIAGNOSIS — Z12.31 ENCOUNTER FOR SCREENING MAMMOGRAM FOR BREAST CANCER: ICD-10-CM

## 2024-09-12 DIAGNOSIS — I89.0 LYMPHEDEMA: ICD-10-CM

## 2024-09-12 DIAGNOSIS — D50.9 IRON DEFICIENCY ANEMIA, UNSPECIFIED IRON DEFICIENCY ANEMIA TYPE: ICD-10-CM

## 2024-09-12 DIAGNOSIS — E05.00 GRAVES DISEASE: ICD-10-CM

## 2024-09-12 DIAGNOSIS — Z13.0 SCREENING FOR ENDOCRINE, NUTRITIONAL, METABOLIC AND IMMUNITY DISORDER: ICD-10-CM

## 2024-09-12 DIAGNOSIS — Z12.11 COLON CANCER SCREENING: ICD-10-CM

## 2024-09-12 DIAGNOSIS — Z13.228 SCREENING FOR ENDOCRINE, NUTRITIONAL, METABOLIC AND IMMUNITY DISORDER: ICD-10-CM

## 2024-09-12 DIAGNOSIS — E05.00 GRAVES' ORBITOPATHY: ICD-10-CM

## 2024-09-12 DIAGNOSIS — Z13.29 SCREENING FOR ENDOCRINE, NUTRITIONAL, METABOLIC AND IMMUNITY DISORDER: ICD-10-CM

## 2024-09-12 DIAGNOSIS — Z13.21 SCREENING FOR ENDOCRINE, NUTRITIONAL, METABOLIC AND IMMUNITY DISORDER: ICD-10-CM

## 2024-09-12 DIAGNOSIS — E55.9 VITAMIN D DEFICIENCY: ICD-10-CM

## 2024-09-12 DIAGNOSIS — E05.91 THYROTOXICOSIS WITH THYROTOXIC CRISIS, UNSPECIFIED THYROTOXICOSIS TYPE: ICD-10-CM

## 2024-09-12 DIAGNOSIS — Z00.00 ANNUAL PHYSICAL EXAM: Primary | ICD-10-CM

## 2024-09-12 DIAGNOSIS — Z23 NEED FOR VACCINATION: ICD-10-CM

## 2024-09-12 DIAGNOSIS — I48.19 ATRIAL FIBRILLATION, PERSISTENT (HCC): ICD-10-CM

## 2024-09-12 DIAGNOSIS — I50.83 HIGH OUTPUT CONGESTIVE HEART FAILURE (HCC): ICD-10-CM

## 2024-09-12 LAB
BASOPHILS # BLD AUTO: 0.06 X10(3) UL (ref 0–0.2)
BASOPHILS NFR BLD AUTO: 1.5 %
DEPRECATED HBV CORE AB SER IA-ACNC: 31.9 NG/ML
DEPRECATED RDW RBC AUTO: 57.9 FL (ref 35.1–46.3)
EOSINOPHIL # BLD AUTO: 0.16 X10(3) UL (ref 0–0.7)
EOSINOPHIL NFR BLD AUTO: 4.1 %
ERYTHROCYTE [DISTWIDTH] IN BLOOD BY AUTOMATED COUNT: 21.7 % (ref 11–15)
EST. AVERAGE GLUCOSE BLD GHB EST-MCNC: 103 MG/DL (ref 68–126)
HBA1C MFR BLD: 5.2 % (ref ?–5.7)
HCT VFR BLD AUTO: 29.3 %
HGB BLD-MCNC: 8.8 G/DL
IMM GRANULOCYTES # BLD AUTO: 0.01 X10(3) UL (ref 0–1)
IMM GRANULOCYTES NFR BLD: 0.3 %
IRON SATN MFR SERPL: 17 %
IRON SERPL-MCNC: 85 UG/DL
LYMPHOCYTES # BLD AUTO: 1.41 X10(3) UL (ref 1–4)
LYMPHOCYTES NFR BLD AUTO: 36.2 %
MCH RBC QN AUTO: 22.3 PG (ref 26–34)
MCHC RBC AUTO-ENTMCNC: 30 G/DL (ref 31–37)
MCV RBC AUTO: 74.2 FL
MONOCYTES # BLD AUTO: 0.31 X10(3) UL (ref 0.1–1)
MONOCYTES NFR BLD AUTO: 7.9 %
NEUTROPHILS # BLD AUTO: 1.95 X10 (3) UL (ref 1.5–7.7)
NEUTROPHILS # BLD AUTO: 1.95 X10(3) UL (ref 1.5–7.7)
NEUTROPHILS NFR BLD AUTO: 50 %
PLATELET # BLD AUTO: 273 10(3)UL (ref 150–450)
RBC # BLD AUTO: 3.95 X10(6)UL
TIBC SERPL-MCNC: 492 UG/DL (ref 250–425)
TRANSFERRIN SERPL-MCNC: 330 MG/DL (ref 250–380)
VIT D+METAB SERPL-MCNC: 35.7 NG/ML (ref 30–100)
WBC # BLD AUTO: 3.9 X10(3) UL (ref 4–11)

## 2024-09-12 PROCEDURE — 99386 PREV VISIT NEW AGE 40-64: CPT | Performed by: INTERNAL MEDICINE

## 2024-09-12 PROCEDURE — 80053 COMPREHEN METABOLIC PANEL: CPT | Performed by: INTERNAL MEDICINE

## 2024-09-12 PROCEDURE — 84466 ASSAY OF TRANSFERRIN: CPT

## 2024-09-12 PROCEDURE — 83036 HEMOGLOBIN GLYCOSYLATED A1C: CPT | Performed by: INTERNAL MEDICINE

## 2024-09-12 PROCEDURE — 36415 COLL VENOUS BLD VENIPUNCTURE: CPT

## 2024-09-12 PROCEDURE — 82306 VITAMIN D 25 HYDROXY: CPT | Performed by: INTERNAL MEDICINE

## 2024-09-12 PROCEDURE — 90471 IMMUNIZATION ADMIN: CPT | Performed by: INTERNAL MEDICINE

## 2024-09-12 PROCEDURE — 90677 PCV20 VACCINE IM: CPT | Performed by: INTERNAL MEDICINE

## 2024-09-12 PROCEDURE — 83540 ASSAY OF IRON: CPT

## 2024-09-12 PROCEDURE — 80061 LIPID PANEL: CPT | Performed by: INTERNAL MEDICINE

## 2024-09-12 PROCEDURE — 85025 COMPLETE CBC W/AUTO DIFF WBC: CPT

## 2024-09-12 PROCEDURE — 82728 ASSAY OF FERRITIN: CPT

## 2024-09-12 PROCEDURE — 82607 VITAMIN B-12: CPT

## 2024-09-12 PROCEDURE — 99203 OFFICE O/P NEW LOW 30 MIN: CPT | Performed by: INTERNAL MEDICINE

## 2024-09-12 NOTE — PROGRESS NOTES
INTERNAL MEDICINE ANNUAL EXAM NOTE     Patient ID: Shubham Zurita is a 54 year old female.  Chief Complaint: Physical      Shubham Zurita is a pleasant 54 year old female who presents for annual physical exam. Shubham Zurita is doing well today.  She has Graves' disease complicated by atrial fibrillation and acute heart failure, her EF has improved based upon last echo, she follows with cardiology, notes reviewed and appreciated.  She follows with endocrine for the Graves', notes reviewed and appreciated.  Her propranolol midodrine furosemide and flecainide are being adjusted by the 2 specialist, unfortunately when she comes off propranolol blood pressure comes too high.       Health Maintenance  - All care gaps addressed with patient.     Review of Systems  Review of Systems   Constitutional:  Negative for unexpected weight change.   HENT:  Negative for hearing loss.    Eyes:  Negative for pain and visual disturbance.   Respiratory:  Negative for shortness of breath.    Cardiovascular:  Negative for chest pain, palpitations and leg swelling.   Gastrointestinal:  Negative for abdominal pain and blood in stool.   Genitourinary:  Negative for difficulty urinating and hematuria.   Neurological:  Negative for tremors and syncope.   Psychiatric/Behavioral: Negative.         Physical Exam  Vitals:    09/12/24 1553   BP: 131/67   Pulse: 56   SpO2: 99%   Weight: 184 lb 6.4 oz (83.6 kg)   Height: 5' 4\" (1.626 m)     Body mass index is 31.65 kg/m².  BP Readings from Last 3 Encounters:   09/12/24 131/67   09/03/24 138/60   08/13/24 96/64     Physical Exam  Vitals and nursing note reviewed.   Constitutional:       General: She is not in acute distress.     Appearance: Normal appearance.   HENT:      Head: Normocephalic.      Right Ear: External ear normal.      Left Ear: External ear normal.   Eyes:      Extraocular Movements: Extraocular movements intact.      Conjunctiva/sclera: Conjunctivae normal.   Cardiovascular:       Rate and Rhythm: Normal rate. Rhythm irregular.      Pulses: Normal pulses.      Heart sounds: Normal heart sounds.   Pulmonary:      Effort: Pulmonary effort is normal. No respiratory distress.      Breath sounds: Normal breath sounds. No wheezing.   Abdominal:      General: Abdomen is flat. Bowel sounds are normal.      Tenderness: There is no abdominal tenderness.   Musculoskeletal:         General: Normal range of motion.      Cervical back: Normal range of motion and neck supple.   Skin:     Coloration: Skin is not jaundiced.   Neurological:      General: No focal deficit present.      Mental Status: She is alert and oriented to person, place, and time. Mental status is at baseline.   Psychiatric:         Mood and Affect: Mood normal.         Behavior: Behavior normal.           Labs & Imaging  Pertinent labs and imaging reviewed.   Lab Results   Component Value Date    GLU 79 09/12/2024    BUN 16 09/12/2024    BUNCREA 16.8 09/12/2024    CREATSERUM 0.95 09/12/2024    ANIONGAP 5 09/12/2024    CA 9.0 09/12/2024    OSMOCALC 288 09/12/2024    ALKPHO 95 09/12/2024    AST 19 09/12/2024    ALT <7 (L) 09/12/2024    BILT 0.8 09/12/2024    TP 8.9 (H) 09/12/2024    ALB 3.7 09/12/2024    GLOBULIN 5.2 (H) 09/12/2024     09/12/2024    K 3.7 09/12/2024     09/12/2024    CO2 26.0 09/12/2024     Lab Results   Component Value Date     09/12/2024    A1C 5.2 09/12/2024     Lab Results   Component Value Date    WBC 3.9 (L) 09/12/2024    RBC 3.95 09/12/2024    HGB 8.8 (L) 09/12/2024    HCT 29.3 (L) 09/12/2024    MCV 74.2 (L) 09/12/2024    MCH 22.3 (L) 09/12/2024    MCHC 30.0 (L) 09/12/2024    RDW 21.7 (H) 09/12/2024    .0 09/12/2024     Lab Results   Component Value Date    CHOLEST 131 09/12/2024    TRIG 66 09/12/2024    HDL 46 09/12/2024    LDL 71 09/12/2024    VLDL 10 09/12/2024    NONHDLC 85 09/12/2024     The 10-year ASCVD risk score (Leonid LANE, et al., 2019) is: 1.6%    Values used to calculate the  score:      Age: 54 years      Sex: Female      Is Non- : No      Diabetic: No      Tobacco smoker: No      Systolic Blood Pressure: 131 mmHg      Is BP treated: No      HDL Cholesterol: 46 mg/dL      Total Cholesterol: 131 mg/dL    Medical History    Reviewed allergies:  No Known Allergies     Reviewed:  Patient Active Problem List    Diagnosis    Lymphedema    Iron deficiency anemia    Graves disease    Graves' orbitopathy    Atrial fibrillation, persistent (HCC)    Thyrotoxicosis with thyrotoxic crisis, unspecified thyrotoxicosis type    High output congestive heart failure (HCC)    Cardiomyopathy, unspecified type (HCC)      Reviewed:  History reviewed. No pertinent past medical history.   Reviewed:  History reviewed. No pertinent family history.    Reviewed:  History reviewed. No pertinent surgical history.   Reviewed:  Social History     Socioeconomic History    Marital status:    Tobacco Use    Smoking status: Never    Smokeless tobacco: Never   Vaping Use    Vaping status: Never Used   Substance and Sexual Activity    Alcohol use: Never    Drug use: Never     Social Determinants of Health     Food Insecurity: No Food Insecurity (7/30/2024)    Food Insecurity     Food Insecurity: Never true   Transportation Needs: No Transportation Needs (7/30/2024)    Transportation Needs     Lack of Transportation: No   Housing Stability: Low Risk  (7/30/2024)    Housing Stability     Housing Instability: No      Reviewed:  Current Outpatient Medications   Medication Sig Dispense Refill    methIMAzole 10 MG Oral Tab Take 2 tablets (20 mg total) by mouth daily. 30 tablet 1    propranolol 10 MG Oral Tab Take 1 tablet (10 mg total) by mouth 2 (two) times daily. 90 tablet 1    furosemide 40 MG Oral Tab Take 1 tablet (40 mg total) by mouth daily. 30 tablet 3    flecainide 100 MG Oral Tab Take 1 tablet (100 mg total) by mouth every 12 (twelve) hours. 60 tablet 3    midodrine 10 MG Oral Tab Take 1  tablet (10 mg total) by mouth in the morning and 1 tablet (10 mg total) at noon and 1 tablet (10 mg total) in the evening. 90 tablet 3    ferrous sulfate 325 (65 FE) MG Oral Tab EC Take 1 tablet (325 mg total) by mouth 2 (two) times daily with meals. 60 tablet 0    apixaban 5 MG Oral Tab Take 1 tablet (5 mg total) by mouth 2 (two) times daily. 60 tablet 3          Assessment & Plan    1. Annual physical exam  - Comp Metabolic Panel (14)  - Hemoglobin A1C  - Lipid Panel    2. Screening for endocrine, nutritional, metabolic and immunity disorder  - Comp Metabolic Panel (14)  - Hemoglobin A1C  - Lipid Panel  - Vitamin D    3. Vitamin D deficiency  - Vitamin D    4. Colon cancer screening  - [facility] GI Telephone Colon Screen  - [facility] - INTERNAL    5. Encounter for screening mammogram for breast cancer  - [facility] DORYS 2D+3D SCREENING BILAT (CPT=77067/38190); Future    6. Iron deficiency anemia, unspecified iron deficiency anemia type  - Ferritin; Future  - Iron And Tibc; Future  - Vitamin B12 with Reflex to MMA; Future  - CBC With Differential With Platelet; Future    7. Need for vaccination  - PCV20 (Prevnar 20)  - TdaP (Boostrix) Vaccine (> 7 Y)  - Shingrix 1st Dose (Today)  - Shingrix Vaccine 2nd Dose (Future); Future    8. Lymphedema    9. Cardiomyopathy, unspecified type (HCC)    10. Atrial fibrillation, persistent (HCC)    11. High output congestive heart failure (HCC)    12. Thyrotoxicosis with thyrotoxic crisis, unspecified thyrotoxicosis type    13. Graves disease    14. Graves' orbitopathy  Plan  Overall doing well today. Screening labs, preventive imaging/procedure, and health care gaps addressed as per USPSTF guidelines, orders available electronically via [facility] and in [facility].  Vaccines discussed and administered depending on availability and per patient preference; patient to return for any outstanding vaccines once available.  Patient brought to  to help schedule care gaps and follow up. Further  recommendations depending on lab results.  Cardiac and thyroid conditions are being managed by endocrine and cardio, appreciate recs.         Follow Up:   Return for AS NEEDED/ IF SYMPTOMS WORSEN, DEPENDING ON RESULTS.      Richy Kemp MD  Internal Medicine      Patient asked to sign release of information for outside records if not already requested, make future office/imaging appointments at the  prior to leaving, and to sign up for [facility] if not already active.  Preventive measures and further education discussed with patient as per after visit summary. Potential medication side effects discussed. All questions answered to best of ability.   Call office with any questions. Seek emergency care if necessary.   Patient understands and agrees to follow directions and advice.      ----------------------------------------- PATIENT INSTRUCTIONS-----------------------------------------     There are no Patient Instructions on file for this visit.

## 2024-09-13 PROBLEM — I50.9 ACUTE HEART FAILURE (HCC): Status: RESOLVED | Noted: 2024-07-29 | Resolved: 2024-09-13

## 2024-09-13 PROBLEM — I89.0 LYMPHEDEMA: Status: ACTIVE | Noted: 2024-09-13

## 2024-09-13 PROBLEM — I50.83 HIGH OUTPUT CONGESTIVE HEART FAILURE (HCC): Status: ACTIVE | Noted: 2024-07-29

## 2024-09-13 PROBLEM — D64.9 ANEMIA, UNSPECIFIED TYPE: Status: RESOLVED | Noted: 2024-07-29 | Resolved: 2024-09-13

## 2024-09-13 PROBLEM — I48.19 ATRIAL FIBRILLATION, PERSISTENT (HCC): Status: ACTIVE | Noted: 2024-07-29

## 2024-09-13 PROBLEM — D50.9 IRON DEFICIENCY ANEMIA: Status: ACTIVE | Noted: 2024-09-13

## 2024-09-13 LAB
ALBUMIN SERPL-MCNC: 3.7 G/DL (ref 3.2–4.8)
ALBUMIN/GLOB SERPL: 0.7 {RATIO} (ref 1–2)
ALP LIVER SERPL-CCNC: 95 U/L
ALT SERPL-CCNC: <7 U/L
ANION GAP SERPL CALC-SCNC: 5 MMOL/L (ref 0–18)
AST SERPL-CCNC: 19 U/L (ref ?–34)
BILIRUB SERPL-MCNC: 0.8 MG/DL (ref 0.3–1.2)
BUN BLD-MCNC: 16 MG/DL (ref 9–23)
BUN/CREAT SERPL: 16.8 (ref 10–20)
CALCIUM BLD-MCNC: 9 MG/DL (ref 8.7–10.4)
CHLORIDE SERPL-SCNC: 108 MMOL/L (ref 98–112)
CHOLEST SERPL-MCNC: 131 MG/DL (ref ?–200)
CO2 SERPL-SCNC: 26 MMOL/L (ref 21–32)
CREAT BLD-MCNC: 0.95 MG/DL
EGFRCR SERPLBLD CKD-EPI 2021: 71 ML/MIN/1.73M2 (ref 60–?)
FASTING PATIENT LIPID ANSWER: NO
FASTING STATUS PATIENT QL REPORTED: NO
GLOBULIN PLAS-MCNC: 5.2 G/DL (ref 2–3.5)
GLUCOSE BLD-MCNC: 79 MG/DL (ref 70–99)
HDLC SERPL-MCNC: 46 MG/DL (ref 40–59)
LDLC SERPL CALC-MCNC: 71 MG/DL (ref ?–100)
NONHDLC SERPL-MCNC: 85 MG/DL (ref ?–130)
OSMOLALITY SERPL CALC.SUM OF ELEC: 288 MOSM/KG (ref 275–295)
POTASSIUM SERPL-SCNC: 3.7 MMOL/L (ref 3.5–5.1)
PROT SERPL-MCNC: 8.9 G/DL (ref 5.7–8.2)
SODIUM SERPL-SCNC: 139 MMOL/L (ref 136–145)
TRIGL SERPL-MCNC: 66 MG/DL (ref 30–149)
VIT B12 SERPL-MCNC: 425 PG/ML (ref 211–911)
VLDLC SERPL CALC-MCNC: 10 MG/DL (ref 0–30)

## 2024-09-30 ENCOUNTER — TELEPHONE (OUTPATIENT)
Dept: ENDOCRINOLOGY CLINIC | Facility: CLINIC | Age: 54
End: 2024-09-30

## 2024-09-30 DIAGNOSIS — E05.00 GRAVES' ORBITOPATHY: Primary | ICD-10-CM

## 2024-09-30 NOTE — TELEPHONE ENCOUNTER
Patient requesting referral to see Dr Micah Kemp, Ophthalmologist.  Please fax referral to [phone] (ph# [phone]).  For additional questions please call.  Thank you.

## 2024-10-02 NOTE — TELEPHONE ENCOUNTER
Dr. Black, Patient is requesting ophthalmology referral to Dr. Micah Kemp. Referral is pended, please review and sign. Thank you.

## 2024-10-17 RX ORDER — FERROUS SULFATE 325(65) MG
325 TABLET, DELAYED RELEASE (ENTERIC COATED) ORAL 2 TIMES DAILY WITH MEALS
Qty: 180 TABLET | Refills: 3 | Status: SHIPPED | OUTPATIENT
Start: 2024-10-17

## 2024-10-17 NOTE — TELEPHONE ENCOUNTER
Refill routed to Endo in error, will route to PCP.     Dr. Kemp,     Pt requesting refill, please advise. Thank you!

## 2024-10-31 ENCOUNTER — LAB ENCOUNTER (OUTPATIENT)
Dept: LAB | Age: 54
End: 2024-10-31
Attending: INTERNAL MEDICINE
Payer: MEDICAID

## 2024-10-31 ENCOUNTER — TELEPHONE (OUTPATIENT)
Dept: ENDOCRINOLOGY CLINIC | Facility: CLINIC | Age: 54
End: 2024-10-31

## 2024-10-31 ENCOUNTER — OFFICE VISIT (OUTPATIENT)
Facility: LOCATION | Age: 54
End: 2024-10-31
Payer: MEDICAID

## 2024-10-31 VITALS
SYSTOLIC BLOOD PRESSURE: 100 MMHG | HEIGHT: 64 IN | WEIGHT: 133 LBS | HEART RATE: 51 BPM | DIASTOLIC BLOOD PRESSURE: 70 MMHG | BODY MASS INDEX: 22.71 KG/M2

## 2024-10-31 DIAGNOSIS — E05.90 HYPERTHYROIDISM: ICD-10-CM

## 2024-10-31 DIAGNOSIS — E05.91 THYROTOXICOSIS WITH THYROTOXIC CRISIS, UNSPECIFIED THYROTOXICOSIS TYPE: ICD-10-CM

## 2024-10-31 DIAGNOSIS — E05.00 GRAVES' ORBITOPATHY: ICD-10-CM

## 2024-10-31 DIAGNOSIS — R73.09 HIGH GLUCOSE LEVEL: ICD-10-CM

## 2024-10-31 DIAGNOSIS — M79.89 LEG SWELLING: ICD-10-CM

## 2024-10-31 DIAGNOSIS — E05.00 GRAVES DISEASE: Primary | ICD-10-CM

## 2024-10-31 DIAGNOSIS — I42.9 CARDIOMYOPATHY, UNSPECIFIED TYPE (HCC): ICD-10-CM

## 2024-10-31 DIAGNOSIS — E05.00 GRAVES DISEASE: ICD-10-CM

## 2024-10-31 DIAGNOSIS — I48.91 ATRIAL FIBRILLATION WITH RAPID VENTRICULAR RESPONSE (HCC): ICD-10-CM

## 2024-10-31 DIAGNOSIS — I50.9 ACUTE HEART FAILURE, UNSPECIFIED HEART FAILURE TYPE (HCC): ICD-10-CM

## 2024-10-31 DIAGNOSIS — I50.9 ACUTE CONGESTIVE HEART FAILURE, UNSPECIFIED HEART FAILURE TYPE (HCC): ICD-10-CM

## 2024-10-31 LAB
ANION GAP SERPL CALC-SCNC: 5 MMOL/L (ref 0–18)
BUN BLD-MCNC: 15 MG/DL (ref 9–23)
BUN/CREAT SERPL: 15.6 (ref 10–20)
CALCIUM BLD-MCNC: 10 MG/DL (ref 8.7–10.4)
CHLORIDE SERPL-SCNC: 105 MMOL/L (ref 98–112)
CO2 SERPL-SCNC: 29 MMOL/L (ref 21–32)
CREAT BLD-MCNC: 0.96 MG/DL
EGFRCR SERPLBLD CKD-EPI 2021: 70 ML/MIN/1.73M2 (ref 60–?)
FASTING STATUS PATIENT QL REPORTED: YES
GLUCOSE BLD-MCNC: 85 MG/DL (ref 70–99)
OSMOLALITY SERPL CALC.SUM OF ELEC: 288 MOSM/KG (ref 275–295)
POTASSIUM SERPL-SCNC: 4.6 MMOL/L (ref 3.5–5.1)
SODIUM SERPL-SCNC: 139 MMOL/L (ref 136–145)
T4 FREE SERPL-MCNC: 0.5 NG/DL (ref 0.8–1.7)
TSI SER-ACNC: 7.9 MIU/ML (ref 0.55–4.78)

## 2024-10-31 PROCEDURE — 84439 ASSAY OF FREE THYROXINE: CPT

## 2024-10-31 PROCEDURE — 84443 ASSAY THYROID STIM HORMONE: CPT

## 2024-10-31 PROCEDURE — 80048 BASIC METABOLIC PNL TOTAL CA: CPT

## 2024-10-31 PROCEDURE — 99214 OFFICE O/P EST MOD 30 MIN: CPT | Performed by: INTERNAL MEDICINE

## 2024-10-31 PROCEDURE — 36415 COLL VENOUS BLD VENIPUNCTURE: CPT

## 2024-10-31 RX ORDER — METHIMAZOLE 10 MG/1
10 TABLET ORAL DAILY
Qty: 30 TABLET | Refills: 2 | Status: SHIPPED | OUTPATIENT
Start: 2024-10-31 | End: 2024-10-31

## 2024-10-31 RX ORDER — PROPRANOLOL HYDROCHLORIDE 10 MG/1
5 TABLET ORAL 2 TIMES DAILY
Qty: 90 TABLET | Refills: 1 | Status: SHIPPED | OUTPATIENT
Start: 2024-10-31

## 2024-10-31 RX ORDER — METHIMAZOLE 10 MG/1
5 TABLET ORAL DAILY
Qty: 30 TABLET | Refills: 2 | Status: SHIPPED | OUTPATIENT
Start: 2024-10-31

## 2024-10-31 NOTE — PATIENT INSTRUCTIONS
Labs today   Labs and RTC in 1 mo      methimazole to 10 mg once a day - after labs today, might change the dose     Propranolol 5 mg x2/day  per cardio    Follow up with cardiology   Follow up with Dr Kemp   Will refer to ophtha for graves eye disease   Use eye drops up to 4 times a day     Methimazole may cause significant bone marrow depression; the most severe manifestation is agranulocytosis. May also cause Hepatotoxicity (including acute liver failure) Symptoms suggestive of hepatic dysfunction (eg, anorexia, pruritus, right upper quadrant pain) should prompt evaluation. If you have nausea, vomiting, abdominal pain, jaundice- yellow skin or eye color-, dark urine, light stool color, fever, sore throat or infection, call us or the PCP.  Remission rate of ~ 40% with use of antithyroid drugs for 1-1.5 years, their side effects, monitoring, and follow-up issues, specifically agranulocytosis, liver toxicity, anaphylaxis, rash, lupus like syndrome, metallic taste in the mouth, and joint aches. We discussed that patient should discontinue methimazole at the earliest sign of a fever, sore throat, or other infection, should call our office and have WBC count with differential done. The drug should be held until the result is available.

## 2024-10-31 NOTE — TELEPHONE ENCOUNTER
[facility] called to inform Dr. Black that the patient needs a referral with the diagnosis code on it due to the patient's insurance. Please send a new referral to the [facility].     Please call [phone] Ext [phone]    Fax # [phone]

## 2024-10-31 NOTE — PROGRESS NOTES
Reason for Visit:    hyperthyroid with complications. Graves orbitopathy   Requesting Physician: Dr Kemp   ..No primary care provider on file.    CHIEF COMPLAINT:    Chief Complaint   Patient presents with    Thyroid Problem     F/u        HISTORY OF PRESENT ILLNESS:   Shubham Zurita is a 54 year old female who presents with  Graves', Graves orbitopathy. Severe hyperthyroid with A fib, HF  hyperthyroid with complications.    7/30/2024 high TSI, High  TRAb   Strong family hx of thyroid autoimmune disease     In 8/2024, was started on Methimazole 20 mg every day and Propranolol  Rx 30 mg tid stopped 8/15/2024 after she left the hospital.      Seeing Cardiology Dr Kellogg.   9/2024  we decrease methimazole to 10 mg once a day  Propranolol 5 mg x2/day  per cardio    Goiter is smaller   No hyperthyroid sx   No tremors       Hair and skin: lost hair   Menstrual hx: post mp  Neck surgery: No  Neck radiation: No   Biotin: no  Turmeric:no  Family history of thyroid cancer in 1st degree relatives: no       ASSESSMENT AND PLAN:  Shubham Zurita is a 54 year old female who presents with  Graves', Graves orbitopathy. Severe hyperthyroid with A fib, HF  hyperthyroid with complications.   On methimazole to 10 mg once a day  and Propranolol 5 mg x2/day  per cardio    Labs today   Labs and RTC in 1 mo      methimazole to 10 mg once a day - after labs today, might change the dose     Propranolol 5 mg x2/day  per cardio    Follow up with cardiology   Follow up with Dr Kemp   Will refer to ophtha for graves eye disease   Use eye drops up to 4 times a day      Methimazole may cause significant bone marrow depression; the most severe manifestation is agranulocytosis. May also cause Hepatotoxicity (including acute liver failure) Symptoms suggestive of hepatic dysfunction (eg, anorexia, pruritus, right upper quadrant pain) should prompt evaluation. If you have nausea, vomiting, abdominal pain, jaundice- yellow skin or eye color-, dark  urine, light stool color, fever, sore throat or infection, call us or the PCP.  Remission rate of ~ 40% with use of antithyroid drugs for 1-1.5 years, their side effects, monitoring, and follow-up issues, specifically agranulocytosis, liver toxicity, anaphylaxis, rash, lupus like syndrome, metallic taste in the mouth, and joint aches. We discussed that patient should discontinue methimazole at the earliest sign of a fever, sore throat, or other infection, should call our office and have WBC count with differential done. The drug should be held until the result is available.          PAST MEDICAL HISTORY:   History reviewed. No pertinent past medical history.  Graves  Afib  Graves orbitopathy   Hyperthyroid    PAST SURGICAL HISTORY:   History reviewed. No pertinent surgical history.    CURRENT MEDICATIONS:     methIMAzole 10 MG Oral Tab Take 1 tablet (10 mg total) by mouth daily. 30 tablet 2    propranolol 10 MG Oral Tab Take 0.5 tablets (5 mg total) by mouth 2 (two) times daily. 90 tablet 1       ALLERGIES:  No Known Allergies    SOCIAL HISTORY:    Social History     Socioeconomic History    Marital status:    Tobacco Use    Smoking status: Never    Smokeless tobacco: Never   Vaping Use    Vaping status: Never Used   Substance and Sexual Activity    Alcohol use: Never    Drug use: Never        FAMILY HISTORY:   History reviewed. No pertinent family history.    Sister with hyperthyroid  Breast ca in mother     PHYSICAL EXAM:   Height: 5' 4\" (162.6 cm) (10/31 1003)  Weight: 133 lb (60.3 kg) (10/31 1003)  BSA (Calculated - sq m): 1.64 sq meters (10/31 1003)  Pulse: 51 (10/31 1003)  BP: 100/70 (10/31 1003)  Temp: --  Do Not Use - Resp Rate: --  SpO2: --    Body mass index is 22.83 kg/m².  No lid lag or lid retraction   Has proptosis  conjunctiva congested   + goiter   Leg swelling       DATA:     Pertinent data reviewed      Latest Reference Range & Units 09/03/24 14:19   T4,Free (Direct) 0.8 - 1.7 ng/dL 0.6 (L)    TSH 0.550 - 4.780 mIU/mL 0.036 (L)   T3 FREE 2.40 - 4.20 pg/mL 1.75 (L)   (L): Data is abnormally low   08/13/24 09:21   CARD NUC STRESS TEST LEXISCAN (CPT 20080/15611/) Rpt      Latest Reference Range & Units 07/29/24 17:31   HCG, Serum Qualitative (S) Negative  Negative      Latest Reference Range & Units 08/02/24 06:41   T4,Free (Direct) 0.8 - 1.7 ng/dL 1.3      Latest Reference Range & Units 07/30/24 09:29 07/30/24 09:35   Thy Stim Immuno 0.00 - 0.55 IU/L  1.85 (H)   Thyrotropin Rec Ab 0.00 - 1.75 IU/L 6.68 (H)    (H): Data is abnormally high     No results for input(s): \"TSH\", \"T4F\", \"T3F\", \"THYP\" in the last 72 hours.  No results found.    Orders Placed This Encounter   Procedures    TSH W Reflex To Free T4    TSH W Reflex To Free T4     Orders Placed This Encounter    TSH W Reflex To Free T4     Standing Status:   Future     Standing Expiration Date:   10/31/2025     Order Specific Question:   Release to patient     Answer:   Immediate    TSH W Reflex To Free T4     Standing Status:   Future     Standing Expiration Date:   10/31/2025    methIMAzole 10 MG Oral Tab     Sig: Take 1 tablet (10 mg total) by mouth daily.     Dispense:  30 tablet     Refill:  2    propranolol 10 MG Oral Tab     Sig: Take 0.5 tablets (5 mg total) by mouth 2 (two) times daily.     Dispense:  90 tablet     Refill:  1          This is a specialized patient consultation in endocrinology and required comprehensive review of prior records, as well as current evaluation, with time required for consideration of complex endocrine issues and consultation. For this visit, I personally interviewed the patient, and family member if accompanied, performed the pertinent parts of the history and physical examination. ROS included screening for appropriate endocrine conditions.   Today's diagnosis and plan were reviewed in detail with the patient who states understanding and agrees with plan. I discussed with the patient possible diagnosis,  differential diagnosis, need for work up, treatment options, alternatives and side effects.     Please see note for details about time spent which includes:   · pre-visit preparation  · reviewing records  · face to face time with the patient   · timely documentation of the encounter  · ordering medications/tests  · communication with care team  · care coordination    I appreciate the opportunity to be part of your patient's medical care and will keep you, as the referring and primary physicians, informed about the care of your patient. Please feel free to contact me should you have any questions.    The 21st Century Cures Act makes medical notes like these available to patients in the interest of transparency. Please be advised this is a medical document. Medical documents are intended to carry relevant information, facts as evident, and the clinical opinion of the practitioner. The medical note is intended as peer to peer communication and may appear blunt or direct. It is written in medical language and may contain abbreviations or verbiage that are unfamiliar.   Scott Black MD

## 2024-11-02 ENCOUNTER — HOSPITAL ENCOUNTER (OUTPATIENT)
Dept: MAMMOGRAPHY | Age: 54
Discharge: HOME | End: 2024-11-02
Attending: INTERNAL MEDICINE
Payer: MEDICAID

## 2024-11-02 DIAGNOSIS — Z12.31 ENCOUNTER FOR SCREENING MAMMOGRAM FOR BREAST CANCER: ICD-10-CM

## 2024-11-02 PROCEDURE — 77067 SCR MAMMO BI INCL CAD: CPT | Performed by: INTERNAL MEDICINE

## 2024-11-02 PROCEDURE — 77063 BREAST TOMOSYNTHESIS BI: CPT | Performed by: INTERNAL MEDICINE

## 2024-11-02 NOTE — TELEPHONE ENCOUNTER
Call went straight to voicemail.   Left message to call office back on Monday or check [employer] message.   Looks like MD already sent the RX on 10/31/24.

## 2024-11-14 ENCOUNTER — HOSPITAL ENCOUNTER (OUTPATIENT)
Dept: MAMMOGRAPHY | Age: 54
Discharge: HOME | End: 2024-11-14
Attending: INTERNAL MEDICINE
Payer: MEDICAID

## 2024-11-14 DIAGNOSIS — R92.2 INCONCLUSIVE MAMMOGRAM: ICD-10-CM

## 2024-11-14 PROCEDURE — 77061 BREAST TOMOSYNTHESIS UNI: CPT | Performed by: INTERNAL MEDICINE

## 2024-11-14 PROCEDURE — 77065 DX MAMMO INCL CAD UNI: CPT | Performed by: INTERNAL MEDICINE

## 2024-11-18 PROBLEM — R92.8 FOLLOW-UP EXAMINATION OF ABNORMAL MAMMOGRAM: Status: ACTIVE | Noted: 2024-11-18

## 2024-12-01 DIAGNOSIS — E05.00 GRAVES DISEASE: Primary | ICD-10-CM

## 2024-12-02 ENCOUNTER — LAB ENCOUNTER (OUTPATIENT)
Dept: LAB | Age: 54
End: 2024-12-02
Attending: INTERNAL MEDICINE
Payer: MEDICAID

## 2024-12-02 ENCOUNTER — NURSE ONLY (OUTPATIENT)
Facility: LOCATION | Age: 54
End: 2024-12-02

## 2024-12-02 DIAGNOSIS — Z23 NEED FOR VACCINATION: Primary | ICD-10-CM

## 2024-12-02 LAB — TSI SER-ACNC: 1.24 UIU/ML (ref 0.55–4.78)

## 2024-12-02 PROCEDURE — 90750 HZV VACC RECOMBINANT IM: CPT | Performed by: INTERNAL MEDICINE

## 2024-12-02 PROCEDURE — 90471 IMMUNIZATION ADMIN: CPT | Performed by: INTERNAL MEDICINE

## 2024-12-02 RX ORDER — MIDODRINE HYDROCHLORIDE 10 MG/1
10 TABLET ORAL 3 TIMES DAILY
Qty: 90 TABLET | Refills: 3 | Status: SHIPPED | OUTPATIENT
Start: 2024-12-02

## 2024-12-02 RX ORDER — FUROSEMIDE 40 MG/1
40 TABLET ORAL DAILY
Qty: 30 TABLET | Refills: 3 | Status: SHIPPED | OUTPATIENT
Start: 2024-12-02

## 2024-12-02 NOTE — TELEPHONE ENCOUNTER
Endocrine Refill protocol for oral medications    Protocol Criteria:  PASSED      If below requirement is met, send a 90-day supply with 1 refill per provider protocol.    Verify appointment with Endocrinology completed in the last 6 months or scheduled in the next 3 months.    Last completed office visit: 10/31/2024 Scott Black MD   Next scheduled Follow up:   Future Appointments   Date Time Provider Department Center   12/12/2024  8:00 AM [facility]

## 2024-12-02 NOTE — TELEPHONE ENCOUNTER
Please let pt know   I do not give rx Apixiban. Gave her 30 tablet so she does not run out   Please ask pcp/cardio to refill if needed.   Thanks

## 2024-12-12 ENCOUNTER — OFFICE VISIT (OUTPATIENT)
Facility: LOCATION | Age: 54
End: 2024-12-12

## 2024-12-12 VITALS
HEART RATE: 71 BPM | HEIGHT: 64 IN | BODY MASS INDEX: 23.05 KG/M2 | WEIGHT: 135 LBS | DIASTOLIC BLOOD PRESSURE: 62 MMHG | SYSTOLIC BLOOD PRESSURE: 120 MMHG

## 2024-12-12 DIAGNOSIS — I48.91 ATRIAL FIBRILLATION WITH RAPID VENTRICULAR RESPONSE (HCC): ICD-10-CM

## 2024-12-12 DIAGNOSIS — E05.90 HYPERTHYROIDISM: ICD-10-CM

## 2024-12-12 DIAGNOSIS — E05.00 GRAVES DISEASE: Primary | ICD-10-CM

## 2024-12-12 DIAGNOSIS — E05.91 THYROTOXICOSIS WITH THYROTOXIC CRISIS, UNSPECIFIED THYROTOXICOSIS TYPE: ICD-10-CM

## 2024-12-12 DIAGNOSIS — E07.9 THYROID DISEASE: ICD-10-CM

## 2024-12-12 DIAGNOSIS — E05.00 GRAVES' ORBITOPATHY: ICD-10-CM

## 2024-12-12 DIAGNOSIS — I42.9 CARDIOMYOPATHY, UNSPECIFIED TYPE (HCC): ICD-10-CM

## 2024-12-12 PROCEDURE — 99214 OFFICE O/P EST MOD 30 MIN: CPT | Performed by: INTERNAL MEDICINE

## 2024-12-12 RX ORDER — METHIMAZOLE 5 MG/1
5 TABLET ORAL DAILY
Qty: 90 TABLET | Refills: 0 | Status: SHIPPED | OUTPATIENT
Start: 2024-12-12

## 2024-12-12 NOTE — PROGRESS NOTES
Reason for Visit:    hyperthyroid with complications. Graves orbitopathy   Requesting Physician: Dr Kemp   ..No primary care provider on file.    CHIEF COMPLAINT:    Chief Complaint   Patient presents with    Thyroid Problem     F/u        HISTORY OF PRESENT ILLNESS:   Shubham Zurita is a 54 year old female who presents with  Graves', Graves orbitopathy. Severe hyperthyroid with A fib, HF  hyperthyroid with complications.    7/30/2024 high TSI, High  TRAb   Strong family hx of thyroid autoimmune disease     In 8/2024, was started on Methimazole 20 mg every day and Propranolol  Rx 30 mg tid stopped 8/15/2024 after she left the hospital.      Seeing Cardiology Dr Kellogg.   9/2024  we decrease methimazole 50 mg once a day  Propranolol 5 mg x2/day  per cardio    Had eye exam and will see the dr franzw.   No hypo or hyperthyroid sx   Skin was dry now it is better   She feels better and stronger   Goiter is smaller   No tremors        Menstrual hx: post mp  Neck surgery: No  Neck radiation: No   Biotin: no  Turmeric:no  Family history of thyroid cancer in 1st degree relatives: no       ASSESSMENT AND PLAN:  Shubham Zurita is a 54 year old female who presents with  Graves', Graves orbitopathy. Severe hyperthyroid with A fib, HF  hyperthyroid with complications.   On methimazole 5 mg once a day  and Propranolol 5 mg x2/day  per cardio  On blood thinner and we discussed refills from cardio   Not on lasix now       Plan  Labs and RTC in 2/2025   methimazole 5 mg once a day -  Propranolol 5 mg x2/day  per cardio    Follow up with cardiology   Follow up with Dr Kemp   Will  follow up  w/ ophtha 12/13/2024 for graves eye disease   Use eye drops up to 4 times a day      Methimazole may cause significant bone marrow depression; the most severe manifestation is agranulocytosis. May also cause Hepatotoxicity (including acute liver failure) Symptoms suggestive of hepatic dysfunction (eg, anorexia, pruritus, right upper quadrant pain)  should prompt evaluation. If you have nausea, vomiting, abdominal pain, jaundice- yellow skin or eye color-, dark urine, light stool color, fever, sore throat or infection, call us or the PCP.  Remission rate of ~ 40% with use of antithyroid drugs for 1-1.5 years, their side effects, monitoring, and follow-up issues, specifically agranulocytosis, liver toxicity, anaphylaxis, rash, lupus like syndrome, metallic taste in the mouth, and joint aches. We discussed that patient should discontinue methimazole at the earliest sign of a fever, sore throat, or other infection, should call our office and have WBC count with differential done. The drug should be held until the result is available.          PAST MEDICAL HISTORY:   No past medical history on file.  Graves  Afib  Graves orbitopathy   Hyperthyroid    PAST SURGICAL HISTORY:   No past surgical history on file.    CURRENT MEDICATIONS:     methIMAzole 5 MG Oral Tab Take 1 tablet (5 mg total) by mouth daily. 90 tablet 0    midodrine 10 MG Oral Tab Take 1 tablet (10 mg total) by mouth in the morning and 1 tablet (10 mg total) at noon and 1 tablet (10 mg total) in the evening. 90 tablet 3    apixaban 5 MG Oral Tab Take 1 tablet (5 mg total) by mouth 2 (two) times daily. 30 tablet 0    propranolol 10 MG Oral Tab Take 0.5 tablets (5 mg total) by mouth 2 (two) times daily. 90 tablet 1    ferrous sulfate 325 (65 FE) MG Oral Tab EC Take 1 tablet (325 mg total) by mouth 2 (two) times daily with meals. 180 tablet 3    flecainide 100 MG Oral Tab Take 1 tablet (100 mg total) by mouth every 12 (twelve) hours. 60 tablet 3       ALLERGIES:  No Known Allergies    SOCIAL HISTORY:    Social History     Socioeconomic History    Marital status:    Tobacco Use    Smoking status: Never    Smokeless tobacco: Never   Vaping Use    Vaping status: Never Used   Substance and Sexual Activity    Alcohol use: Never    Drug use: Never        FAMILY HISTORY:   Family History   Problem Relation  Age of Onset    Breast Cancer Mother 73       Sister with hyperthyroid  Breast ca in mother     PHYSICAL EXAM:   Height: 5' 4\" (162.6 cm) (12/12 0753)  Weight: 135 lb (61.2 kg) (12/12 0753)  BSA (Calculated - sq m): 1.66 sq meters (12/12 0753)  Pulse: 71 (12/12 0753)  BP: 120/62 (12/12 0753)  Temp: --  Do Not Use - Resp Rate: --  SpO2: --    Body mass index is 23.17 kg/m².  No lid lag or lid retraction   Has proptosis  conjunctiva congested   + goiter   Leg swelling       DATA:     Pertinent data reviewed      Latest Reference Range & Units 09/03/24 14:19   T4,Free (Direct) 0.8 - 1.7 ng/dL 0.6 (L)   TSH 0.550 - 4.780 mIU/mL 0.036 (L)   T3 FREE 2.40 - 4.20 pg/mL 1.75 (L)   (L): Data is abnormally low   08/13/24 09:21   CARD NUC STRESS TEST LEXISCAN (CPT 56289/86235/) Rpt      Latest Reference Range & Units 07/29/24 17:31   HCG, Serum Qualitative (S) Negative  Negative      Latest Reference Range & Units 08/02/24 06:41   T4,Free (Direct) 0.8 - 1.7 ng/dL 1.3      Latest Reference Range & Units 07/30/24 09:29 07/30/24 09:35   Thy Stim Immuno 0.00 - 0.55 IU/L  1.85 (H)   Thyrotropin Rec Ab 0.00 - 1.75 IU/L 6.68 (H)    (H): Data is abnormally high     No results for input(s): \"TSH\", \"T4F\", \"T3F\", \"THYP\" in the last 72 hours.  No results found.    Orders Placed This Encounter   Procedures    TSH W Reflex To Free T4     Orders Placed This Encounter    TSH W Reflex To Free T4     Standing Status:   Future     Standing Expiration Date:   12/12/2025     Order Specific Question:   Release to patient     Answer:   Immediate    methIMAzole 5 MG Oral Tab     Sig: Take 1 tablet (5 mg total) by mouth daily.     Dispense:  90 tablet     Refill:  0          This is a specialized patient consultation in endocrinology and required comprehensive review of prior records, as well as current evaluation, with time required for consideration of complex endocrine issues and consultation. For this visit, I personally interviewed the  patient, and family member if accompanied, performed the pertinent parts of the history and physical examination. ROS included screening for appropriate endocrine conditions.   Today's diagnosis and plan were reviewed in detail with the patient who states understanding and agrees with plan. I discussed with the patient possible diagnosis, differential diagnosis, need for work up, treatment options, alternatives and side effects.     Please see note for details about time spent which includes:   · pre-visit preparation  · reviewing records  · face to face time with the patient   · timely documentation of the encounter  · ordering medications/tests  · communication with care team  · care coordination    I appreciate the opportunity to be part of your patient's medical care and will keep you, as the referring and primary physicians, informed about the care of your patient. Please feel free to contact me should you have any questions.    The 21st Century Cures Act makes medical notes like these available to patients in the interest of transparency. Please be advised this is a medical document. Medical documents are intended to carry relevant information, facts as evident, and the clinical opinion of the practitioner. The medical note is intended as peer to peer communication and may appear blunt or direct. It is written in medical language and may contain abbreviations or verbiage that are unfamiliar.   Scott Black MD

## 2024-12-12 NOTE — PATIENT INSTRUCTIONS
Labs and RTC in 2/2025   methimazole 5 mg once a day -  Propranolol 5 mg x2/day  per cardio    Follow up with cardiology   Follow up with Dr Kemp   Will  follow up  w/ ophtha 12/13/2024 for graves eye disease   Use eye drops up to 4 times a day      Methimazole may cause significant bone marrow depression; the most severe manifestation is agranulocytosis. May also cause Hepatotoxicity (including acute liver failure) Symptoms suggestive of hepatic dysfunction (eg, anorexia, pruritus, right upper quadrant pain) should prompt evaluation. If you have nausea, vomiting, abdominal pain, jaundice- yellow skin or eye color-, dark urine, light stool color, fever, sore throat or infection, call us or the PCP.  Remission rate of ~ 40% with use of antithyroid drugs for 1-1.5 years, their side effects, monitoring, and follow-up issues, specifically agranulocytosis, liver toxicity, anaphylaxis, rash, lupus like syndrome, metallic taste in the mouth, and joint aches. We discussed that patient should discontinue methimazole at the earliest sign of a fever, sore throat, or other infection, should call our office and have WBC count with differential done. The drug should be held until the result is available.

## 2025-02-19 ENCOUNTER — LAB ENCOUNTER (OUTPATIENT)
Dept: LAB | Age: 55
End: 2025-02-19
Attending: INTERNAL MEDICINE
Payer: MEDICAID

## 2025-02-19 DIAGNOSIS — E07.9 THYROID DISEASE: ICD-10-CM

## 2025-02-19 DIAGNOSIS — I48.91 ATRIAL FIBRILLATION WITH RAPID VENTRICULAR RESPONSE (HCC): ICD-10-CM

## 2025-02-19 DIAGNOSIS — E05.00 GRAVES DISEASE: ICD-10-CM

## 2025-02-19 LAB — TSI SER-ACNC: 4.69 UIU/ML (ref 0.55–4.78)

## 2025-02-19 PROCEDURE — 36415 COLL VENOUS BLD VENIPUNCTURE: CPT

## 2025-02-19 PROCEDURE — 84443 ASSAY THYROID STIM HORMONE: CPT

## 2025-02-25 ENCOUNTER — OFFICE VISIT (OUTPATIENT)
Facility: LOCATION | Age: 55
End: 2025-02-25

## 2025-02-25 VITALS
WEIGHT: 153 LBS | HEIGHT: 64 IN | DIASTOLIC BLOOD PRESSURE: 62 MMHG | BODY MASS INDEX: 26.12 KG/M2 | HEART RATE: 62 BPM | SYSTOLIC BLOOD PRESSURE: 104 MMHG

## 2025-02-25 DIAGNOSIS — I42.9 CARDIOMYOPATHY, UNSPECIFIED TYPE (HCC): ICD-10-CM

## 2025-02-25 DIAGNOSIS — E07.9 THYROID DISEASE: Primary | ICD-10-CM

## 2025-02-25 DIAGNOSIS — E05.00 GRAVES' ORBITOPATHY: ICD-10-CM

## 2025-02-25 DIAGNOSIS — I48.91 ATRIAL FIBRILLATION WITH RAPID VENTRICULAR RESPONSE (HCC): ICD-10-CM

## 2025-02-25 DIAGNOSIS — E05.00 GRAVES DISEASE: ICD-10-CM

## 2025-02-25 PROCEDURE — 99214 OFFICE O/P EST MOD 30 MIN: CPT | Performed by: INTERNAL MEDICINE

## 2025-02-25 RX ORDER — METHIMAZOLE 5 MG/1
5 TABLET ORAL DAILY
Qty: 90 TABLET | Refills: 0 | Status: SHIPPED | OUTPATIENT
Start: 2025-02-25

## 2025-02-25 NOTE — PATIENT INSTRUCTIONS
Labs and RTC in 5/2025   methimazole 5 mg once a day -  Propranolol 5 mg x2/day  per cardio    Follow up with cardiology     Discussed with patient possible dx, treatment options, SALGADO vs surgery vs meds. Discussed thyroid  , wt gain, eye disease, and SE of meds and SALGADO. Methimazole may cause significant bone marrow depression; the most severe manifestation is agranulocytosis. May also cause Hepatotoxicity (including acute liver failure) Symptoms suggestive of hepatic dysfunction (eg, anorexia, pruritus, right upper quadrant pain) should prompt evaluation. If you have nausea, vomiting, abdominal pain, jaundice- yellow skin or eye color-, dark urine, light stool color, fever, sore throat or infection, call us or the PCP.  Remission rate of ~ 40% with use of antithyroid drugs for 1-1.5 years, their side effects, monitoring, and follow-up issues, specifically agranulocytosis, liver toxicity, anaphylaxis, rash, lupus like syndrome, metallic taste in the mouth, and joint aches. We discussed that patient should discontinue methimazole at the earliest sign of a fever, sore throat, or other infection, should call our office and have WBC count with differential done. The drug should be held until the result is available.

## 2025-02-25 NOTE — PROGRESS NOTES
Reason for Visit:    hyperthyroid with complications. Graves orbitopathy   Requesting Physician: Dr Kemp   ..No primary care provider on file.    CHIEF COMPLAINT:    Chief Complaint   Patient presents with    Thyroid Problem     F/u        HISTORY OF PRESENT ILLNESS:   Shubham Zurita is a 54 year old female who presents with  Graves', Graves orbitopathy. Severe hyperthyroid with A fib, HF  hyperthyroid with complications.  She cannot stand for long time. She is not working yet   Had leg swelling when took a trip to [place] but in general no leg swelling   No hyperthyroid sx   Goiter is smaller   Had eye exam and CT   on methimazole 5 mg once a day  Propranolol 5 mg x2/day  per cardio  7/30/2024 high TSI, High  TRAb   Strong family hx of thyroid autoimmune disease     In 8/2024, was started on Methimazole 20 mg every day and Propranolol  Rx 30 mg tid stopped 8/15/2024 after she left the hospital.      Seeing Cardiology Dr Kellogg.        Had eye exam 12/2024 and had CT scan. No GO per pt.   No hypo or hyperthyroid sx    Goiter is smaller   No tremors        Menstrual hx: post mp  Neck surgery: No  Neck radiation: No   Biotin: no  Turmeric:no  Family history of thyroid cancer in 1st degree relatives: no       ASSESSMENT AND PLAN:  Shubham Zurita is a 54 year old female who presents with  Graves', Graves orbitopathy. Severe hyperthyroid with A fib, HF  hyperthyroid with complications.   On methimazole 5 mg once a day  and Propranolol 5 mg x2/day  per cardio   Clinically and biochemically euthyroid  Will check TSI and assess if she is in remission       Plan    Labs and RTC in 5/2025   methimazole 5 mg once a day -  Propranolol 5 mg x2/day  per cardio    Follow up with cardiology     Discussed with patient possible dx, treatment options, SALGADO vs surgery vs meds. Discussed thyroid  , wt gain, eye disease, and SE of meds and SALGADO. Methimazole may cause significant bone marrow depression; the most severe manifestation is  agranulocytosis. May also cause Hepatotoxicity (including acute liver failure) Symptoms suggestive of hepatic dysfunction (eg, anorexia, pruritus, right upper quadrant pain) should prompt evaluation. If you have nausea, vomiting, abdominal pain, jaundice- yellow skin or eye color-, dark urine, light stool color, fever, sore throat or infection, call us or the PCP.  Remission rate of ~ 40% with use of antithyroid drugs for 1-1.5 years, their side effects, monitoring, and follow-up issues, specifically agranulocytosis, liver toxicity, anaphylaxis, rash, lupus like syndrome, metallic taste in the mouth, and joint aches. We discussed that patient should discontinue methimazole at the earliest sign of a fever, sore throat, or other infection, should call our office and have WBC count with differential done. The drug should be held until the result is available.             PAST MEDICAL HISTORY:   History reviewed. No pertinent past medical history.  Graves  Afib  Graves orbitopathy   Hyperthyroid    PAST SURGICAL HISTORY:   History reviewed. No pertinent surgical history.    CURRENT MEDICATIONS:     methIMAzole 5 MG Oral Tab Take 1 tablet (5 mg total) by mouth daily. 90 tablet 0    midodrine 10 MG Oral Tab Take 1 tablet (10 mg total) by mouth in the morning and 1 tablet (10 mg total) at noon and 1 tablet (10 mg total) in the evening. 90 tablet 3    apixaban 5 MG Oral Tab Take 1 tablet (5 mg total) by mouth 2 (two) times daily. 30 tablet 0    propranolol 10 MG Oral Tab Take 0.5 tablets (5 mg total) by mouth 2 (two) times daily. 90 tablet 1    ferrous sulfate 325 (65 FE) MG Oral Tab EC Take 1 tablet (325 mg total) by mouth 2 (two) times daily with meals. 180 tablet 3       ALLERGIES:  No Known Allergies    SOCIAL HISTORY:    Social History     Socioeconomic History    Marital status:    Tobacco Use    Smoking status: Never    Smokeless tobacco: Never   Vaping Use    Vaping status: Never Used   Substance and Sexual  Activity    Alcohol use: Never    Drug use: Never        FAMILY HISTORY:   Family History   Problem Relation Age of Onset    Breast Cancer Mother 73       Sister with hyperthyroid  Breast ca in mother     PHYSICAL EXAM:   Height: 5' 4\" (162.6 cm) (02/25 1117)  Weight: 153 lb (69.4 kg) (02/25 1117)  BSA (Calculated - sq m): 1.75 sq meters (02/25 1117)  Pulse: 62 (02/25 1117)  BP: 104/62 (02/25 1117)  Temp: --  Do Not Use - Resp Rate: --  SpO2: --    Body mass index is 26.26 kg/m².  No lid lag or lid retraction   Has proptosis  conjunctiva congested   + goiter   Leg swelling       DATA:     Pertinent data reviewed      Latest Reference Range & Units 09/03/24 14:19   T4,Free (Direct) 0.8 - 1.7 ng/dL 0.6 (L)   TSH 0.550 - 4.780 mIU/mL 0.036 (L)   T3 FREE 2.40 - 4.20 pg/mL 1.75 (L)   (L): Data is abnormally low   08/13/24 09:21   CARD NUC STRESS TEST LEXISCAN (CPT 99852/27329/) Rpt      Latest Reference Range & Units 07/29/24 17:31   HCG, Serum Qualitative (S) Negative  Negative      Latest Reference Range & Units 08/02/24 06:41   T4,Free (Direct) 0.8 - 1.7 ng/dL 1.3      Latest Reference Range & Units 07/30/24 09:29 07/30/24 09:35   Thy Stim Immuno 0.00 - 0.55 IU/L  1.85 (H)   Thyrotropin Rec Ab 0.00 - 1.75 IU/L 6.68 (H)    (H): Data is abnormally high     No results for input(s): \"TSH\", \"T4F\", \"T3F\", \"THYP\" in the last 72 hours.  No results found.    Orders Placed This Encounter   Procedures    Thyroid Stimulating Immunoglobulin    TSH W Reflex To Free T4     Orders Placed This Encounter    Thyroid Stimulating Immunoglobulin     Order Specific Question:   Release to patient     Answer:   Immediate    TSH W Reflex To Free T4     Standing Status:   Future     Standing Expiration Date:   2/25/2026     Order Specific Question:   Release to patient     Answer:   Immediate    methIMAzole 5 MG Oral Tab     Sig: Take 1 tablet (5 mg total) by mouth daily.     Dispense:  90 tablet     Refill:  0          This is a  specialized patient consultation in endocrinology and required comprehensive review of prior records, as well as current evaluation, with time required for consideration of complex endocrine issues and consultation. For this visit, I personally interviewed the patient, and family member if accompanied, performed the pertinent parts of the history and physical examination. ROS included screening for appropriate endocrine conditions.   Today's diagnosis and plan were reviewed in detail with the patient who states understanding and agrees with plan. I discussed with the patient possible diagnosis, differential diagnosis, need for work up, treatment options, alternatives and side effects.     Please see note for details about time spent which includes:   · pre-visit preparation  · reviewing records  · face to face time with the patient   · timely documentation of the encounter  · ordering medications/tests  · communication with care team  · care coordination    I appreciate the opportunity to be part of your patient's medical care and will keep you, as the referring and primary physicians, informed about the care of your patient. Please feel free to contact me should you have any questions.    The 21st Century Cures Act makes medical notes like these available to patients in the interest of transparency. Please be advised this is a medical document. Medical documents are intended to carry relevant information, facts as evident, and the clinical opinion of the practitioner. The medical note is intended as peer to peer communication and may appear blunt or direct. It is written in medical language and may contain abbreviations or verbiage that are unfamiliar.   Scott Black MD

## 2025-04-07 DIAGNOSIS — E05.00 GRAVES DISEASE: ICD-10-CM

## 2025-04-07 RX ORDER — APIXABAN 5 MG/1
5 TABLET, FILM COATED ORAL 2 TIMES DAILY
Qty: 60 TABLET | Refills: 0 | OUTPATIENT
Start: 2025-04-07

## 2025-04-07 NOTE — TELEPHONE ENCOUNTER
Endocrine Refill protocol for oral medications    Protocol Criteria:  PASSED  Reason: N/A    If below requirement is met, send a 90-day supply with 1 refill per provider protocol.    Verify appointment with Endocrinology completed in the last 6 months or scheduled in the next 3 months.    Last completed office visit: 2/25/2025 Scott Black MD   Next scheduled Follow up:   Future Appointments   Date Time Provider Department Center   5/27/2025 10:15 AM [facility] [place]

## 2025-05-09 ENCOUNTER — LAB ENCOUNTER (OUTPATIENT)
Dept: LAB | Age: 55
End: 2025-05-09
Attending: INTERNAL MEDICINE
Payer: MEDICAID

## 2025-05-09 DIAGNOSIS — E07.9 THYROID DISEASE: ICD-10-CM

## 2025-05-09 LAB
T4 FREE SERPL-MCNC: 2.3 NG/DL (ref 0.8–1.7)
TSI SER-ACNC: 0.01 UIU/ML (ref 0.55–4.78)

## 2025-05-09 PROCEDURE — 84445 ASSAY OF TSI GLOBULIN: CPT | Performed by: INTERNAL MEDICINE

## 2025-05-09 PROCEDURE — 84443 ASSAY THYROID STIM HORMONE: CPT

## 2025-05-09 PROCEDURE — 36415 COLL VENOUS BLD VENIPUNCTURE: CPT

## 2025-05-09 PROCEDURE — 84439 ASSAY OF FREE THYROXINE: CPT

## 2025-05-12 LAB — THY STIM IMMUNO: 2.68 IU/L

## 2025-05-27 ENCOUNTER — OFFICE VISIT (OUTPATIENT)
Facility: LOCATION | Age: 55
End: 2025-05-27
Payer: MEDICAID

## 2025-05-27 ENCOUNTER — LAB ENCOUNTER (OUTPATIENT)
Dept: LAB | Age: 55
End: 2025-05-27
Attending: INTERNAL MEDICINE
Payer: MEDICAID

## 2025-05-27 VITALS
HEIGHT: 64 IN | BODY MASS INDEX: 25.95 KG/M2 | SYSTOLIC BLOOD PRESSURE: 92 MMHG | HEART RATE: 60 BPM | DIASTOLIC BLOOD PRESSURE: 58 MMHG | WEIGHT: 152 LBS

## 2025-05-27 DIAGNOSIS — E07.9 THYROID DISEASE: ICD-10-CM

## 2025-05-27 DIAGNOSIS — I48.91 ATRIAL FIBRILLATION WITH RAPID VENTRICULAR RESPONSE (HCC): ICD-10-CM

## 2025-05-27 DIAGNOSIS — E05.90 HYPERTHYROIDISM: ICD-10-CM

## 2025-05-27 DIAGNOSIS — E05.00 GRAVES' ORBITOPATHY: ICD-10-CM

## 2025-05-27 DIAGNOSIS — I50.9 ACUTE HEART FAILURE, UNSPECIFIED HEART FAILURE TYPE (HCC): ICD-10-CM

## 2025-05-27 DIAGNOSIS — I42.9 CARDIOMYOPATHY, UNSPECIFIED TYPE (HCC): ICD-10-CM

## 2025-05-27 DIAGNOSIS — E05.00 GRAVES DISEASE: Primary | ICD-10-CM

## 2025-05-27 LAB — T4 FREE SERPL-MCNC: 1.4 NG/DL (ref 0.8–1.7)

## 2025-05-27 PROCEDURE — 99214 OFFICE O/P EST MOD 30 MIN: CPT | Performed by: INTERNAL MEDICINE

## 2025-05-27 PROCEDURE — 84439 ASSAY OF FREE THYROXINE: CPT

## 2025-05-27 PROCEDURE — 36415 COLL VENOUS BLD VENIPUNCTURE: CPT

## 2025-05-27 RX ORDER — METHIMAZOLE 5 MG/1
5 TABLET ORAL 2 TIMES DAILY
Qty: 120 TABLET | Refills: 1 | Status: SHIPPED | OUTPATIENT
Start: 2025-05-27

## 2025-05-27 NOTE — PATIENT INSTRUCTIONS
Labs today   Labs before next visit    RTC in 7/2025   methimazole 5 mg x2/ day -  Propranolol 5 mg x2/day  per cardio    Follow up with cardiology     Discussed with patient possible dx, treatment options, SALGADO vs surgery vs meds. Discussed thyroid  , wt gain, eye disease, and SE of meds and SALGADO. Methimazole may cause significant bone marrow depression; the most severe manifestation is agranulocytosis. May also cause Hepatotoxicity (including acute liver failure) Symptoms suggestive of hepatic dysfunction (eg, anorexia, pruritus, right upper quadrant pain) should prompt evaluation. If you have nausea, vomiting, abdominal pain, jaundice- yellow skin or eye color-, dark urine, light stool color, fever, sore throat or infection, call us or the PCP.  Remission rate of ~ 40% with use of antithyroid drugs for 1-1.5 years, their side effects, monitoring, and follow-up issues, specifically agranulocytosis, liver toxicity, anaphylaxis, rash, lupus like syndrome, metallic taste in the mouth, and joint aches. We discussed that patient should discontinue methimazole at the earliest sign of a fever, sore throat, or other infection, should call our office and have WBC count with differential done. The drug should be held until the result is available.

## 2025-05-27 NOTE — PROGRESS NOTES
Reason for Visit:    hyperthyroid with complications. Graves orbitopathy   Requesting Physician: Dr Kemp   ..No primary care provider on file.    CHIEF COMPLAINT:    Chief Complaint   Patient presents with    Thyroid Problem     F/u        HISTORY OF PRESENT ILLNESS:   Shubham Zurita is a 55 year old female who presents with  Graves', Graves orbitopathy. Severe hyperthyroid with A fib, HF  hyperthyroid with complications.  She had hair loss, weight gain and chest tightness early March she was on MMI 5 mg every day   In the last 3 weeks, she increased to 5 mg bid   After 1 week, she started feeling better       Had eye exam and CT   on methimazole 5 mg x2 a day since 5/8/2025  Propranolol 5 mg x2/day  per cardio  7/30/2024 high TSI, High  TRAb   Strong family hx of thyroid autoimmune disease     In 8/2024, was started on Methimazole 20 mg every day and Propranolol  Rx 30 mg tid stopped 8/15/2024      Seeing Cardiology Dr Kellogg.        Had eye exam 12/2024 and had CT scan. No GO per pt.   No hypo or hyperthyroid sx    Goiter is smaller   No tremors        Menstrual hx: post mp  Neck surgery: No  Neck radiation: No   Biotin: no  Turmeric:no  Family history of thyroid cancer in 1st degree relatives: no       ASSESSMENT AND PLAN:  Shubham Zurita is a 55 year old female who presents with  Graves', Graves orbitopathy. Severe hyperthyroid with A fib, HF  hyperthyroid with complications.   On methimazole 5 mg x2/ day  and Propranolol 5 mg x2/day  per cardio   Clinically and biochemically hyperthyroid on once a day MMI 5 so we increased the dose. Now she is better. Will get labs to reassess   High TSI 5/2025. Had stress in March and April        Plan  Labs today   Labs before next visit    RTC in 7/2025   methimazole 5 mg x2/ day -  Propranolol 5 mg x2/day  per cardio    Follow up with cardiology     Discussed with patient possible dx, treatment options, SALGADO vs surgery vs meds. Discussed thyroid  , wt gain, eye disease, and  SE of meds and SALGADO. Methimazole may cause significant bone marrow depression; the most severe manifestation is agranulocytosis. May also cause Hepatotoxicity (including acute liver failure) Symptoms suggestive of hepatic dysfunction (eg, anorexia, pruritus, right upper quadrant pain) should prompt evaluation. If you have nausea, vomiting, abdominal pain, jaundice- yellow skin or eye color-, dark urine, light stool color, fever, sore throat or infection, call us or the PCP.  Remission rate of ~ 40% with use of antithyroid drugs for 1-1.5 years, their side effects, monitoring, and follow-up issues, specifically agranulocytosis, liver toxicity, anaphylaxis, rash, lupus like syndrome, metallic taste in the mouth, and joint aches. We discussed that patient should discontinue methimazole at the earliest sign of a fever, sore throat, or other infection, should call our office and have WBC count with differential done. The drug should be held until the result is available.             PAST MEDICAL HISTORY:   No past medical history on file.  Graves  Afib  Graves orbitopathy   Hyperthyroid    PAST SURGICAL HISTORY:   No past surgical history on file.    CURRENT MEDICATIONS:     methIMAzole 5 MG Oral Tab Take 1 tablet (5 mg total) by mouth in the morning and 1 tablet (5 mg total) before bedtime. 120 tablet 1    propranolol 10 MG Oral Tab Take 0.5 tablets (5 mg total) by mouth 2 (two) times daily. 90 tablet 1       ALLERGIES:  No Known Allergies    SOCIAL HISTORY:    Social History     Socioeconomic History    Marital status:    Tobacco Use    Smoking status: Never    Smokeless tobacco: Never   Vaping Use    Vaping status: Never Used   Substance and Sexual Activity    Alcohol use: Never    Drug use: Never        FAMILY HISTORY:   Family History   Problem Relation Age of Onset    Breast Cancer Mother 73       Sister with hyperthyroid  Breast ca in mother     PHYSICAL EXAM:   Height: 5' 4\" (162.6 cm) (05/27 1012)  Weight:  152 lb (68.9 kg) (05/27 1012)  BSA (Calculated - sq m): 1.74 sq meters (05/27 1012)  Pulse: 60 (05/27 1012)  BP: 92/58 (05/27 1012)  Temp: --  Do Not Use - Resp Rate: --  SpO2: --    Body mass index is 26.09 kg/m².  No lid lag or lid retraction   Has proptosis  conjunctiva congested   + goiter   Leg swelling       DATA:     Pertinent data reviewed      Latest Reference Range & Units 09/03/24 14:19   T4,Free (Direct) 0.8 - 1.7 ng/dL 0.6 (L)   TSH 0.550 - 4.780 mIU/mL 0.036 (L)   T3 FREE 2.40 - 4.20 pg/mL 1.75 (L)   (L): Data is abnormally low   08/13/24 09:21   CARD NUC STRESS TEST LEXISCAN (CPT 82264/20561/) Rpt      Latest Reference Range & Units 07/29/24 17:31   HCG, Serum Qualitative (S) Negative  Negative      Latest Reference Range & Units 08/02/24 06:41   T4,Free (Direct) 0.8 - 1.7 ng/dL 1.3      Latest Reference Range & Units 07/30/24 09:29 07/30/24 09:35   Thy Stim Immuno 0.00 - 0.55 IU/L  1.85 (H)   Thyrotropin Rec Ab 0.00 - 1.75 IU/L 6.68 (H)    (H): Data is abnormally high     No results for input(s): \"TSH\", \"T4F\", \"T3F\", \"THYP\" in the last 72 hours.  No results found.    Orders Placed This Encounter   Procedures    Free T4, (Free Thyroxine)    Free T4, (Free Thyroxine)     Orders Placed This Encounter    Free T4, (Free Thyroxine)     Standing Status:   Future     Expected Date:   6/27/2025     Expiration Date:   5/27/2026     Release to patient:   Immediate    Free T4, (Free Thyroxine)     Standing Status:   Future     Expected Date:   5/27/2025     Expiration Date:   5/27/2026     Release to patient:   Immediate    methIMAzole 5 MG Oral Tab     Sig: Take 1 tablet (5 mg total) by mouth in the morning and 1 tablet (5 mg total) before bedtime.     Dispense:  120 tablet     Refill:  1          This is a specialized patient consultation in endocrinology and required comprehensive review of prior records, as well as current evaluation, with time required for consideration of complex endocrine issues and  consultation. For this visit, I personally interviewed the patient, and family member if accompanied, performed the pertinent parts of the history and physical examination. ROS included screening for appropriate endocrine conditions.   Today's diagnosis and plan were reviewed in detail with the patient who states understanding and agrees with plan. I discussed with the patient possible diagnosis, differential diagnosis, need for work up, treatment options, alternatives and side effects.     Please see note for details about time spent which includes:   · pre-visit preparation  · reviewing records  · face to face time with the patient   · timely documentation of the encounter  · ordering medications/tests  · communication with care team  · care coordination    I appreciate the opportunity to be part of your patient's medical care and will keep you, as the referring and primary physicians, informed about the care of your patient. Please feel free to contact me should you have any questions.    The 21st Century Cures Act makes medical notes like these available to patients in the interest of transparency. Please be advised this is a medical document. Medical documents are intended to carry relevant information, facts as evident, and the clinical opinion of the practitioner. The medical note is intended as peer to peer communication and may appear blunt or direct. It is written in medical language and may contain abbreviations or verbiage that are unfamiliar.   Scott Black MD

## 2025-06-06 ENCOUNTER — APPOINTMENT (OUTPATIENT)
Dept: GENERAL RADIOLOGY | Facility: HOSPITAL | Age: 55
End: 2025-06-06
Attending: EMERGENCY MEDICINE
Payer: MEDICAID

## 2025-06-06 ENCOUNTER — HOSPITAL ENCOUNTER (EMERGENCY)
Facility: HOSPITAL | Age: 55
Discharge: HOME | End: 2025-06-06
Attending: EMERGENCY MEDICINE
Payer: MEDICAID

## 2025-06-06 VITALS
RESPIRATION RATE: 21 BRPM | WEIGHT: 150 LBS | HEART RATE: 85 BPM | OXYGEN SATURATION: 96 % | SYSTOLIC BLOOD PRESSURE: 109 MMHG | TEMPERATURE: 99 F | BODY MASS INDEX: 25.61 KG/M2 | HEIGHT: 64 IN | DIASTOLIC BLOOD PRESSURE: 60 MMHG

## 2025-06-06 DIAGNOSIS — R79.89 HIGH SERUM TRIIODOTHYRONINE (T3): ICD-10-CM

## 2025-06-06 DIAGNOSIS — R07.9 CHEST PAIN OF UNCERTAIN ETIOLOGY: Primary | ICD-10-CM

## 2025-06-06 LAB
ALBUMIN SERPL-MCNC: 4.3 G/DL (ref 3.2–4.8)
ALBUMIN/GLOB SERPL: 1.5 {RATIO} (ref 1–2)
ALP LIVER SERPL-CCNC: 89 U/L (ref 46–118)
ALT SERPL-CCNC: 11 U/L (ref 10–49)
ANION GAP SERPL CALC-SCNC: 9 MMOL/L (ref 0–18)
AST SERPL-CCNC: 22 U/L (ref ?–34)
ATRIAL RATE: 92 BPM
BASOPHILS # BLD AUTO: 0.04 X10(3) UL (ref 0–0.2)
BASOPHILS NFR BLD AUTO: 0.8 %
BILIRUB SERPL-MCNC: 0.7 MG/DL (ref 0.3–1.2)
BUN BLD-MCNC: 16 MG/DL (ref 9–23)
BUN/CREAT SERPL: 18 (ref 10–20)
CALCIUM BLD-MCNC: 9.5 MG/DL (ref 8.7–10.4)
CHLORIDE SERPL-SCNC: 106 MMOL/L (ref 98–112)
CO2 SERPL-SCNC: 26 MMOL/L (ref 21–32)
CREAT BLD-MCNC: 0.89 MG/DL (ref 0.55–1.02)
DEPRECATED RDW RBC AUTO: 35.9 FL (ref 35.1–46.3)
EGFRCR SERPLBLD CKD-EPI 2021: 76 ML/MIN/1.73M2 (ref 60–?)
EOSINOPHIL # BLD AUTO: 0.13 X10(3) UL (ref 0–0.7)
EOSINOPHIL NFR BLD AUTO: 2.5 %
ERYTHROCYTE [DISTWIDTH] IN BLOOD BY AUTOMATED COUNT: 11.8 % (ref 11–15)
GLOBULIN PLAS-MCNC: 2.8 G/DL (ref 2–3.5)
GLUCOSE BLD-MCNC: 109 MG/DL (ref 70–99)
HCT VFR BLD AUTO: 38.4 % (ref 35–48)
HGB BLD-MCNC: 13.3 G/DL (ref 12–16)
IMM GRANULOCYTES # BLD AUTO: 0.01 X10(3) UL (ref 0–1)
IMM GRANULOCYTES NFR BLD: 0.2 %
LYMPHOCYTES # BLD AUTO: 3.02 X10(3) UL (ref 1–4)
LYMPHOCYTES NFR BLD AUTO: 58 %
MCH RBC QN AUTO: 29.4 PG (ref 26–34)
MCHC RBC AUTO-ENTMCNC: 34.6 G/DL (ref 31–37)
MCV RBC AUTO: 85 FL (ref 80–100)
MONOCYTES # BLD AUTO: 0.37 X10(3) UL (ref 0.1–1)
MONOCYTES NFR BLD AUTO: 7.1 %
NEUTROPHILS # BLD AUTO: 1.64 X10 (3) UL (ref 1.5–7.7)
NEUTROPHILS # BLD AUTO: 1.64 X10(3) UL (ref 1.5–7.7)
NEUTROPHILS NFR BLD AUTO: 31.4 %
OSMOLALITY SERPL CALC.SUM OF ELEC: 294 MOSM/KG (ref 275–295)
P AXIS: 33 DEGREES
P-R INTERVAL: 194 MS
PLATELET # BLD AUTO: 202 10(3)UL (ref 150–450)
POTASSIUM SERPL-SCNC: 3.7 MMOL/L (ref 3.5–5.1)
PROT SERPL-MCNC: 7.1 G/DL (ref 5.7–8.2)
Q-T INTERVAL: 356 MS
QRS DURATION: 76 MS
QTC CALCULATION (BEZET): 440 MS
R AXIS: 37 DEGREES
RBC # BLD AUTO: 4.52 X10(6)UL (ref 3.8–5.3)
SODIUM SERPL-SCNC: 141 MMOL/L (ref 136–145)
T AXIS: 23 DEGREES
T3FREE SERPL-MCNC: 4.35 PG/ML (ref 2.4–4.2)
T4 FREE SERPL-MCNC: 1.7 NG/DL (ref 0.8–1.7)
TROPONIN I SERPL HS-MCNC: 7 NG/L (ref ?–34)
TROPONIN I SERPL HS-MCNC: 8 NG/L (ref ?–34)
TSI SER-ACNC: 0.02 UIU/ML (ref 0.55–4.78)
VENTRICULAR RATE: 92 BPM
WBC # BLD AUTO: 5.2 X10(3) UL (ref 4–11)

## 2025-06-06 PROCEDURE — 99284 EMERGENCY DEPT VISIT MOD MDM: CPT

## 2025-06-06 PROCEDURE — 84439 ASSAY OF FREE THYROXINE: CPT | Performed by: EMERGENCY MEDICINE

## 2025-06-06 PROCEDURE — 36415 COLL VENOUS BLD VENIPUNCTURE: CPT

## 2025-06-06 PROCEDURE — 93005 ELECTROCARDIOGRAM TRACING: CPT

## 2025-06-06 PROCEDURE — 85025 COMPLETE CBC W/AUTO DIFF WBC: CPT | Performed by: EMERGENCY MEDICINE

## 2025-06-06 PROCEDURE — 84443 ASSAY THYROID STIM HORMONE: CPT | Performed by: EMERGENCY MEDICINE

## 2025-06-06 PROCEDURE — 84481 FREE ASSAY (FT-3): CPT | Performed by: EMERGENCY MEDICINE

## 2025-06-06 PROCEDURE — 80053 COMPREHEN METABOLIC PANEL: CPT | Performed by: EMERGENCY MEDICINE

## 2025-06-06 PROCEDURE — 84484 ASSAY OF TROPONIN QUANT: CPT | Performed by: EMERGENCY MEDICINE

## 2025-06-06 PROCEDURE — 71045 X-RAY EXAM CHEST 1 VIEW: CPT | Performed by: EMERGENCY MEDICINE

## 2025-06-06 PROCEDURE — 93010 ELECTROCARDIOGRAM REPORT: CPT

## 2025-06-06 NOTE — ED PROVIDER NOTES
Patient Seen in: [facility] Emergency Department    History     Chief Complaint   Patient presents with    Chest Pain       HPI    75-year-old female presents ER with complaints of chest pain.  Patient states she woke up at 230 for her normal morning.  Started having pain in her epigastric and chest area at around 3:10 AM.  Patient with past medical history of myopathy, CHF, paroxysmal A-fib, thyroid disease.  Patient states her pain is significantly improved with  nitro and aspirin in the ER.  Patient states she feels comfortable at this time    History reviewed. Past Medical History[1]    History reviewed. Past Surgical History[2]      Medications :  Prescriptions Prior to Admission[3]     Family History[4]    Smoking Status: Social Hx on file[5]    ROS  All pertinent positives for the review of systems are mentioned in the HPI  All other organ systems are reviewed and are negative.    Constitutional and vital signs reviewed.      Social History and Family History elements reviewed from today, pertinent positives to the presenting problem noted.    Physical Exam     ED Triage Vitals   BP 06/06/25 0432 155/78   Pulse 06/06/25 0430 92   Resp 06/06/25 0430 16   Temp 06/06/25 0430 98.8 °F (37.1 °C)   Temp src 06/06/25 0430 Temporal   SpO2 06/06/25 0430 98 %   O2 Device 06/06/25 0430 None (Room air)       All measures to prevent infection transmission during my interaction with the patient were taken. The patient was already wearing a droplet mask on my arrival to the room. Personal protective equipment including droplet mask, eye protection, and gloves were worn throughout the duration of the exam.  Handwashing was performed prior to and after the exam.  Stethoscope and any equipment used during my examination was cleaned with super sani-cloth germicidal wipes following the exam.     Physical Exam  Vitals and nursing note reviewed.   Constitutional:       Appearance: She is well-developed.   HENT:      Head:  Normocephalic and atraumatic.      Right Ear: External ear normal.      Left Ear: External ear normal.      Nose: Nose normal.   Eyes:      Conjunctiva/sclera: Conjunctivae normal.      Pupils: Pupils are equal, round, and reactive to light.   Cardiovascular:      Rate and Rhythm: Normal rate and regular rhythm.      Heart sounds: Normal heart sounds.   Pulmonary:      Effort: Pulmonary effort is normal.      Breath sounds: Normal breath sounds. No decreased breath sounds or wheezing.   Abdominal:      General: Bowel sounds are normal.      Palpations: Abdomen is soft.   Musculoskeletal:         General: Normal range of motion.      Cervical back: Normal range of motion and neck supple.   Skin:     General: Skin is warm and dry.   Neurological:      General: No focal deficit present.      Mental Status: She is alert. She is disoriented.      Deep Tendon Reflexes: Reflexes are normal and symmetric.   Psychiatric:         Behavior: Behavior normal.         Thought Content: Thought content normal.         Judgment: Judgment normal.         ED Course        Labs Reviewed   COMP METABOLIC PANEL (14) - Abnormal; Notable for the following components:       Result Value    Glucose 109 (*)     All other components within normal limits   TSH W REFLEX TO FREE T4 - Abnormal; Notable for the following components:    TSH 0.024 (*)     All other components within normal limits   TROPONIN I HIGH SENSITIVITY - Normal   CBC WITH DIFFERENTIAL WITH PLATELET   T4, FREE (S)   TROPONIN I HIGH SENSITIVITY   RAINBOW DRAW LAVENDER   RAINBOW DRAW LIGHT GREEN   RAINBOW DRAW GOLD   RAINBOW DRAW BLUE     EKG    Rate, intervals and axes as noted on EKG Report.  Rate: 92  Rhythm: Sinus Rhythm  Reading: No ST deviation, normal axis             Imaging Results Available and Reviewed while in ED: CHEST RADIOGRAPH(S)    COMPARISON: 7/29/2024      IMPRESSION:  Mild diffuse prominence of the interstitium, nonspecific and may be within normal limits for  this patient although may represent edema or atypical/viral pneumonia in the right clinical setting.  No focal consolidation.  No pneumothorax or pleural effusion.  Normal cardiomediastinal silhouette.  No acute osseous abnormality.  ED Medications Administered: Medications - No data to display      MDM     Vitals:    06/06/25 0432 06/06/25 0445 06/06/25 0500 06/06/25 0515   BP: 155/78 134/72 139/70 148/72   Pulse:  86 90 87   Resp:  22 16 17   Temp:       TempSrc:       SpO2:  99% 100% 99%   Weight:       Height:         *I personally reviewed and interpreted all ED vitals.  I also personally reviewed all labs and imaging if ordered    Pulse Ox: 98%, Room air, Normal     Monitor Interpretation:   normal sinus rhythm    Differential Diagnosis/ Diagnostic Considerations: Chest pain, chest wall pain, anxiety, muscle strain, GERD    Medical Record Review: I personally reviewed available prior medical records for any recent pertinent discharge summaries, testing, and procedures and reviewed those reports.    Complicating Factors: The patient already has does not have any pertinent problems on file. to contribute to the complexity of this ED evaluation.    Medical Decision Making  55-year-old female presents ER with complaint of chest pain started approximately 3 PM.  Patient states her pain significantly improved in the ER.  Patient's EKG including first set of troponin negative.  Will repeat troponin 2 hours later.  Patient signed out to oncoming physician for final disposition and lab interpretation.    HEART score for chest pain  History- (Highly suspicious 2pt, Mod 1pt, slightly 0pt)        0  ECG- (significant ST deviation 2pt, Non spec 1pt, nl 0pt)  0  AGE- (>65 2pt, 45-64 1 pt, Under 45 0 pt)    1  Risk Factors- ( DM,HTN,Chol, fhx CAD, BMI>30, hx CAD)  ( >3 or hx CAD 2pt, 1-2 risks 1pt, None 0pt)  2  Troponin- ( 3 times nl 2pt, 2 times nl 1pt, nl 0pt   0         TOTAL  3    SCORE 0-3: 2.5% MACE over next 6 weeks  consider D/C outpatient F/U  SCORE 4-6: 20.3% MACE over next 6 weeks consider admit  SCORE 7-10:72.7% MACE over next 6 weeks consider early invasive strategy.    Patient has a HEART score of 3, plan will be discharge.       Problems Addressed:  Chest pain of uncertain etiology: acute illness or injury    Amount and/or Complexity of Data Reviewed  Independent Historian: EMS     Details: History obtained from EMS states the patient was having chest pain as well as epigastric pain.  Patient's pain since resolved and improved since come to the ER  Labs: ordered. Decision-making details documented in ED Course.  Radiology: ordered and independent interpretation performed. Decision-making details documented in ED Course.        Condition upon leaving the department: Stable    Disposition and Plan     Clinical Impression:  1. Chest pain of uncertain etiology        Disposition:  There is no disposition on file for this visit.    Follow-up:  Claudio Redd, DO  [address] [facility] [address]  [facility] [address]  [phone]    Schedule an appointment as soon as possible for a visit  If symptoms worsen      Medications Prescribed:  Current Discharge Medication List                   [1] History reviewed. No pertinent past medical history.  [2] History reviewed. No pertinent surgical history.  [3] (Not in a hospital admission)   [4]   Family History  Problem Relation Age of Onset    Breast Cancer Mother 73   [5]   Social History  Socioeconomic History    Marital status:    Tobacco Use    Smoking status: Never    Smokeless tobacco: Never   Vaping Use    Vaping status: Never Used   Substance and Sexual Activity    Alcohol use: Never    Drug use: Never      use: Never

## 2025-06-06 NOTE — ED QUICK NOTES
Patient had x1 emesis - states vomiting resolved her nausea - denies further symptoms @ this time.

## 2025-06-06 NOTE — ED INITIAL ASSESSMENT (HPI)
Patient arrived to ED with main c.o. SOB and CP (middle of chest) - steady and dull at start - now sharp - patient denies dizziness and N/V/D. Patient breathing non-labored on RA + pt hemodynamically stable. Patient has hx of CHF - diagnosed last year - patient also has hx of afib - eliquis. Patient received 324 of PO aspirin and .4 nitro by paramedics en route to hospital - confirms slight relief of symptoms.

## 2025-06-06 NOTE — ED PROVIDER NOTES
Case was endorsed by Dr. Marcus.  His plan was for the patient to be discharged if her second troponin was negative.  He had evaluated all other laboratory studies.  Her free T3 was slightly elevated.  The second troponin was normal.  She said she feels fine right now.  A negative regadenoson stress test from about 10 months ago was reviewed.  Patient to follow-up and return for changes or worsening.

## 2025-06-06 NOTE — DISCHARGE INSTRUCTIONS
One of your thyroid test was just above the normal range so you should follow-up with either your primary doctor or the endocrinologist, Dr. Soto immediately.  Follow-up with your doctor and cardiology as soon as possible.  Return for changes or worsening.

## 2025-07-22 ENCOUNTER — LAB ENCOUNTER (OUTPATIENT)
Dept: LAB | Age: 55
End: 2025-07-22
Attending: INTERNAL MEDICINE
Payer: MEDICAID

## 2025-07-22 DIAGNOSIS — E05.90 HYPERTHYROIDISM: ICD-10-CM

## 2025-07-22 LAB — T4 FREE SERPL-MCNC: 1.4 NG/DL (ref 0.8–1.7)

## 2025-07-22 PROCEDURE — 84439 ASSAY OF FREE THYROXINE: CPT

## 2025-07-22 PROCEDURE — 36415 COLL VENOUS BLD VENIPUNCTURE: CPT

## 2025-08-05 ENCOUNTER — HOSPITAL ENCOUNTER (OUTPATIENT)
Dept: MAMMOGRAPHY | Age: 55
Discharge: HOME | End: 2025-08-05
Attending: INTERNAL MEDICINE

## 2025-08-05 ENCOUNTER — OFFICE VISIT (OUTPATIENT)
Facility: LOCATION | Age: 55
End: 2025-08-05

## 2025-08-05 VITALS
WEIGHT: 154 LBS | HEIGHT: 64 IN | BODY MASS INDEX: 26.29 KG/M2 | DIASTOLIC BLOOD PRESSURE: 76 MMHG | HEART RATE: 97 BPM | SYSTOLIC BLOOD PRESSURE: 102 MMHG

## 2025-08-05 DIAGNOSIS — I50.9 ACUTE HEART FAILURE, UNSPECIFIED HEART FAILURE TYPE (HCC): ICD-10-CM

## 2025-08-05 DIAGNOSIS — E05.00 GRAVES' ORBITOPATHY: ICD-10-CM

## 2025-08-05 DIAGNOSIS — E07.9 THYROID DISEASE: ICD-10-CM

## 2025-08-05 DIAGNOSIS — R92.8 FOLLOW-UP EXAMINATION OF ABNORMAL MAMMOGRAM: ICD-10-CM

## 2025-08-05 DIAGNOSIS — I42.9 CARDIOMYOPATHY, UNSPECIFIED TYPE (HCC): ICD-10-CM

## 2025-08-05 DIAGNOSIS — I48.91 ATRIAL FIBRILLATION WITH RAPID VENTRICULAR RESPONSE (HCC): ICD-10-CM

## 2025-08-05 DIAGNOSIS — E05.00 GRAVES DISEASE: Primary | ICD-10-CM

## 2025-08-05 DIAGNOSIS — E05.90 HYPERTHYROIDISM: ICD-10-CM

## 2025-08-05 PROCEDURE — 77061 BREAST TOMOSYNTHESIS UNI: CPT | Performed by: INTERNAL MEDICINE

## 2025-08-05 PROCEDURE — 77065 DX MAMMO INCL CAD UNI: CPT | Performed by: INTERNAL MEDICINE

## 2025-08-05 PROCEDURE — 99214 OFFICE O/P EST MOD 30 MIN: CPT | Performed by: INTERNAL MEDICINE

## 2025-08-18 RX ORDER — FUROSEMIDE 40 MG/1
40 TABLET ORAL DAILY
Qty: 90 TABLET | Refills: 1 | Status: SHIPPED | OUTPATIENT
Start: 2025-08-18 | End: 2025-08-18

## 2025-08-18 RX ORDER — PROPRANOLOL HYDROCHLORIDE 10 MG/1
5 TABLET ORAL 2 TIMES DAILY
Qty: 90 TABLET | Refills: 1 | Status: SHIPPED | OUTPATIENT
Start: 2025-08-18

## (undated) NOTE — LETTER
[facility] 3W/SW  [address] [facility] [address]  [phone]    Blood Transfusion Consent    In the course of your treatment, it may become necessary to administer a transfusion of blood or blood components. This form provides basic information concerning this procedure and, if signed by you, authorizes its administration. By signing this form, you agree that all of your questions about the administration of blood or blood products have been answered by the ordering medical professional or designee.    Description of Procedure  Blood is introduced into one of your veins, commonly in the arm, using a sterilized disposable needle. The amount of blood transfused, and whether the transfusion will be of blood or blood components is a judgement the physician will make based on your particular needs.    Risks  The transfusion is a common procedure of low risk.  MINOR AND TEMPORARY REACTIONS ARE NOT UNCOMMON, including a slight bruise, swelling or local reaction in the area where the needle pierces your skin, or a nonserious reaction to the transfused material itself, including headache, fever or mild skin reaction, such as rash.  Serious reactions are possible, though very unlikely, and include severe allergic reaction (shock) and destruction (hemolysis) of transfused blood cells.  Infectious diseases which are known to be transmitted by blood transfusion include certain types of viral Hepatitis(liver infection from a virus), Human Immunodeficiency Virus (HIV-1,2) infection, a viral infection known to cause Acquired Immunodeficiency Syndrome (AIDS), as well as certain other bacterial, viral, and parasitic diseases. While a minimal risk of acquiring an infectious disease from transfused blood exists, in accordance with the Federal and State law, all due care has been taken in donor selection and testing to avoid transmission of disease.    Alternatives  If loss of blood poses serious threats during your  treatment, THERE IS NO EFFECTIVE ALTERNATIVE TO BLOOD TRANSFUSION. However, if you have any further questions on this matter, your provider will fully explain the alternatives to you if it has not already been done.    I, ______________________________, have read/had read to me the above. I understand the matters bearing on the decision whether or not to authorize a transfusion of blood or blood components. I have no questions which have not been answered to my full satisfaction. I hereby consent to such transfusion as my physician may deem necessary or advisable in the course of my treatment.    ______________________________________________                    ___________________________  (Signature of Patient or Responsible party in case of minor,                 (Printed Name of Patient or incompetent, or unconscious patient)              Responsible Party)    ___________________________               _____________________  (Relationship to Patient if not self)                                    (Date and Time)    __________________________                                                           ______________________              (Signature of Witness)               (Printed Name of Witness)     Language line ()    Telephone/Verbal/Video Consent    __________________________                     ____________________  (Signature of 2nd Witness           (Printed Name of 2nd  Telephone/Verbal/Video Consent)           Witness)    Patient Name: Shubham Zurita     : 1970                 Printed: 2024     Medical Record #: [id number]      Rev: 2023

## (undated) NOTE — LETTER
No information on file.    Consent for Diagnostic Services         Date of Procedure: 8/13/2024    The physician has ordered a * cardiac nuclear stress test lexiscan * to determine heart function. The information obtained will aid in detecting the possible presence of heart disease, to determine why related symptoms may be occurring, and to determine an appropriate plan of medical management.    The risks associated with any exercise test or pharmacological stress test are similar to the risks associated with exercise, including an abnormal blood pressure, dizziness, fainting, abnormal heart rate and in very rare instances, heart attack, stroke, or death. During the test, every effort will be made to minimize such risks by careful observation, however any unusual feeling or symptoms experienced should be reported. Emergency equipment and trained personnel are available to assist with unusual situations, which may arise, and these personnel have permission to perform treatment.    During the treadmill or nuclear stress test, the exercise intensity begins at a low level and advances in stages depending on fitness level. The test may stop at any time because of signs of fatigue, changes in heart rate, electrocardiogram, or blood pressure, or any other symptoms experienced.     If a pharmacological stress test has been ordered, symptoms may include: headache, shortness of breath, flushing, warmth, rapid heart rate, or a bitter taste in the mouth. Sometimes symptoms may not be experienced. Prompt reporting of any symptoms is very important. This could include either regadenoson or dobutamine.    During a regadenoson stress test, an injection of regadenoson is administered. Immediately after the regadenoson is given, there is an injection of a radioactive isotope. During a dobutamine stress test, dobutamine infuses over approximately fifteen minutes. A radioactive isotope is injected during the infusion. After either  pharmacological stress test, a nuclear scan or an echocardiogram will be performed.    The information obtained during testing will be treated as privileged and confidential. The information obtained will be given to my doctor, may be used for insurance purposes or to track and trend data as part of  with privacy retained.    I have read and understand the above information. I voluntarily agree and consent to the above ordered test. Furthermore, no guarantees or assurances have been given by anyone as to the results that may be obtained from this procedure. Any questions that may have occurred to me have been answered to my satisfaction.      [  ]  Patient denies pregnancy or not applicable     [  ]  Patient denies breastfeeding    Signature of Patient:   _______________________________________________________________  Responsible person in case of minor, unconscious: ________________________________________  Relationship to patient: ______________________________________________________________    Signature of Witness: _________________________________Date: ___________Time: __________      Patient Name: Shubham Zurita : 1970  Printed: 2024   Medical Record #: [id number]